# Patient Record
Sex: FEMALE | Race: WHITE | Employment: OTHER | URBAN - METROPOLITAN AREA
[De-identification: names, ages, dates, MRNs, and addresses within clinical notes are randomized per-mention and may not be internally consistent; named-entity substitution may affect disease eponyms.]

---

## 2017-01-12 ENCOUNTER — OFFICE VISIT (OUTPATIENT)
Dept: INTERNAL MEDICINE CLINIC | Age: 62
End: 2017-01-12

## 2017-01-12 VITALS
OXYGEN SATURATION: 95 % | HEIGHT: 62 IN | WEIGHT: 233 LBS | HEART RATE: 74 BPM | DIASTOLIC BLOOD PRESSURE: 72 MMHG | BODY MASS INDEX: 42.88 KG/M2 | SYSTOLIC BLOOD PRESSURE: 118 MMHG | TEMPERATURE: 98.2 F

## 2017-01-12 DIAGNOSIS — R19.8 FULLNESS OF ABDOMEN: Primary | ICD-10-CM

## 2017-01-12 DIAGNOSIS — R14.0 FLATULENCE/GAS PAIN/BELCHING: ICD-10-CM

## 2017-01-12 NOTE — PROGRESS NOTES
HPI/History  Lorrie Robertson is a 62 y.o.  female who presents for evaluation. Pt c/o an uncomfortable sensation of epigastric fullness/gas with excess gas/belching. Symptoms started during a 20 day round of abx with course of steroids for other issues. She finished the abx about 10 days ago but the sensation and belching have persisted but gradually improving. Sensation present at low level fairly consistently but worsened after meals. She feels much relief after belching. She does not describe it as pain and denies any other abdominal pain, fevers, abnormal frequency or characteristics of bowel movements. No changes in diet. Reflux has been well controlled with nexium but has tried taking bid with no known benefit to above. Also used mylanta with questionable benefit. Has not tried gasX, bean-o, or similar. Uses cpap but does not sound to be the cause (air gulping/similar). Rarely takes naproxen. Hx of impaired glucose but A1c 5.5 in November. Denies any cardiopulmonary sxs including palpitations/HR abnormalities. No other sxs or complaints.     Patient Active Problem List   Diagnosis Code    Obesity E66.9    Dyslipidemia E78.5    Bronchiectasis Dr Tilden Carrel J47.9    Sleep apnea, obstructive Dr. Juanjose Rockwell G47.33    Asthma with COPD ([address] J44.9, J45.909    Essential hypertension I10    Depression F32.9    Gastroesophageal reflux disease without esophagitis K21.9    IGT (impaired glucose tolerance) R73.02     Past Medical History   Diagnosis Date    Allergic rhinitis      Dr. Joyce Mckeon on shots    Asthma      FEV1/FVC 64%, preFEV1 51%, postFEV1 57%, %, DLCO 96% (2/15 - Dr Tilden Carrel)    [facility] Bronchiectasis [facility])      since childhood, idiopathic; CF negx2, A1AT neg, IgE nl, aspergillus nAb neg, sputum AFB neg, PPD neg    Chronic obstructive pulmonary disease ([facility]     Colon adenoma 3/12     Dr. Waldemar Sanchez Depression      PHQ-9 was 11/27, DANNY-7 was 5/21 from 4/15; did well on lexapro    Dyslipidemia      calculated 10 year risk score was 2.5% (4/15), 3.9% (10/15); 5% (11/16)    GERD (gastroesophageal reflux disease)     Hypertension     IGT (impaired glucose tolerance)     Insomnia      no response to trazodone or ambien    Morbid obesity with BMI of 40.0-44.9, adult ([facility])      peak weight 231 lbs, bmi 42.2 from 7/13; qsymia trial 4/14; declined med supervised wt loss, bariatrics w Dr Rc Sullivan    Pseudomonas respiratory infection      2015 Dr Jessica Spangler    Sleep apnea, obstructive 10/12     Dr Brook Crowell; AHI 15, RDI 15, lowest sat 84%, CPAP 15CWP; now seeing Dr Maribel Neville     Past Surgical History   Procedure Laterality Date    Bronchoscopy  1987    Egd  05/94    Hx heent       Sinus Surgery X2    Hx tonsillectomy      Hx knee arthroscopy Left 6/14     meniscus surgery Dr Naila Jose Pr cardiac surg procedure unlist  2/15     echo nl lv, ef 55%, nl wma, mild mr [facility])    Pr cardiac surg procedure unlist  2/15     holter negative    Hx colonoscopy       Dr. Rony Mccain 3/12 adenoma    Pr thoracoscopy w/lobectomy single lobe  07/06     s/p JOSE Lobectomy Dr. Hermes Murray 2006     Social History     Social History    Marital status:      Spouse name: N/A    Number of children: N/A    Years of education: N/A     Occupational History     W [employer]     Social History Main Topics    Smoking status: Never Smoker    Smokeless tobacco: Never Used    Alcohol use No    Drug use: No    Sexual activity: Not on file     Other Topics Concern    Not on file     Social History Narrative     Family History   Problem Relation Age of Onset    Hypertension Mother     Elevated Lipids Mother     Cancer Father     Colon Polyps Father     Arthritis-osteo Father     Crohn's Disease Sister     Migraines Brother      Current Outpatient Prescriptions   Medication Sig    roflumilast (DALIRESP) tab tablet Take 500 mcg by mouth daily.     diazePAM (VALIUM) 10 mg tablet Take 1 Tab by mouth every six (6) hours as needed for Anxiety.  amLODIPine (NORVASC) 10 mg tablet TAKE 1 TABLET BY MOUTH EVERY DAY    fluticasone (FLONASE) 50 mcg/actuation nasal spray 2 Sprays by Both Nostrils route daily.  esomeprazole (NEXIUM) 40 mg capsule TAKE 1 CAPSULE BY MOUTH EVERY DAY    budesonide-formoterol (SYMBICORT) 160-4.5 mcg/actuation HFA inhaler Take 2 Puffs by inhalation two (2) times a day.  albuterol (PROVENTIL VENTOLIN) 2.5 mg /3 mL (0.083 %) nebulizer solution 2.5 mg.    albuterol (PROVENTIL HFA, VENTOLIN HFA, PROAIR HFA) 90 mcg/actuation inhaler 2 Puffs.  triamterene-hydrochlorothiazide (DYAZIDE) 37.5-25 mg per capsule TAKE 1 CAPSULE BY MOUTH EVERY DAY AS NEEDED FOR SWELLING    tiotropium (SPIRIVA WITH HANDIHALER) 18 mcg inhalation capsule Take 1 Cap by inhalation daily.  naproxen sodium (NAPROSYN) 220 mg tablet     albuterol sulfate 2.5 mg/0.5 mL Nebu 2.5 mg, ipratropium 0.02 % Soln 0.5 mg 1 Dose by Nebulization route.  cpap machine kit by Does Not Apply route. CPAP at setting of 15 CWP with ramp of 20 minutes with humidifier. Mask: Mirage Quattro FX small size. Length of need 99 months. Replace mask and accessories as needed times 12 months. Download data at 45 days and fax at [phone]. No current facility-administered medications for this visit. Allergies   Allergen Reactions    Accolate [Zafirlukast] Other (comments)     Headache?  Daliresp [Roflumilast] Other (comments)     insomnia    Lisinopril Cough    Pollen Extracts Cough     Other reaction(s): wheezing/sob    Soma [Carisoprodol] Other (comments)     headache       Review of Systems  Rest of ROS negative. Significant findings included in HPI. Physical Examination  Visit Vitals    /72    Pulse 74    Temp 98.2 °F (36.8 °C) (Oral)    Ht 5' 2\" (1.575 m)    Wt 233 lb (105.7 kg)    SpO2 95%    BMI 42.62 kg/m2       General - Alert and in no acute distress.  Pt appears well, comfortable, and in good spirits. Nontoxic. Not anxious, non-diaphoretic. Mental status - Appropriate mood, behavior, speech content, dress, and thought processes. Pulm - Complete sentences. No tachypnea, retractions, or cyanosis. Good respiratory effort. Clear to auscultation bilat. No wheezes, rales, or rhonchi noted today. Cardiovascular - Normal rate, regular rhythm. No murmurs or gallops noted. Abdomen - Obese. Normoactive bowel sounds. Soft, nontender. No guarding, rigidity, or rebound. Negative Camp and McBurney. No appreciable masses. No other findings. Assessment and Plan  1. Epigastric fullness/gaseous sensation with excess belching - Started during a round of abx and appears to be gradually improving. Relatively benign exam with encouraging vitals and no apparent ominous developments. She will avoid exacerbating factors such as carbonated drinks, gum, or similar and incorporate small bland meals. She can continue nexium and will try gasX or bean-o or similar. Will continue to monitor for now with further planning/eval pending her progression. She will return/call if not continuing to improve, worsening, or any developments as discussed. Pt happily agrees with plan. PLEASE NOTE:   This document has been produced using voice recognition software. Unrecognized errors in transcription may be present.     Virginia Moore [facility] [address] [place] [address]  [phone]  1/12/2017

## 2017-01-12 NOTE — MR AVS SNAPSHOT
Visit Information Date & Time Provider Department Dept. Phone Encounter #  
 1/12/2017  9:00 AM Jake Dawnma Internist of [address] [id number] Upcoming Health Maintenance Date Due DTaP/Tdap/Td series (1 - Tdap) 10/23/1977 PAP AKA CERVICAL CYTOLOGY 10/23/2015 ZOSTER VACCINE AGE 60> 10/23/2016 COLONOSCOPY 2/24/2017 BREAST CANCER SCRN MAMMOGRAM 12/14/2018 Allergies as of 1/12/2017  Review Complete On: 1/12/2017 By: Lillie Cabral LPN Severity Noted Reaction Type Reactions Accolate [Zafirlukast]    Other (comments) Headache? Daliresp [Roflumilast]  10/23/2015    Other (comments)  
 insomnia Lisinopril    Cough Pollen Extracts  02/02/2015    Cough Other reaction(s): wheezing/sob Soma [Carisoprodol]  06/25/2012    Other (comments)  
 headache Current Immunizations  Reviewed on 10/30/2014 Name Date Influenza Vaccine 10/1/2016, 10/1/2015, 10/1/2014, 10/1/2013, 10/1/2012 Pneumococcal Polysaccharide (PPSV-23) 10/30/2014 Pneumococcal Vaccine (Unspecified Type) 12/15/2006 Not reviewed this visit Vitals BP Pulse Temp Height(growth percentile) Weight(growth percentile) SpO2  
 118/72 74 98.2 °F (36.8 °C) (Oral) 5' 2\" (1.575 m) 233 lb (105.7 kg) 95% BMI OB Status Smoking Status 42.62 kg/m2 Postmenopausal Never Smoker Vitals History BMI and BSA Data Body Mass Index Body Surface Area  
 42.62 kg/m 2 2.15 m 2 Preferred Pharmacy Pharmacy Name Phone [address] [phone] Your Updated Medication List  
  
   
This list is accurate as of: 1/12/17  9:05 AM.  Always use your most recent med list.  
  
  
  
  
 * albuterol 2.5 mg /3 mL (0.083 %) nebulizer solution Commonly known as:  PROVENTIL VENTOLIN  
2.5 mg.  
  
 * albuterol 90 mcg/actuation inhaler Commonly known as:  PROVENTIL HFA, VENTOLIN HFA, PROAIR HFA  
2 Puffs. albuterol sulfate 2.5 mg/0.5 mL Nebu 2.5 mg, ipratropium 0.02 % Soln 0.5 mg  
1 Dose by Nebulization route. amLODIPine 10 mg tablet Commonly known as:  Lorena Sukhwinder TAKE 1 TABLET BY MOUTH EVERY DAY  
  
 budesonide-formoterol 160-4.5 mcg/actuation HFA inhaler Commonly known as:  SYMBICORT Take 2 Puffs by inhalation two (2) times a day. chlorpheniramine-HYDROcodone 10-8 mg/5 mL suspension Commonly known as:  Trey Quezada Take 5 mL by mouth every twelve (12) hours as needed for Cough. Max Daily Amount: 10 mL.  
  
 cpap machine kit  
by Does Not Apply route. CPAP at setting of 15 CWP with ramp of 20 minutes with humidifier. Mask: Mirage Quattro FX small size. Length of need 99 months. Replace mask and accessories as needed times 12 months. Download data at 45 days and fax at [phone]. DALIRESP Tab tablet Generic drug:  roflumilast  
Take 500 mcg by mouth daily. diazePAM 10 mg tablet Commonly known as:  VALIUM Take 1 Tab by mouth every six (6) hours as needed for Anxiety. esomeprazole 40 mg capsule Commonly known as:  NexIUM  
TAKE 1 CAPSULE BY MOUTH EVERY DAY  
  
 fluticasone 50 mcg/actuation nasal spray Commonly known as:  Michaelyn Drought 2 Sprays by Both Nostrils route daily. naproxen sodium 220 mg tablet Commonly known as:  NAPROSYN  
  
 SPIRIVA WITH HANDIHALER 18 mcg inhalation capsule Generic drug:  tiotropium Take 1 Cap by inhalation daily. triamterene-hydroCHLOROthiazide 37.5-25 mg per capsule Commonly known as:  [place] Florencio TAKE 1 CAPSULE BY MOUTH EVERY DAY AS NEEDED FOR SWELLING  
  
 * Notice: This list has 2 medication(s) that are the same as other medications prescribed for you. Read the directions carefully, and ask your doctor or other care provider to review them with you. Introducing [facility]! Dear Dave Mayers: Thank you for requesting a [employer] account. Our records indicate that you already have an active [employer] account. You can access your account anytime at [email or web address] Did you know that you can access your hospital and ER discharge instructions at any time in [employer]? You can also review all of your test results from your hospital stay or ER visit. Additional Information If you have questions, please visit the Frequently Asked Questions section of the [employer] website at [email or web address] Remember, [employer] is NOT to be used for urgent needs. For medical emergencies, dial 911. Now available from your iPhone and Android! Please provide this summary of care documentation to your next provider. Your primary care clinician is listed as Jean Gómez. If you have any questions after today's visit, please call [phone].

## 2017-03-25 NOTE — PROGRESS NOTES
62 y.o. WHITE OR  female who presents for f/u    No cardiac complaints and her bp has been better when she checks    She continues to see Dr Valerie Brady and remains on regimen below, she had a lung biopsy when she had the sinus surgery but results are pending and she has f/u in hear future    Denies polyuria, polydipsia, nocturia, vision change. Not checking sugars at this time Not interested in tarik supp and medically supervised wt loss, gastric sleeve through Dr Aníbal Brizuela not covered.   Admits to overeating and stress eating in particular with her mom's dementia and her own illnesses, she loves chocolate    Vitals 3/29/2017 1/12/2017 11/29/2016 6/29/2016 2/12/2016   Weight 226 lb 233 lb 234 lb 232 lb 230 lb     She is interested in trying the restoril again as the trazodone and valium are not helping her sleep the whole night    No gi or gu complaints    Past Medical History:   Diagnosis Date    Allergic rhinitis     Dr. Jose Hameed on shots    Asthma     FEV1/FVC 64%, preFEV1 51%, postFEV1 57%, %, DLCO 96% (2/15 - Dr Valerie Brady)    [facility] Bronchiectasis [facility])     since childhood, idiopathic; CF negx2, A1AT neg, IgE nl, aspergillus nAb neg, sputum AFB neg, PPD neg    Chronic obstructive pulmonary disease ([facility]     Colon adenoma 3/12    Dr. Chinyere Larsen Depression     PHQ-9 was 11/27, DANNY-7 was 5/21 from 4/15; did well on lexapro    Dyslipidemia     calculated 10 year risk score was 2.5% (4/15), 3.9% (10/15); 5% (11/16)    GERD (gastroesophageal reflux disease)     Hypertension     IGT (impaired glucose tolerance)     Insomnia     no response to trazodone or ambien    Morbid obesity with BMI of 40.0-44.9, adult ([facility])     peak weight 231 lbs, bmi 42.2 from 7/13; qsymia trial 4/14; declined med supervised wt loss, bariatrics w Dr Aníbal Brizuela    Pseudomonas respiratory infection     2015 Dr Esther Berumen    Sleep apnea, obstructive 10/12    Dr Rickey Butt; AHI 15, RDI 15, lowest sat 84%, CPAP 15CWP; now seeing Dr Dmitry Rouse Past Surgical History:   Procedure Laterality Date    BRONCHOSCOPY  26    CARDIAC SURG PROCEDURE UNLIST  2/15    echo nl lv, ef 55%, nl wma, mild mr [facility])    CARDIAC SURG PROCEDURE UNLIST  2/15    holter negative    CHEST SURGERY PROCEDURE UNLISTED  03/2017    lung biopsy [facility]    EGD  05/94    HX COLONOSCOPY      Dr. Diaz Rothman 3/12 adenoma    HX HEENT      Sinus Surgery X2; Dr Clara Blake 3/17    HX KNEE ARTHROSCOPY Left 6/14    meniscus surgery Dr Yolanda Randall  07/06    s/p JOSE Lobectomy Dr. Taylor Nevarez 2006     Social History     Social History    Marital status:      Spouse name: N/A    Number of children: N/A    Years of education: N/A     Occupational History    [employer]     Social History Main Topics    Smoking status: Never Smoker    Smokeless tobacco: Never Used    Alcohol use No    Drug use: No    Sexual activity: Not on file     Other Topics Concern    Not on file     Social History Narrative     Current Outpatient Prescriptions   Medication Sig    temazepam (RESTORIL) 30 mg capsule Take 1 Cap by mouth nightly as needed for Sleep. Max Daily Amount: 30 mg.    roflumilast (DALIRESP) tab tablet Take 500 mcg by mouth daily.  diazePAM (VALIUM) 10 mg tablet Take 1 Tab by mouth every six (6) hours as needed for Anxiety.  amLODIPine (NORVASC) 10 mg tablet TAKE 1 TABLET BY MOUTH EVERY DAY    fluticasone (FLONASE) 50 mcg/actuation nasal spray 2 Sprays by Both Nostrils route daily.  esomeprazole (NEXIUM) 40 mg capsule TAKE 1 CAPSULE BY MOUTH EVERY DAY    budesonide-formoterol (SYMBICORT) 160-4.5 mcg/actuation HFA inhaler Take 2 Puffs by inhalation two (2) times a day.  albuterol (PROVENTIL VENTOLIN) 2.5 mg /3 mL (0.083 %) nebulizer solution 2.5 mg.    albuterol (PROVENTIL HFA, VENTOLIN HFA, PROAIR HFA) 90 mcg/actuation inhaler 2 Puffs.     triamterene-hydrochlorothiazide (DYAZIDE) 37.5-25 mg per capsule TAKE 1 CAPSULE BY MOUTH EVERY DAY AS NEEDED FOR SWELLING    tiotropium (SPIRIVA WITH HANDIHALER) 18 mcg inhalation capsule Take 1 Cap by inhalation daily.  naproxen sodium (NAPROSYN) 220 mg tablet     albuterol sulfate 2.5 mg/0.5 mL Nebu 2.5 mg, ipratropium 0.02 % Soln 0.5 mg 1 Dose by Nebulization route.  cpap machine kit by Does Not Apply route. CPAP at setting of 15 CWP with ramp of 20 minutes with humidifier. Mask: Mirage Quattro FX small size. Length of need 99 months. Replace mask and accessories as needed times 12 months. Download data at 45 days and fax at [phone]. No current facility-administered medications for this visit. Allergies   Allergen Reactions    Accolate [Zafirlukast] Other (comments)     Headache?  Daliresp [Roflumilast] Other (comments)     insomnia    Lisinopril Cough    Pollen Extracts Cough     Other reaction(s): wheezing/sob    Soma [Carisoprodol] Other (comments)     headache     REVIEW OF SYSTEMS: gyn Ches 2013, mammo 2016, colo 3/12 Dr Quintero [place]  Ophtho - no vision change or eye pain  Oral - no mouth pain, tongue or tooth problems  Ears - no hearing loss, ear pain, fullness, no swallowing problems  Cardiac - no CP, PND, orthopnea, palpitations or syncope  Chest - no breast masses  GI - no heartburn, nausea, vomiting, change in bowel habits, bleeding  Urinary - no dysuria, hematuria, flank pain, urgency, frequency  Derm - no nail abnormalities, rashes, lesions of note, hair loss  Psych - denies any anxiety or depression symptoms, no hallucinations or violent ideation  Constitutional - no wt loss, night sweats, unexplained fevers    Visit Vitals    /72    Pulse 76    Temp 98.5 °F (36.9 °C) (Oral)    Ht 5' 2\" (1.575 m)    Wt 226 lb (102.5 kg)    SpO2 93%    BMI 41.34 kg/m2     A&O x3  Affect is appropriate. Mood stable  No apparent distress  Anicteric, no JVD, adenopathy or thyromegaly.    No carotid bruits or radiated murmur  Lungs clear to auscultation although diminished breast sounds, no wheezes or rales  Heart showed regular rate and rhythm. No murmur, rubs, gallops  Abdomen soft nontender, no hepatosplenomegaly or masses. Extremities with 1+ edema edema. Pulses 1-2+ symmetrically    LABS  From 6/10 showed   gluc 83, cr 0.80, gfr>60,  alt 33,        chol 206, tg 157, hdl 49, ldl-c 126, wbc 8.2, hb 14.9, plt 275, tsh 0.77, vit d 31, b12 690  From 7/13 showed   gluc 99, cr 0.60, gfr>60,  alt 30,         chol 207, tg 159, hdl 51, ldl-c 124, wbc 7.4, hb 14.3, plt 239, ua neg,   From 3/14 showed                  hba1c 5.7, chol 202, tg 154, hdl 50, ldl-c 121, wbc 9.4, hb 14.1, plt 246  From 3/15 showed   gluc 93, cr 0.55, gfr>60,  alt<5,         wbc 8.6, hb 15.1, plt 277, tsh 1.00  From 4/15 showed   gluc 97, cr 0.77, gfr>60,   hba1c 5.7, chol 205, tg 162, hdl 48, ldl-c 125, wbc 7.1, hb 14.2, plt 244  From 10/15 showed gluc 97, cr 0.70, gfr>60,   hba1c 5.7, chol 226, tg 223, hdl 46, ldl-c 135  From 11/16 showed gluc 97, cr 0.54, gfr 103, alt 16, hba1c 5.5, chol 190, tg 130, hdl 49, ldl-c 115, wbc 6.4, hb 14.7, plt 246, hep c neg    Results for orders placed or performed during the hospital encounter of 12/20/16   IMMUNOGLOBULINS, G/A/M, QT.    Result Value Ref Range    IMMUNOGLOBULIN G 1106 694 - 1618 mg/dL    IgA TOTAL 186 81 - 463 mg/dL    IMMUNOGLOBULIN M 157 48 - 271 mg/dL   IGG SUBCLASSES   Result Value Ref Range    IgG Subclass 1 574 382 - 929 mg/dL    IgG Subclass 2 503 241 - 700 mg/dL    IgG Subclass 3 62 22 - 178 mg/dL    IgG Subclass 4 38.9 4.0 - 86.0 mg/dL     Calculated 10 year risk score was 5.0%    Patient Active Problem List   Diagnosis Code    Dyslipidemia E78.5    Bronchiectasis Dr Valerie Brady J47.9    Sleep apnea, obstructive Dr. Dmitry Rouse G47.33    Asthma with COPD ([facility] J44.9, J45.909    Primary hypertension I10    Depression F32.9    Gastroesophageal reflux disease without esophagitis K21.9    IGT (impaired glucose tolerance) R73.02    Chronic insomnia F51.04    Morbid obesity with BMI of 40.0-44.9, adult ([facility]) E66.01, Z68.41     Assessment and plan:  1. GERD. PPI and avoidance measures  2. HTN. Continue current  3. Obesity. Declined tarik suppressants, med supervised wt loss, bariatrics  4. Dyslipidemia. Lifestyle and dietary measures  5. Sinus. F/U Dr Mike Garcia  6. JESUS. F/U Dr Savannah Lebron  7. Asthma and COPD, bronchiectasis. Continue current, f/u Dr Andi Sargent  8. Depression. Off meds  9. She will get zosta outpt, tdap today  10.,  Insomnia. Trial restoril        RTC 11/17    Above conditions discussed at length and patient vocalized understanding.   All questions answered to patient satifaction

## 2017-03-29 ENCOUNTER — OFFICE VISIT (OUTPATIENT)
Dept: INTERNAL MEDICINE CLINIC | Age: 62
End: 2017-03-29

## 2017-03-29 VITALS
DIASTOLIC BLOOD PRESSURE: 72 MMHG | HEART RATE: 76 BPM | BODY MASS INDEX: 41.59 KG/M2 | WEIGHT: 226 LBS | OXYGEN SATURATION: 93 % | HEIGHT: 62 IN | TEMPERATURE: 98.5 F | SYSTOLIC BLOOD PRESSURE: 116 MMHG

## 2017-03-29 DIAGNOSIS — F51.04 CHRONIC INSOMNIA: Primary | ICD-10-CM

## 2017-03-29 DIAGNOSIS — J44.9 ASTHMA WITH COPD (HCC): ICD-10-CM

## 2017-03-29 DIAGNOSIS — J47.9 BRONCHIECTASIS WITHOUT COMPLICATION (HCC): ICD-10-CM

## 2017-03-29 DIAGNOSIS — R73.02 IGT (IMPAIRED GLUCOSE TOLERANCE): ICD-10-CM

## 2017-03-29 DIAGNOSIS — I10 ESSENTIAL HYPERTENSION: ICD-10-CM

## 2017-03-29 DIAGNOSIS — Z23 ENCOUNTER FOR IMMUNIZATION: ICD-10-CM

## 2017-03-29 DIAGNOSIS — G47.33 SLEEP APNEA, OBSTRUCTIVE: ICD-10-CM

## 2017-03-29 DIAGNOSIS — E78.5 DYSLIPIDEMIA: ICD-10-CM

## 2017-03-29 DIAGNOSIS — E66.01 MORBID OBESITY DUE TO EXCESS CALORIES (HCC): ICD-10-CM

## 2017-03-29 RX ORDER — TEMAZEPAM 30 MG/1
30 CAPSULE ORAL
Qty: 30 CAP | Refills: 3 | OUTPATIENT
Start: 2017-03-29 | End: 2017-10-09 | Stop reason: SDUPTHER

## 2017-03-29 NOTE — PROGRESS NOTES
1. Have you been to the ER, urgent care clinic or hospitalized since your last visit? YES. [facility] March 3, 2017 for Endoscopic sinus surgery. 2. Have you seen or consulted any other health care providers outside of the [address] [place] [address] since your last visit (Include any pap smears or colon screening)? YES  , Dr. Evelio Yoder for follow up of sinus surgery. Do you have an Advanced Directive? YES    Would you like information on Advanced Directives? NO    Pt states she will get her Zoster from [facility].

## 2017-03-29 NOTE — MR AVS SNAPSHOT
Visit Information Date & Time Provider Department Dept. Phone Encounter #  
 3/29/2017  8:15 AM Bud Victoria MD Internist of [address] [place] [address] Road [phone] [id number] Upcoming Health Maintenance Date Due DTaP/Tdap/Td series (1 - Tdap) 10/23/1977 PAP AKA CERVICAL CYTOLOGY 10/23/2015 ZOSTER VACCINE AGE 60> 10/23/2016 COLONOSCOPY 2/24/2017 BREAST CANCER SCRN MAMMOGRAM 12/14/2018 Allergies as of 3/29/2017  Review Complete On: 3/29/2017 By: Denis Ascencio Severity Noted Reaction Type Reactions Accolate [Zafirlukast]    Other (comments) Headache? Daliresp [Roflumilast]  10/23/2015    Other (comments)  
 insomnia Lisinopril    Cough Pollen Extracts  02/02/2015    Cough Other reaction(s): wheezing/sob Soma [Carisoprodol]  06/25/2012    Other (comments)  
 headache Current Immunizations  Reviewed on 10/30/2014 Name Date Influenza Vaccine 10/1/2016, 10/1/2015, 10/1/2014, 10/1/2013, 10/1/2012 Pneumococcal Polysaccharide (PPSV-23) 10/30/2014 Pneumococcal Vaccine (Unspecified Type) 12/15/2006 Tdap 3/29/2017 Not reviewed this visit You Were Diagnosed With   
  
 Codes Comments Encounter for immunization    -  Primary ICD-10-CM: V15 ICD-9-CM: V03.89 Vitals BP Pulse Temp Height(growth percentile) Weight(growth percentile) SpO2  
 116/72 76 98.5 °F (36.9 °C) (Oral) 5' 2\" (1.575 m) 226 lb (102.5 kg) 93% BMI OB Status Smoking Status 41.34 kg/m2 Postmenopausal Never Smoker Vitals History BMI and BSA Data Body Mass Index Body Surface Area  
 41.34 kg/m 2 2.12 m 2 Preferred Pharmacy Pharmacy Name Phone [address] [place] [phone] Your Updated Medication List  
  
   
This list is accurate as of: 3/29/17  8:57 AM.  Always use your most recent med list.  
  
  
  
  
 * albuterol 2.5 mg /3 mL (0.083 %) nebulizer solution Commonly known as:  PROVENTIL VENTOLIN  
2.5 mg.  
  
 * albuterol 90 mcg/actuation inhaler Commonly known as:  PROVENTIL HFA, VENTOLIN HFA, PROAIR HFA  
2 Puffs. albuterol sulfate 2.5 mg/0.5 mL Nebu 2.5 mg, ipratropium 0.02 % Soln 0.5 mg  
1 Dose by Nebulization route. amLODIPine 10 mg tablet Commonly known as:  Luz Elena Darting TAKE 1 TABLET BY MOUTH EVERY DAY  
  
 budesonide-formoterol 160-4.5 mcg/actuation HFA inhaler Commonly known as:  SYMBICORT Take 2 Puffs by inhalation two (2) times a day. cpap machine kit  
by Does Not Apply route. CPAP at setting of 15 CWP with ramp of 20 minutes with humidifier. Mask: Mirage Quattro FX small size. Length of need 99 months. Replace mask and accessories as needed times 12 months. Download data at 45 days and fax at [phone]. DALIRESP Tab tablet Generic drug:  roflumilast  
Take 500 mcg by mouth daily. diazePAM 10 mg tablet Commonly known as:  VALIUM Take 1 Tab by mouth every six (6) hours as needed for Anxiety. esomeprazole 40 mg capsule Commonly known as:  NexIUM  
TAKE 1 CAPSULE BY MOUTH EVERY DAY  
  
 fluticasone 50 mcg/actuation nasal spray Commonly known as:  Lennice Boss 2 Sprays by Both Nostrils route daily. naproxen sodium 220 mg tablet Commonly known as:  NAPROSYN  
  
 SPIRIVA WITH HANDIHALER 18 mcg inhalation capsule Generic drug:  tiotropium Take 1 Cap by inhalation daily. triamterene-hydroCHLOROthiazide 37.5-25 mg per capsule Commonly known as:  Marcina Majors TAKE 1 CAPSULE BY MOUTH EVERY DAY AS NEEDED FOR SWELLING  
  
 * Notice: This list has 2 medication(s) that are the same as other medications prescribed for you. Read the directions carefully, and ask your doctor or other care provider to review them with you. We Performed the Following  TETANUS, DIPHTHERIA TOXOIDS AND ACELLULAR PERTUSSIS VACCINE (TDAP), IN INDIVIDS. >=7,  [id number] CPT(R)] Introducing [facility]! Dear Karen Neri: 
Thank you for requesting a Diurnal account. Our records indicate that you already have an active Diurnal account. You can access your account anytime at [email or web address] Did you know that you can access your hospital and ER discharge instructions at any time in Diurnal? You can also review all of your test results from your hospital stay or ER visit. Additional Information If you have questions, please visit the Frequently Asked Questions section of the Diurnal website at [email or web address] Remember, Diurnal is NOT to be used for urgent needs. For medical emergencies, dial 911. Now available from your iPhone and Android! Please provide this summary of care documentation to your next provider. Your primary care clinician is listed as Bill Hook. If you have any questions after today's visit, please call [phone].

## 2017-03-31 ENCOUNTER — TELEPHONE (OUTPATIENT)
Dept: INTERNAL MEDICINE CLINIC | Age: 62
End: 2017-03-31

## 2017-03-31 NOTE — TELEPHONE ENCOUNTER
temazepam  Was to be called in at pt last visit. Never got done. Pt says that pharmacy now closed for weekend. Needs it called into [facility] at [facility].

## 2017-06-21 RX ORDER — ESOMEPRAZOLE MAGNESIUM 40 MG/1
CAPSULE, DELAYED RELEASE ORAL
Qty: 90 CAP | Refills: 3 | Status: SHIPPED | OUTPATIENT
Start: 2017-06-21 | End: 2018-08-02 | Stop reason: SDUPTHER

## 2017-07-10 ENCOUNTER — TELEPHONE (OUTPATIENT)
Dept: INTERNAL MEDICINE CLINIC | Age: 62
End: 2017-07-10

## 2017-07-10 NOTE — TELEPHONE ENCOUNTER
Patient is calling to see if you can take her son as a new patient. He would not need to be seen until after [place].

## 2017-07-27 ENCOUNTER — TELEPHONE (OUTPATIENT)
Dept: INTERNAL MEDICINE CLINIC | Age: 62
End: 2017-07-27

## 2017-07-27 NOTE — TELEPHONE ENCOUNTER
Pt would like to know if she could be referred to the masseuse that Dr. Adeola Burgos see for her sciatica pain. Pt stated that dr. PABLO told her about his personal massuese he uses. Please advise.

## 2017-07-28 NOTE — TELEPHONE ENCOUNTER
She does not take insurance so no referral needed  $60/session or $150 for 3 sessions  Suha Vaughn [phone]

## 2017-07-31 ENCOUNTER — TELEPHONE (OUTPATIENT)
Dept: INTERNAL MEDICINE CLINIC | Age: 62
End: 2017-07-31

## 2017-07-31 ENCOUNTER — OFFICE VISIT (OUTPATIENT)
Dept: INTERNAL MEDICINE CLINIC | Age: 62
End: 2017-07-31

## 2017-07-31 VITALS
RESPIRATION RATE: 16 BRPM | HEART RATE: 81 BPM | WEIGHT: 219.2 LBS | SYSTOLIC BLOOD PRESSURE: 118 MMHG | TEMPERATURE: 98.2 F | DIASTOLIC BLOOD PRESSURE: 76 MMHG | BODY MASS INDEX: 40.34 KG/M2 | OXYGEN SATURATION: 98 % | HEIGHT: 62 IN

## 2017-07-31 DIAGNOSIS — M25.552 PAIN OF LEFT HIP JOINT: Primary | ICD-10-CM

## 2017-07-31 DIAGNOSIS — Z12.11 SCREENING FOR COLON CANCER: ICD-10-CM

## 2017-07-31 DIAGNOSIS — M54.32 SCIATICA, LEFT SIDE: ICD-10-CM

## 2017-07-31 DIAGNOSIS — G57.12 MERALGIA PARESTHETICA OF LEFT SIDE: ICD-10-CM

## 2017-07-31 DIAGNOSIS — I10 PRIMARY HYPERTENSION: Primary | ICD-10-CM

## 2017-07-31 DIAGNOSIS — M79.18 LEFT BUTTOCK PAIN: ICD-10-CM

## 2017-07-31 DIAGNOSIS — M25.552 LEFT HIP PAIN: ICD-10-CM

## 2017-07-31 DIAGNOSIS — M54.42 BILATERAL LOW BACK PAIN WITH LEFT-SIDED SCIATICA, UNSPECIFIED CHRONICITY: ICD-10-CM

## 2017-07-31 RX ORDER — HYDROCODONE BITARTRATE AND ACETAMINOPHEN 5; 325 MG/1; MG/1
1 TABLET ORAL
Qty: 30 TAB | Refills: 0 | Status: SHIPPED | OUTPATIENT
Start: 2017-07-31 | End: 2018-04-17

## 2017-07-31 RX ORDER — AMLODIPINE BESYLATE 10 MG/1
TABLET ORAL
Qty: 90 TAB | Refills: 3 | Status: SHIPPED | OUTPATIENT
Start: 2017-07-31 | End: 2018-04-23 | Stop reason: SDUPTHER

## 2017-07-31 RX ORDER — CYCLOBENZAPRINE HCL 10 MG
10 TABLET ORAL
Qty: 30 TAB | Refills: 1 | Status: SHIPPED | OUTPATIENT
Start: 2017-07-31 | End: 2017-10-09 | Stop reason: SDUPTHER

## 2017-07-31 RX ORDER — METHYLPREDNISOLONE 4 MG/1
TABLET ORAL
Qty: 1 DOSE PACK | Refills: 0 | Status: SHIPPED | OUTPATIENT
Start: 2017-07-31 | End: 2017-10-09 | Stop reason: ALTCHOICE

## 2017-07-31 NOTE — PROGRESS NOTES
62 y.o. WHITE OR  female who presents for evaluation. She's been having left sided lower back pain about 2 mos. She thinks maybe she did something to her back while at work about 6 mos ago but is not sure. She thought she has sciatica so started doing the stretching exercise. Has not been using any nsaid, she has not had any steroid packs in over a year from the pulm standpoint. No associated urinary or gyn or gi complaints. Once in a while, she has a pain in the left groin region w certain movements.  The pain does radiate into her lower buttock/upper post thigh  As a separate issue, she's been having a numb sensation in the left lateral thigh area, more prominent when she is standing although this is not all the time    Past Medical History:   Diagnosis Date    Allergic rhinitis     Dr. Cristian Dillon on shots    Asthma     FEV1/FVC 64%, preFEV1 51%, postFEV1 57%, %, DLCO 96% (2/15 - Dr Isaiah Davenport)    Jarvis Samuels Bronchiectasis [facility])     since childhood, idiopathic; CF negx2, A1AT neg, IgE nl, aspergillus nAb neg, sputum AFB neg, PPD neg    Chronic obstructive pulmonary disease ([facility] [address]     Colon adenoma 3/12    Dr. Cammie Bond Depression     PHQ-9 was 11/27, DANNY-7 was 5/21 from 4/15; did well on lexapro    Dyslipidemia     calculated 10 year risk score was 2.5% (4/15), 3.9% (10/15); 5% (11/16)    GERD (gastroesophageal reflux disease)     Hypertension     IGT (impaired glucose tolerance)     Insomnia     no response to trazodone or ambien    Meralgia paresthetica of left side 7/31/2017    Morbid obesity with BMI of 40.0-44.9, adult ([facility])     peak weight 231 lbs, bmi 42.2 from 7/13; qsymia trial 4/14; declined med supervised wt loss, bariatrics w Dr James Pink    Pseudomonas respiratory infection     2015 Dr Elizabeth Mcduffie    Sleep apnea, obstructive 10/12    Dr Janine Cruz; AHI 15, RDI 15, lowest sat 84%, CPAP 15CWP; now seeing Dr Davie Helms     Past Surgical History:   Procedure Laterality Date    BRONCHOSCOPY [address] UNLIST  2/15    echo nl lv, ef 55%, nl wma, mild mr [facility])    CARDIAC SURG PROCEDURE UNLIST  2/15    holter negative    CHEST SURGERY PROCEDURE UNLISTED  03/2017    lung biopsy Nirmal Heard 92    EGD  05/94    HX COLONOSCOPY      Dr. Belen Bedoya 3/12 adenoma    HX HEENT      Sinus Surgery X2; Dr Katie Doss 3/17    HX KNEE ARTHROSCOPY Left 6/14    meniscus surgery Dr Alem Singh  07/06    s/p JOSE Lobectomy Dr. Francesca Allan 2006     Social History     Social History    Marital status:      Spouse name: N/A    Number of children: N/A    Years of education: N/A     Occupational History    [employer]     Social History Main Topics    Smoking status: Never Smoker    Smokeless tobacco: Never Used    Alcohol use No    Drug use: No    Sexual activity: Not on file     Other Topics Concern    Not on file     Social History Narrative     Current Outpatient Prescriptions   Medication Sig    amLODIPine (NORVASC) 10 mg tablet TAKE 1 TABLET BY MOUTH EVERY DAY    methylPREDNISolone (MEDROL DOSEPACK) 4 mg tablet Take as directed    HYDROcodone-acetaminophen (NORCO) 5-325 mg per tablet Take 1 Tab by mouth every six (6) hours as needed for Pain. Max Daily Amount: 4 Tabs.  cyclobenzaprine (FLEXERIL) 10 mg tablet Take 1 Tab by mouth three (3) times daily as needed for Muscle Spasm(s).  esomeprazole (NEXIUM) 40 mg capsule TAKE 1 CAPSULE BY MOUTH EVERY DAY    roflumilast (DALIRESP) tab tablet Take 500 mcg by mouth daily.  diazePAM (VALIUM) 10 mg tablet Take 1 Tab by mouth every six (6) hours as needed for Anxiety.  fluticasone (FLONASE) 50 mcg/actuation nasal spray 2 Sprays by Both Nostrils route daily.  budesonide-formoterol (SYMBICORT) 160-4.5 mcg/actuation HFA inhaler Take 2 Puffs by inhalation two (2) times a day.     albuterol (PROVENTIL VENTOLIN) 2.5 mg /3 mL (0.083 %) nebulizer solution 2.5 mg.    albuterol (PROVENTIL HFA, VENTOLIN HFA, PROAIR HFA) 90 mcg/actuation inhaler 2 Puffs.  triamterene-hydrochlorothiazide (DYAZIDE) 37.5-25 mg per capsule TAKE 1 CAPSULE BY MOUTH EVERY DAY AS NEEDED FOR SWELLING    tiotropium (SPIRIVA WITH HANDIHALER) 18 mcg inhalation capsule Take 1 Cap by inhalation daily.  naproxen sodium (NAPROSYN) 220 mg tablet     cpap machine kit by Does Not Apply route. CPAP at setting of 15 CWP with ramp of 20 minutes with humidifier. Mask: Mirage Quattro FX small size. Length of need 99 months. Replace mask and accessories as needed times 12 months. Download data at 45 days and fax at [phone].  temazepam (RESTORIL) 30 mg capsule Take 1 Cap by mouth nightly as needed for Sleep. Max Daily Amount: 30 mg. No current facility-administered medications for this visit. Allergies   Allergen Reactions    Accolate [Zafirlukast] Other (comments)     Headache?  Daliresp [Roflumilast] Other (comments)     insomnia    Lisinopril Cough    Pollen Extracts Cough     Other reaction(s): wheezing/sob    Soma [Carisoprodol] Other (comments)     headache     REVIEW OF SYSTEMS:   Ophtho - no vision change or eye pain  Oral - no mouth pain, tongue or tooth problems  Ears - no hearing loss, ear pain, fullness, no swallowing problems  Cardiac - no CP, PND, orthopnea, edema, palpitations or syncope  Chest - no breast masses  Resp - no wheezing, chronic coughing, dyspnea  GI - no heartburn, nausea, vomiting, change in bowel habits, bleeding, hemorrhoids  Urinary - no dysuria, hematuria, flank pain, urgency, frequency  Genitals - no genital lesions, discharge, masses, ulceration, warts    Visit Vitals    /76 (BP 1 Location: Left arm, BP Patient Position: Sitting)    Pulse 81    Temp 98.2 °F (36.8 °C) (Oral)    Resp 16    Ht 5' 2\" (1.575 m)    Wt 219 lb 3.2 oz (99.4 kg)    SpO2 98%    BMI 40.09 kg/m2   back showed no cvat.  Mild spasm noted in the left paraspinal areas in lumbar region, neg straight leg bilat, minimal tenderness on palp of the left trochanter. Strength normal    Assessment and plan:  1. R/O sciatica. R/O left hip disease. L spine and left hip films. Empiric medrol dose pack, flexeril and norco, sfx discussed at length. 2. Probable meralgia. Quick discussion, she will try to get some weight off  3. HTN. Refilled meds  4. Colon adenoma. Overdue, she will sched      Above conditions discussed at length and patient vocalized understanding.   All questions answered to patient satisfaction

## 2017-07-31 NOTE — MR AVS SNAPSHOT
Visit Information Date & Time Provider Department Dept. Phone Encounter #  
 7/31/2017  8:00 AM Angella Helms MD Internist of [address] [place] [address] [phone] [id number] Your Appointments 10/2/2017  8:00 AM  
Office Visit with Angella Helms MD  
Internist of [address] [place] [address]) Appt Note: 6 month ov with labs [address] [facility], [place] [address] [place]  
  
   
 [address] [facility] Upcoming Health Maintenance Date Due  
 PAP AKA CERVICAL CYTOLOGY 10/23/2015 ZOSTER VACCINE AGE 60> 8/23/2016 COLONOSCOPY 2/24/2017 INFLUENZA AGE 9 TO ADULT 8/1/2017 BREAST CANCER SCRN MAMMOGRAM 12/14/2018 DTaP/Tdap/Td series (2 - Td) 3/29/2027 Allergies as of 7/31/2017  Review Complete On: 7/31/2017 By: August Walker Severity Noted Reaction Type Reactions Accolate [Zafirlukast]    Other (comments) Headache? Daliresp [Roflumilast]  10/23/2015    Other (comments)  
 insomnia Lisinopril    Cough Pollen Extracts  02/02/2015    Cough Other reaction(s): wheezing/sob Soma [Carisoprodol]  06/25/2012    Other (comments)  
 headache Current Immunizations  Reviewed on 10/30/2014 Name Date Influenza Vaccine 10/1/2016, 10/1/2015, 10/1/2014, 10/1/2013, 10/1/2012 Pneumococcal Polysaccharide (PPSV-23) 10/30/2014 Tdap 3/29/2017 ZZZ-RETIRED (DO NOT USE) Pneumococcal Vaccine (Unspecified Type) 12/15/2006 Not reviewed this visit You Were Diagnosed With   
  
 Codes Comments Primary hypertension    -  Primary ICD-10-CM: I10 
ICD-9-CM: 401.9 Left hip pain     ICD-10-CM: M25.552 ICD-9-CM: 719.45 Left buttock pain     ICD-10-CM: M79.1 ICD-9-CM: 729.1 Meralgia paresthetica of left side     ICD-10-CM: G57.12 
ICD-9-CM: 355.1 Bilateral low back pain with left-sided sciatica, unspecified chronicity     ICD-10-CM: M54.42 
ICD-9-CM: 724.3 Vitals BP Pulse Temp Resp Height(growth percentile) Weight(growth percentile) 118/76 (BP 1 Location: Left arm, BP Patient Position: Sitting) 81 98.2 °F (36.8 °C) (Oral) 16 5' 2\" (1.575 m) 219 lb 3.2 oz (99.4 kg) SpO2 BMI OB Status Smoking Status 98% 40.09 kg/m2 Postmenopausal Never Smoker Vitals History BMI and BSA Data Body Mass Index Body Surface Area 40.09 kg/m 2 2.09 m 2 Preferred Pharmacy Pharmacy Name Phone [address] [phone] Your Updated Medication List  
  
   
This list is accurate as of: 7/31/17  8:42 AM.  Always use your most recent med list.  
  
  
  
  
 * albuterol 2.5 mg /3 mL (0.083 %) nebulizer solution Commonly known as:  PROVENTIL VENTOLIN  
2.5 mg.  
  
 * albuterol 90 mcg/actuation inhaler Commonly known as:  PROVENTIL HFA, VENTOLIN HFA, PROAIR HFA  
2 Puffs. amLODIPine 10 mg tablet Commonly known as:  Josette Chimes TAKE 1 TABLET BY MOUTH EVERY DAY  
  
 budesonide-formoterol 160-4.5 mcg/actuation HFA inhaler Commonly known as:  SYMBICORT Take 2 Puffs by inhalation two (2) times a day. cpap machine kit  
by Does Not Apply route. CPAP at setting of 15 CWP with ramp of 20 minutes with humidifier. Mask: Mirage Quattro FX small size. Length of need 99 months. Replace mask and accessories as needed times 12 months. Download data at 45 days and fax at [phone]. cyclobenzaprine 10 mg tablet Commonly known as:  FLEXERIL Take 1 Tab by mouth three (3) times daily as needed for Muscle Spasm(s). DALIRESP Tab tablet Generic drug:  roflumilast  
Take 500 mcg by mouth daily. diazePAM 10 mg tablet Commonly known as:  VALIUM Take 1 Tab by mouth every six (6) hours as needed for Anxiety. esomeprazole 40 mg capsule Commonly known as:  NexIUM  
TAKE 1 CAPSULE BY MOUTH EVERY DAY  
  
 fluticasone 50 mcg/actuation nasal spray Commonly known as:  Emi Samuels 2 Sprays by Both Nostrils route daily. HYDROcodone-acetaminophen 5-325 mg per tablet Commonly known as:  Anna Jimenez Take 1 Tab by mouth every six (6) hours as needed for Pain. Max Daily Amount: 4 Tabs. methylPREDNISolone 4 mg tablet Commonly known as:  Hensley Pacer Take as directed  
  
 naproxen sodium 220 mg tablet Commonly known as:  NAPROSYN  
  
 SPIRIVA WITH HANDIHALER 18 mcg inhalation capsule Generic drug:  tiotropium Take 1 Cap by inhalation daily. temazepam 30 mg capsule Commonly known as:  RESTORIL Take 1 Cap by mouth nightly as needed for Sleep. Max Daily Amount: 30 mg.  
  
 triamterene-hydroCHLOROthiazide 37.5-25 mg per capsule Commonly known as:  Mable Hamming TAKE 1 CAPSULE BY MOUTH EVERY DAY AS NEEDED FOR SWELLING  
  
 * Notice: This list has 2 medication(s) that are the same as other medications prescribed for you. Read the directions carefully, and ask your doctor or other care provider to review them with you. Prescriptions Printed Refills  
 methylPREDNISolone (MEDROL DOSEPACK) 4 mg tablet 0 Sig: Take as directed Class: Print HYDROcodone-acetaminophen (NORCO) 5-325 mg per tablet 0 Sig: Take 1 Tab by mouth every six (6) hours as needed for Pain. Max Daily Amount: 4 Tabs. Class: Print Route: Oral  
 cyclobenzaprine (FLEXERIL) 10 mg tablet 1 Sig: Take 1 Tab by mouth three (3) times daily as needed for Muscle Spasm(s). Class: Print Route: Oral  
  
Prescriptions Sent to Pharmacy Refills  
 amLODIPine (NORVASC) 10 mg tablet 3 Sig: TAKE 1 TABLET BY MOUTH EVERY DAY Class: Normal  
 Pharmacy: [address] [place] [address] [address] [place] [address] Ph #: [phone] [facility]! Dear Rabia Nguyen: 
Thank you for requesting a MysteryD account. Our records indicate that you already have an active MysteryD account.   You can access your account anytime at [email or web address] Ltd Did you know that you can access your hospital and ER discharge instructions at any time in JetPay? You can also review all of your test results from your hospital stay or ER visit. Additional Information If you have questions, please visit the Frequently Asked Questions section of the JetPay website at [email or web address] Remember, JetPay is NOT to be used for urgent needs. For medical emergencies, dial 911. Now available from your iPhone and Android! Please provide this summary of care documentation to your next provider. Your primary care clinician is listed as Mark Iglesias. If you have any questions after today's visit, please call [phone].

## 2017-07-31 NOTE — PROGRESS NOTES
1. Have you been to the ER, urgent care clinic or hospitalized since your last visit? NO.     2. Have you seen or consulted any other health care providers outside of the [address] [place] [address] since your last visit (Include any pap smears or colon screening)? NO      Do you have an Advanced Directive? YES    Patient stated she will bring in a copy.

## 2017-07-31 NOTE — TELEPHONE ENCOUNTER
Patient is hoping to get her xray done today. Please put in order and I will call her and let her know once entered.

## 2017-08-02 NOTE — PROGRESS NOTES
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 She will call them today to get colonoscopy rescheduled and said thank you for the reminder to get this done.

## 2017-08-07 ENCOUNTER — HOSPITAL ENCOUNTER (OUTPATIENT)
Dept: GENERAL RADIOLOGY | Age: 62
Discharge: HOME | End: 2017-08-07
Payer: COMMERCIAL

## 2017-08-07 DIAGNOSIS — M54.32 SCIATICA, LEFT SIDE: ICD-10-CM

## 2017-08-07 DIAGNOSIS — M25.552 PAIN OF LEFT HIP JOINT: ICD-10-CM

## 2017-08-07 PROCEDURE — 73502 X-RAY EXAM HIP UNI 2-3 VIEWS: CPT

## 2017-08-07 PROCEDURE — 72120 X-RAY BEND ONLY L-S SPINE: CPT

## 2017-08-09 ENCOUNTER — TELEPHONE (OUTPATIENT)
Dept: INTERNAL MEDICINE CLINIC | Age: 62
End: 2017-08-09

## 2017-08-09 NOTE — TELEPHONE ENCOUNTER
pls call    Hip film of  Spine film below    Is she better? Questionable instability in her l spine    Impression:    1.  Atypical degree of movement at L4-5 and L5-S1 levels suggestive of  instability. 2.  Facet arthropathy. 3.  Questionable minimal spondylolisthesis at L3-4, identifiable on flexion  Only.     Suggest spine eval dr Marge Mckeon group if interested

## 2017-08-10 NOTE — TELEPHONE ENCOUNTER
Patient aware of message below. She verbalized understanding. She said she wants to try to do massage therapy first and see how she does, but right now she is feeling a little better.  She will call if it worsens

## 2017-09-26 DIAGNOSIS — E78.5 DYSLIPIDEMIA: ICD-10-CM

## 2017-09-28 DIAGNOSIS — R73.02 IGT (IMPAIRED GLUCOSE TOLERANCE): ICD-10-CM

## 2017-09-28 DIAGNOSIS — E78.5 DYSLIPIDEMIA: ICD-10-CM

## 2017-10-05 NOTE — PROGRESS NOTES
62 y.o. WHITE OR  female who presents for f/u    No cardiovascular complaints. Not exercising much but she tries to remain active    She continues to see Dr Joesph Olivo and remains on regimen below. No respiratory sx currently although she was treated for sinus infection recently by ENT. She is asking for some tussionex for the postnasal drip and resulting cough    Denies polyuria, polydipsia, nocturia, vision change. Not checking sugars at this time. She has lost some weight over the last few months by cutting her portions. Not interested in tarik supp and medically supervised wt loss, gastric sleeve through Dr Sharon Marley not covered. Vitals 10/9/2017 7/31/2017 3/29/2017 1/12/2017 11/29/2016   Weight 211 lb 3.2 oz 219 lb 3.2 oz 226 lb 233 lb 234 lb     No gi or gu complaints.   She is scheduled for colo Dr Donte Kate 10/13/17    The back issues have been helped meds and the back brace    She will be undergoing cataract surgery by Dr Aneudy Becker in the near future    Past Medical History:   Diagnosis Date    Allergic rhinitis     Dr. Liliane Kirkpatrick on immunotherapy    Asthma     FEV1/FVC 64%, preFEV1 51%, postFEV1 57%, %, DLCO 96% (2/15 - Dr Joesph Olivo)    [facility] Bronchiectasis [facility])     since childhood, idiopathic; CF negx2, A1AT neg, IgE nl, aspergillus nAb neg, sputum AFB neg, PPD neg    Chronic obstructive pulmonary disease ([address]     Colon adenoma 3/12    Dr. Michelle Jeffrey Depression     PHQ-9 was 11/27, DANNY-7 was 5/21 from 4/15; did well on lexapro    Dyslipidemia     calculated 10 year risk score was 2.5% (4/15), 3.9% (10/15); 5% (11/16)    GERD (gastroesophageal reflux disease)     Hypertension     IGT (impaired glucose tolerance)     Insomnia     no response to trazodone or ambien    Meralgia paresthetica of left side 7/31/2017    Morbid obesity with BMI of 40.0-44.9, adult ([facility])     peak weight 231 lbs, bmi 42.2 from 7/13; qsymia trial 4/14; declined med supervised wt loss, bariatrics w Dr Mcknihgt Eye Pseudomonas respiratory infection     2015 Dr Deeann Saint    Sleep apnea, obstructive 10/12    Dr Claudean Spangle; AHI 15, RDI 15, lowest sat 84%, CPAP 15CWP; now seeing Dr Rehana Fagan     Past Surgical History:   Procedure Laterality Date   [address]  2/15    echo nl lv, ef 55%, nl wma, mild mr [facility])    CARDIAC SURG PROCEDURE UNLIST  2/15    holter negative    CHEST SURGERY PROCEDURE UNLISTED  03/2017    lung biopsy [facility]    EGD  05/94    HX COLONOSCOPY      Dr. Soheila Gottlieb 3/12 adenoma    HX HEENT      Sinus Surgery X2; Dr Zoraida Caballero 3/17    HX KNEE ARTHROSCOPY Left 6/14    meniscus surgery Dr Bonnie Kumar  07/06    s/p JOSE Lobectomy Dr. Frank Lee 2006     Social History     Social History    Marital status:      Spouse name: N/A    Number of children: N/A    Years of education: N/A     Occupational History     [address]     Social History Main Topics    Smoking status: Never Smoker    Smokeless tobacco: Never Used    Alcohol use No    Drug use: No    Sexual activity: Not on file     Other Topics Concern    Not on file     Social History Narrative     Current Outpatient Prescriptions   Medication Sig    cyclobenzaprine (FLEXERIL) 10 mg tablet Take 1 Tab by mouth three (3) times daily as needed for Muscle Spasm(s).  temazepam (RESTORIL) 30 mg capsule Take 1 Cap by mouth nightly as needed for Sleep. Max Daily Amount: 30 mg.    chlorpheniramine-HYDROcodone (TUSSIONEX) 10-8 mg/5 mL suspension Take 5 mL by mouth every twelve (12) hours as needed for Cough. Max Daily Amount: 10 mL.  amLODIPine (NORVASC) 10 mg tablet TAKE 1 TABLET BY MOUTH EVERY DAY    esomeprazole (NEXIUM) 40 mg capsule TAKE 1 CAPSULE BY MOUTH EVERY DAY    roflumilast (DALIRESP) tab tablet Take 500 mcg by mouth daily.  fluticasone (FLONASE) 50 mcg/actuation nasal spray 2 Sprays by Both Nostrils route daily.     budesonide-formoterol (SYMBICORT) 160-4.5 mcg/actuation HFA inhaler Take 2 Puffs by inhalation two (2) times a day.  albuterol (PROVENTIL VENTOLIN) 2.5 mg /3 mL (0.083 %) nebulizer solution 2.5 mg.    albuterol (PROVENTIL HFA, VENTOLIN HFA, PROAIR HFA) 90 mcg/actuation inhaler 2 Puffs.  triamterene-hydrochlorothiazide (DYAZIDE) 37.5-25 mg per capsule TAKE 1 CAPSULE BY MOUTH EVERY DAY AS NEEDED FOR SWELLING    tiotropium (SPIRIVA WITH HANDIHALER) 18 mcg inhalation capsule Take 1 Cap by inhalation daily.  naproxen sodium (NAPROSYN) 220 mg tablet     cpap machine kit by Does Not Apply route. CPAP at setting of 15 CWP with ramp of 20 minutes with humidifier. Mask: Mirage Quattro FX small size. Length of need 99 months. Replace mask and accessories as needed times 12 months. Download data at 45 days and fax at [phone].  HYDROcodone-acetaminophen (NORCO) 5-325 mg per tablet Take 1 Tab by mouth every six (6) hours as needed for Pain. Max Daily Amount: 4 Tabs.  diazePAM (VALIUM) 10 mg tablet Take 1 Tab by mouth every six (6) hours as needed for Anxiety. No current facility-administered medications for this visit. Allergies   Allergen Reactions    Accolate [Zafirlukast] Other (comments)     Headache?     Daliresp [Roflumilast] Other (comments)     insomnia    Lisinopril Cough    Pollen Extracts Cough     Other reaction(s): wheezing/sob    Soma [Carisoprodol] Other (comments)     headache     REVIEW OF SYSTEMS: gyn Ches 2013, mammo 2016, colo 3/12 Dr Khoury Lips  Ophtho  no vision change or eye pain  Oral  no mouth pain, tongue or tooth problems  Ears  no hearing loss, ear pain, fullness, no swallowing problems  Cardiac  no CP, PND, orthopnea, palpitations or syncope  Chest  no breast masses  GI  no heartburn, nausea, vomiting, change in bowel habits, bleeding  Urinary  no dysuria, hematuria, flank pain, urgency, frequency  Derm  no nail abnormalities, rashes, lesions of note, hair loss  Psych  denies any anxiety or depression symptoms, no hallucinations or violent ideation  Constitutional  no wt loss, night sweats, unexplained fevers    Visit Vitals    /70 (BP 1 Location: Left arm, BP Patient Position: Sitting)    Pulse 74    Temp 98.3 °F (36.8 °C) (Oral)    Resp 14    Ht 5' 2\" (1.575 m)    Wt 211 lb 3.2 oz (95.8 kg)    SpO2 95%    BMI 38.63 kg/m2     A&O x3  Affect is appropriate. Mood stable  No apparent distress  Anicteric, no JVD, adenopathy or thyromegaly. No carotid bruits or radiated murmur  Lungs clear to auscultation although diminished breast sounds, no wheezes or rales  Heart showed regular rate and rhythm. No murmur, rubs, gallops  Abdomen soft nontender, no hepatosplenomegaly or masses. Extremities with 1+ edema edema.   Pulses 1-2+ symmetrically    LABS  From 6/10 showed   gluc 83, cr 0.80, gfr>60,  alt 33,        chol 206, tg 157, hdl 49, ldl-c 126, wbc 8.2, hb 14.9, plt 275, tsh 0.77, vit d 31, b12 690  From 7/13 showed   gluc 99, cr 0.60, gfr>60,  alt 30,         chol 207, tg 159, hdl 51, ldl-c 124, wbc 7.4, hb 14.3, plt 239, ua neg,   From 3/14 showed                  hba1c 5.7, chol 202, tg 154, hdl 50, ldl-c 121, wbc 9.4, hb 14.1, plt 246  From 3/15 showed   gluc 93, cr 0.55, gfr>60,  alt<5,         wbc 8.6, hb 15.1, plt 277, tsh 1.00  From 4/15 showed   gluc 97, cr 0.77, gfr>60,   hba1c 5.7, chol 205, tg 162, hdl 48, ldl-c 125, wbc 7.1, hb 14.2, plt 244  From 10/15 showed gluc 97, cr 0.70, gfr>60,   hba1c 5.7, chol 226, tg 223, hdl 46, ldl-c 135  From 11/16 showed gluc 97, cr 0.54, gfr 103, alt 16, hba1c 5.5, chol 190, tg 130, hdl 49, ldl-c 115, wbc 6.4, hb 14.7, plt 246, hep c neg    Patient Active Problem List   Diagnosis Code    Dyslipidemia E78.5    Bronchiectasis Dr Silvia Felix J47.9    Sleep apnea, obstructive Dr. Yoselin Escobar G47.33    Asthma with COPD ([facility] [address] J44.9    Primary hypertension I10    Depression F32.9    Gastroesophageal reflux disease without esophagitis K21.9    IGT (impaired glucose tolerance) R73.02    Chronic insomnia F51.04    Meralgia paresthetica of left side G57.12    Obesity (BMI 30-39. 9) E66.9     Assessment and plan:  1. GERD. PPI and avoidance measures  2. HTN. Continue current  3. Obesity. Declined tarik suppressants, med supervised wt loss, bariatrics  4. Dyslipidemia. Lifestyle and dietary measures  5. Sinus. F/U Dr Mike Garcia  6. JESUS. F/U Dr Savannah Lebron  7. Asthma and COPD, bronchiectasis. Continue current, f/u Dr Andi Sargent  8. Depression. Off meds          RTC 4/18    Above conditions discussed at length and patient vocalized understanding.   All questions answered to patient satifaction

## 2017-10-09 ENCOUNTER — HOSPITAL ENCOUNTER (OUTPATIENT)
Dept: LAB | Age: 62
Discharge: HOME | End: 2017-10-09

## 2017-10-09 ENCOUNTER — OFFICE VISIT (OUTPATIENT)
Dept: INTERNAL MEDICINE CLINIC | Age: 62
End: 2017-10-09

## 2017-10-09 VITALS
RESPIRATION RATE: 14 BRPM | HEIGHT: 62 IN | BODY MASS INDEX: 38.87 KG/M2 | SYSTOLIC BLOOD PRESSURE: 114 MMHG | DIASTOLIC BLOOD PRESSURE: 70 MMHG | WEIGHT: 211.2 LBS | OXYGEN SATURATION: 95 % | TEMPERATURE: 98.3 F | HEART RATE: 74 BPM

## 2017-10-09 DIAGNOSIS — M25.552 LEFT HIP PAIN: ICD-10-CM

## 2017-10-09 DIAGNOSIS — M54.42 BILATERAL LOW BACK PAIN WITH LEFT-SIDED SCIATICA, UNSPECIFIED CHRONICITY: ICD-10-CM

## 2017-10-09 DIAGNOSIS — R05.9 COUGH: Primary | ICD-10-CM

## 2017-10-09 DIAGNOSIS — M79.18 LEFT BUTTOCK PAIN: ICD-10-CM

## 2017-10-09 DIAGNOSIS — F51.04 CHRONIC INSOMNIA: ICD-10-CM

## 2017-10-09 PROBLEM — E66.9 OBESITY (BMI 30-39.9): Status: ACTIVE | Noted: 2017-10-09

## 2017-10-09 PROCEDURE — 99001 SPECIMEN HANDLING PT-LAB: CPT | Performed by: INTERNAL MEDICINE

## 2017-10-09 RX ORDER — TEMAZEPAM 30 MG/1
30 CAPSULE ORAL
Qty: 30 CAP | Refills: 3 | OUTPATIENT
Start: 2017-10-09 | End: 2017-11-02 | Stop reason: SDUPTHER

## 2017-10-09 RX ORDER — HYDROCODONE POLISTIREX AND CHLORPHENIRAMINE POLISTIREX 10; 8 MG/5ML; MG/5ML
5 SUSPENSION, EXTENDED RELEASE ORAL
Qty: 120 ML | Refills: 0 | Status: SHIPPED | OUTPATIENT
Start: 2017-10-09 | End: 2018-04-23

## 2017-10-09 RX ORDER — CYCLOBENZAPRINE HCL 10 MG
10 TABLET ORAL
Qty: 60 TAB | Refills: 1 | Status: SHIPPED | OUTPATIENT
Start: 2017-10-09 | End: 2018-04-17

## 2017-10-09 NOTE — MR AVS SNAPSHOT
Visit Information Date & Time Provider Department Dept. Phone Encounter #  
 10/9/2017  8:15 AM Risa Hardy MD Internist of [address] [place] [address] Road [phone] [id number] Your Appointments 4/23/2018  8:20 AM  
PHYSICAL with Risa Hardy MD  
Internist of [facility]) Appt Note: 6 months labs at [facility] or [address] [facility], [address] Donnamarie Hedge [address] [place]  
  
   
 [address], [address] Upcoming Health Maintenance Date Due  
 PAP AKA CERVICAL CYTOLOGY 10/23/2015 ZOSTER VACCINE AGE 60> 8/23/2016 COLONOSCOPY 2/24/2017 INFLUENZA AGE 9 TO ADULT 8/1/2017 BREAST CANCER SCRN MAMMOGRAM 12/14/2018 DTaP/Tdap/Td series (2 - Td) 3/29/2027 Allergies as of 10/9/2017  Review Complete On: 10/9/2017 By: Cirilo Quezada Severity Noted Reaction Type Reactions Accolate [Zafirlukast]    Other (comments) Headache? Daliresp [Roflumilast]  10/23/2015    Other (comments)  
 insomnia Lisinopril    Cough Pollen Extracts  02/02/2015    Cough Other reaction(s): wheezing/sob Soma [Carisoprodol]  06/25/2012    Other (comments)  
 headache Current Immunizations  Reviewed on 10/30/2014 Name Date Influenza Vaccine 10/1/2016, 10/1/2015, 10/1/2014, 10/1/2013, 10/1/2012 Pneumococcal Polysaccharide (PPSV-23) 10/30/2014 Tdap 3/29/2017 ZZZ-RETIRED (DO NOT USE) Pneumococcal Vaccine (Unspecified Type) 12/15/2006 Not reviewed this visit You Were Diagnosed With   
  
 Codes Comments Cough    -  Primary ICD-10-CM: P08 ICD-9-CM: 282. 2 Left hip pain     ICD-10-CM: M25.552 ICD-9-CM: 719.45 Left buttock pain     ICD-10-CM: M79.1 ICD-9-CM: 729.1 Bilateral low back pain with left-sided sciatica, unspecified chronicity     ICD-10-CM: M54.42 
ICD-9-CM: 724. 3 Chronic insomnia     ICD-10-CM: F51.04 
ICD-9-CM: 780.52 Vitals BP Pulse Temp Resp Height(growth percentile) Weight(growth percentile) 114/70 (BP 1 Location: Left arm, BP Patient Position: Sitting) 74 98.3 °F (36.8 °C) (Oral) 14 5' 2\" (1.575 m) 211 lb 3.2 oz (95.8 kg) SpO2 BMI OB Status Smoking Status 95% 38.63 kg/m2 Postmenopausal Never Smoker Vitals History BMI and BSA Data Body Mass Index Body Surface Area  
 38.63 kg/m 2 2.05 m 2 Preferred Pharmacy Pharmacy Name Phone [address] [phone] Your Updated Medication List  
  
   
This list is accurate as of: 10/9/17  8:53 AM.  Always use your most recent med list.  
  
  
  
  
 * albuterol 2.5 mg /3 mL (0.083 %) nebulizer solution Commonly known as:  PROVENTIL VENTOLIN  
2.5 mg.  
  
 * albuterol 90 mcg/actuation inhaler Commonly known as:  PROVENTIL HFA, VENTOLIN HFA, PROAIR HFA  
2 Puffs. amLODIPine 10 mg tablet Commonly known as:  Viry Fraction TAKE 1 TABLET BY MOUTH EVERY DAY  
  
 budesonide-formoterol 160-4.5 mcg/actuation Hfaa Commonly known as:  SYMBICORT Take 2 Puffs by inhalation two (2) times a day. chlorpheniramine-HYDROcodone 10-8 mg/5 mL suspension Commonly known as:  Chidi Field Take 5 mL by mouth every twelve (12) hours as needed for Cough. Max Daily Amount: 10 mL.  
  
 cpap machine kit  
by Does Not Apply route. CPAP at setting of 15 CWP with ramp of 20 minutes with humidifier. Mask: Mirage Quattro FX small size. Length of need 99 months. Replace mask and accessories as needed times 12 months. Download data at 45 days and fax at [phone]. cyclobenzaprine 10 mg tablet Commonly known as:  FLEXERIL Take 1 Tab by mouth three (3) times daily as needed for Muscle Spasm(s). DALIRESP Tab tablet Generic drug:  roflumilast  
Take 500 mcg by mouth daily. diazePAM 10 mg tablet Commonly known as:  VALIUM  
 Take 1 Tab by mouth every six (6) hours as needed for Anxiety. esomeprazole 40 mg capsule Commonly known as:  NexIUM  
TAKE 1 CAPSULE BY MOUTH EVERY DAY  
  
 fluticasone 50 mcg/actuation nasal spray Commonly known as:  Lella Solum 2 Sprays by Both Nostrils route daily. HYDROcodone-acetaminophen 5-325 mg per tablet Commonly known as:  Robertjennifer Dixonlich Take 1 Tab by mouth every six (6) hours as needed for Pain. Max Daily Amount: 4 Tabs. naproxen sodium 220 mg tablet Commonly known as:  NAPROSYN  
  
 SPIRIVA WITH HANDIHALER 18 mcg inhalation capsule Generic drug:  tiotropium Take 1 Cap by inhalation daily. temazepam 30 mg capsule Commonly known as:  RESTORIL Take 1 Cap by mouth nightly as needed for Sleep. Max Daily Amount: 30 mg.  
  
 triamterene-hydroCHLOROthiazide 37.5-25 mg per capsule Commonly known as:  Tony Fengon TAKE 1 CAPSULE BY MOUTH EVERY DAY AS NEEDED FOR SWELLING  
  
 * Notice: This list has 2 medication(s) that are the same as other medications prescribed for you. Read the directions carefully, and ask your doctor or other care provider to review them with you. Prescriptions Printed Refills  
 chlorpheniramine-HYDROcodone (TUSSIONEX) 10-8 mg/5 mL suspension 0 Sig: Take 5 mL by mouth every twelve (12) hours as needed for Cough. Max Daily Amount: 10 mL. Class: Print Route: Oral  
  
Prescriptions Sent to Pharmacy Refills  
 cyclobenzaprine (FLEXERIL) 10 mg tablet 1 Sig: Take 1 Tab by mouth three (3) times daily as needed for Muscle Spasm(s). Class: Normal  
 Pharmacy: [address] [place] [address] [address] Ph #: [phone] Route: Oral  
  
Introducing [facility] & HEALTH SERVICES! Dear Josué Lindo: 
Thank you for requesting a [employer] account. Our records indicate that you already have an active [employer] account. You can access your account anytime at [email or web address] Did you know that you can access your hospital and ER discharge instructions at any time in [employer]? You can also review all of your test results from your hospital stay or ER visit. Additional Information If you have questions, please visit the Frequently Asked Questions section of the [employer] website at [email or web address] Remember, [employer] is NOT to be used for urgent needs. For medical emergencies, dial 911. Now available from your iPhone and Android! Please provide this summary of care documentation to your next provider. Your primary care clinician is listed as Bill Hook. If you have any questions after today's visit, please call [phone].

## 2017-10-09 NOTE — PROGRESS NOTES
1. Have you been to the ER, urgent care clinic or hospitalized since your last visit? YES. March 1, 2017 at [facility].  sinus surgery    2. Have you seen or consulted any other health care providers outside of the [address] [place] [address] since your last visit (Include any pap smears or colon screening)? YES   for Sinus    Do you have an Advanced Directive? YES    Would you like information on Advanced Directives?  NO

## 2017-10-10 ENCOUNTER — TELEPHONE (OUTPATIENT)
Dept: INTERNAL MEDICINE CLINIC | Age: 62
End: 2017-10-10

## 2017-10-10 NOTE — TELEPHONE ENCOUNTER
----- Message from Latrell Rodriguez MD sent at 10/9/2017  5:09 PM EDT -----  Records puritz/gyn pls

## 2017-11-02 DIAGNOSIS — F51.04 CHRONIC INSOMNIA: ICD-10-CM

## 2017-11-02 RX ORDER — TEMAZEPAM 30 MG/1
30 CAPSULE ORAL
Qty: 90 CAP | Refills: 1 | OUTPATIENT
Start: 2017-11-02 | End: 2018-04-17

## 2017-11-02 NOTE — TELEPHONE ENCOUNTER
PHONE IN RX    [address] [facility] reports the last fill date for Restoril as 09/19/2017 for a 30 day supply. There appears to be no inconsistencies in regards to the prescribing of this medication. The patient requests a 90 day supply of Restoril to be phoned in to [facility] in [facility]. Last Visit: 10/09/2017 with MD Tiffani Felix    Next Appointment: 04/23/2018 with MD Tiffani Felix   Previous Refill Encounters: 10/09/2017 Tiffani Felix #30 with 3 refills     Requested Prescriptions     Pending Prescriptions Disp Refills    temazepam (RESTORIL) 30 mg capsule 90 Cap 1     Sig: Take 1 Cap by mouth nightly as needed for Sleep. Max Daily Amount: 30 mg.

## 2017-12-04 ENCOUNTER — OFFICE VISIT (OUTPATIENT)
Dept: INTERNAL MEDICINE CLINIC | Age: 62
End: 2017-12-04

## 2017-12-04 VITALS
BODY MASS INDEX: 38.24 KG/M2 | OXYGEN SATURATION: 98 % | TEMPERATURE: 98.5 F | HEIGHT: 62 IN | WEIGHT: 207.8 LBS | RESPIRATION RATE: 14 BRPM | SYSTOLIC BLOOD PRESSURE: 110 MMHG | DIASTOLIC BLOOD PRESSURE: 68 MMHG | HEART RATE: 85 BPM

## 2017-12-04 DIAGNOSIS — I10 PRIMARY HYPERTENSION: ICD-10-CM

## 2017-12-04 DIAGNOSIS — Z01.818 PREOP EXAM FOR INTERNAL MEDICINE: Primary | ICD-10-CM

## 2017-12-04 DIAGNOSIS — R73.02 IGT (IMPAIRED GLUCOSE TOLERANCE): ICD-10-CM

## 2017-12-04 DIAGNOSIS — G47.33 SLEEP APNEA, OBSTRUCTIVE: ICD-10-CM

## 2017-12-04 DIAGNOSIS — F51.04 CHRONIC INSOMNIA: ICD-10-CM

## 2017-12-04 DIAGNOSIS — K21.9 GASTROESOPHAGEAL REFLUX DISEASE WITHOUT ESOPHAGITIS: ICD-10-CM

## 2017-12-04 DIAGNOSIS — J47.9 BRONCHIECTASIS WITHOUT COMPLICATION (HCC): ICD-10-CM

## 2017-12-04 DIAGNOSIS — E78.5 DYSLIPIDEMIA: ICD-10-CM

## 2017-12-04 DIAGNOSIS — J44.9 ASTHMA WITH COPD (HCC): ICD-10-CM

## 2017-12-04 RX ORDER — TRAZODONE HYDROCHLORIDE 100 MG/1
TABLET ORAL
Qty: 30 TAB | Refills: 5 | Status: SHIPPED | OUTPATIENT
Start: 2017-12-04 | End: 2018-04-17

## 2017-12-04 RX ORDER — CHOLECALCIFEROL (VITAMIN D3) 125 MCG
CAPSULE ORAL
COMMUNITY

## 2017-12-04 NOTE — PROGRESS NOTES
1. Have you been to the ER, urgent care clinic or hospitalized since your last visit? NO.     2. Have you seen or consulted any other health care providers outside of the [address] [place] [address] since your last visit (Include any pap smears or colon screening)? NO      Do you have an Advanced Directive? YES    Would you like information on Advanced Directives?  NO

## 2017-12-04 NOTE — MR AVS SNAPSHOT
Visit Information Date & Time Provider Department Dept. Phone Encounter #  
 12/4/2017  9:20 AM Chyna Knowles MD Internists of [address] [place] [address] [id number] Your Appointments 4/23/2018  8:20 AM  
PHYSICAL with Chyna Knowles MD  
Internists of [address] [place] [address] [address]) Appt Note: 6 months labs at [facility] or [facility]  
 [address] [facility], [address] [place]  
  
   
 [address], [address] Upcoming Health Maintenance Date Due  
 PAP AKA CERVICAL CYTOLOGY 10/23/2015 ZOSTER VACCINE AGE 60> 8/23/2016 BREAST CANCER SCRN MAMMOGRAM 12/14/2018 COLONOSCOPY 10/13/2022 DTaP/Tdap/Td series (2 - Td) 3/29/2027 Allergies as of 12/4/2017  Review Complete On: 12/4/2017 By: Thom Flores Severity Noted Reaction Type Reactions Accolate [Zafirlukast]    Other (comments) Headache? Daliresp [Roflumilast]  10/23/2015    Other (comments)  
 insomnia Lisinopril    Cough Pollen Extracts  02/02/2015    Cough Other reaction(s): wheezing/sob Soma [Carisoprodol]  06/25/2012    Other (comments)  
 headache Current Immunizations  Reviewed on 10/9/2017 Name Date Influenza Vaccine 10/1/2016, 10/1/2015, 10/1/2014, 10/1/2013, 10/1/2012 Pneumococcal Polysaccharide (PPSV-23) 10/30/2014 Tdap 3/29/2017 ZZZ-RETIRED (DO NOT USE) Pneumococcal Vaccine (Unspecified Type) 12/15/2006 Not reviewed this visit Vitals BP Pulse Temp Resp Height(growth percentile) Weight(growth percentile) 110/68 (BP 1 Location: Right arm, BP Patient Position: Sitting) 85 98.5 °F (36.9 °C) (Oral) 14 5' 2\" (1.575 m) 207 lb 12.8 oz (94.3 kg) SpO2 BMI OB Status Smoking Status 98% 38.01 kg/m2 Postmenopausal Never Smoker Vitals History BMI and BSA Data Body Mass Index Body Surface Area 38.01 kg/m 2 2.03 m 2 Preferred Pharmacy Pharmacy Name Phone [facility] [place]  [phone] Your Updated Medication List  
  
   
This list is accurate as of: 12/4/17  9:44 AM.  Always use your most recent med list.  
  
  
  
  
 * albuterol 2.5 mg /3 mL (0.083 %) nebulizer solution Commonly known as:  PROVENTIL VENTOLIN  
2.5 mg.  
  
 * albuterol 90 mcg/actuation inhaler Commonly known as:  PROVENTIL HFA, VENTOLIN HFA, PROAIR HFA  
2 Puffs. amLODIPine 10 mg tablet Commonly known as:  Anshu Presser TAKE 1 TABLET BY MOUTH EVERY DAY  
  
 budesonide-formoterol 160-4.5 mcg/actuation Hfaa Commonly known as:  SYMBICORT Take 2 Puffs by inhalation two (2) times a day. chlorpheniramine-HYDROcodone 10-8 mg/5 mL suspension Commonly known as:  Candie Ma Take 5 mL by mouth every twelve (12) hours as needed for Cough. Max Daily Amount: 10 mL.  
  
 cpap machine kit  
by Does Not Apply route. CPAP at setting of 15 CWP with ramp of 20 minutes with humidifier. Mask: Mirage Quattro FX small size. Length of need 99 months. Replace mask and accessories as needed times 12 months. Download data at 45 days and fax at [phone]. cyclobenzaprine 10 mg tablet Commonly known as:  FLEXERIL Take 1 Tab by mouth three (3) times daily as needed for Muscle Spasm(s). DALIRESP Tab tablet Generic drug:  roflumilast  
Take 500 mcg by mouth daily. diazePAM 10 mg tablet Commonly known as:  VALIUM Take 1 Tab by mouth every six (6) hours as needed for Anxiety. esomeprazole 40 mg capsule Commonly known as:  NexIUM  
TAKE 1 CAPSULE BY MOUTH EVERY DAY  
  
 fluticasone 50 mcg/actuation nasal spray Commonly known as:  Euna Robert 2 Sprays by Both Nostrils route daily. HYDROcodone-acetaminophen 5-325 mg per tablet Commonly known as:  Lynnetta Mcguire Take 1 Tab by mouth every six (6) hours as needed for Pain. Max Daily Amount: 4 Tabs. naproxen sodium 220 mg tablet Commonly known as:  NAPROSYN  
  
 SPIRIVA WITH HANDIHALER 18 mcg inhalation capsule Generic drug:  tiotropium Take 1 Cap by inhalation daily. temazepam 30 mg capsule Commonly known as:  RESTORIL Take 1 Cap by mouth nightly as needed for Sleep. Max Daily Amount: 30 mg.  
  
 triamterene-hydroCHLOROthiazide 37.5-25 mg per capsule Commonly known as:  Lilian Анна TAKE 1 CAPSULE BY MOUTH EVERY DAY AS NEEDED FOR SWELLING  
  
 VITAMIN D3 2,000 unit Tab Generic drug:  cholecalciferol (vitamin D3) Take  by mouth. * Notice: This list has 2 medication(s) that are the same as other medications prescribed for you. Read the directions carefully, and ask your doctor or other care provider to review them with you. Introducing [facility] & HEALTH SERVICES! Dear Norman Goltz: 
Thank you for requesting a [employer] account. Our records indicate that you already have an active [employer] account. You can access your account anytime at [email or web address] Did you know that you can access your hospital and ER discharge instructions at any time in [employer]? You can also review all of your test results from your hospital stay or ER visit. Additional Information If you have questions, please visit the Frequently Asked Questions section of the [employer] website at [email or web address] Remember, [employer] is NOT to be used for urgent needs. For medical emergencies, dial 911. Now available from your iPhone and Android! Please provide this summary of care documentation to your next provider. Your primary care clinician is listed as Rachna Ojeda. If you have any questions after today's visit, please call [phone].

## 2017-12-05 NOTE — PROGRESS NOTES
65 y.o. WHITE OR  female who presents for f/u    Here for med clearance prior to planned cataract surgery by Dr Rita Brooke    No cardiovascular complaints. She remains active at work although no set exercise program.  Her demented mom moved out to [facility] so at least not as stressed as previously when she was primary caretaker    She continues to see Dr Janet Francisco and remains on regimen below. Denies polyuria, polydipsia, nocturia, vision change. Not checking sugars at this time. Weight continues to go down with watching her diet    Vitals 12/4/2017 10/9/2017 7/31/2017 3/29/2017 1/12/2017   Weight 207 lb 12.8 oz 211 lb 3.2 oz 219 lb 3.2 oz 226 lb 233 lb     Vitals 11/29/2016   Weight 234 lb     No gi or gu complaints. She had colo by Dr Gina Gonzalez    She continues to have insomnia issues, getting only 3-4 hrs sleep even with restoril. She';s convinced it's her work scheduled.   She is interested in trying trazodone    Past Medical History:   Diagnosis Date    Allergic rhinitis     Dr. Kehinde Sky on immunotherapy    Asthma     FEV1/FVC 64%, preFEV1 51%, postFEV1 57%, %, DLCO 96% (2/15 - Dr Janet Francisco)    [address] [place] [address] Bronchiectasis [facility])     since childhood, idiopathic; CF negx2, A1AT neg, IgE nl, aspergillus nAb neg, sputum AFB neg, PPD neg    Chronic obstructive pulmonary disease ([facility] [address]     Colon adenoma 03/2012    Dr. Gina Gonzalez 3/12; 10/13/17 polyp    Depression 04/2015    PHQ-9 was 11/27, DANNY-7 was 5/21; did well on lexapro    Dyslipidemia     calculated 10 year risk score was 2.5% (4/15), 3.9% (10/15); 5% (11/16)    GERD (gastroesophageal reflux disease)     Hypertension     IGT (impaired glucose tolerance)     Insomnia     no response to trazodone or ambien    Meralgia paresthetica of left side 7/31/2017    Morbid obesity with BMI of 40.0-44.9, adult ([facility])     peak weight 231 lbs, bmi 42.2 from 7/13; qsymia trial 4/14; declined med supervised wt loss, bariatrics w Dr Lizzie Dee respiratory infection     2015 Dr Mark Flores    Sleep apnea, obstructive 10/12    Dr Clayton Gurrola; AHI 15, RDI 15, lowest sat 84%, CPAP 15CWP; now seeing Dr Sally Pereira     Past Surgical History:   Procedure Laterality Date   [address]  2/15    echo nl lv, ef 55%, nl wma, mild mr [facility])    CARDIAC SURG PROCEDURE UNLIST  2/15    holter negative    CHEST SURGERY PROCEDURE UNLISTED  03/2017    lung biopsy [facility]    EGD  05/94    HX COLONOSCOPY      Dr. Theo Bonds 3/12 adenoma; 10/13/17 polyp    HX HEENT      Sinus Surgery X2; Dr Blondie Koyanagi 3/17    HX KNEE ARTHROSCOPY Left 6/14    meniscus surgery Dr Matt Arroyo  07/06    s/p JOSE Lobectomy Dr. Rafa Friend 2006     Social History     Social History    Marital status:      Spouse name: N/A    Number of children: N/A    Years of education: N/A     Occupational History     [address]     Social History Main Topics    Smoking status: Never Smoker    Smokeless tobacco: Never Used    Alcohol use No    Drug use: No    Sexual activity: Not on file     Other Topics Concern    Not on file     Social History Narrative     Current Outpatient Prescriptions   Medication Sig    cholecalciferol, vitamin D3, (VITAMIN D3) 2,000 unit tab Take  by mouth.  traZODone (DESYREL) 100 mg tablet 1/2 to 1 tab qhs    temazepam (RESTORIL) 30 mg capsule Take 1 Cap by mouth nightly as needed for Sleep. Max Daily Amount: 30 mg.    cyclobenzaprine (FLEXERIL) 10 mg tablet Take 1 Tab by mouth three (3) times daily as needed for Muscle Spasm(s).  chlorpheniramine-HYDROcodone (TUSSIONEX) 10-8 mg/5 mL suspension Take 5 mL by mouth every twelve (12) hours as needed for Cough. Max Daily Amount: 10 mL.     amLODIPine (NORVASC) 10 mg tablet TAKE 1 TABLET BY MOUTH EVERY DAY    esomeprazole (NEXIUM) 40 mg capsule TAKE 1 CAPSULE BY MOUTH EVERY DAY    roflumilast (DALIRESP) tab tablet Take 500 mcg by mouth daily.  fluticasone (FLONASE) 50 mcg/actuation nasal spray 2 Sprays by Both Nostrils route daily.  budesonide-formoterol (SYMBICORT) 160-4.5 mcg/actuation HFA inhaler Take 2 Puffs by inhalation two (2) times a day.  albuterol (PROVENTIL VENTOLIN) 2.5 mg /3 mL (0.083 %) nebulizer solution 2.5 mg.    albuterol (PROVENTIL HFA, VENTOLIN HFA, PROAIR HFA) 90 mcg/actuation inhaler 2 Puffs.  tiotropium (SPIRIVA WITH HANDIHALER) 18 mcg inhalation capsule Take 1 Cap by inhalation daily.  cpap machine kit by Does Not Apply route. CPAP at setting of 15 CWP with ramp of 20 minutes with humidifier. Mask: Mirage Quattro FX small size. Length of need 99 months. Replace mask and accessories as needed times 12 months. Download data at 45 days and fax at [phone].  HYDROcodone-acetaminophen (NORCO) 5-325 mg per tablet Take 1 Tab by mouth every six (6) hours as needed for Pain. Max Daily Amount: 4 Tabs.  diazePAM (VALIUM) 10 mg tablet Take 1 Tab by mouth every six (6) hours as needed for Anxiety.  triamterene-hydrochlorothiazide (DYAZIDE) 37.5-25 mg per capsule TAKE 1 CAPSULE BY MOUTH EVERY DAY AS NEEDED FOR SWELLING    naproxen sodium (NAPROSYN) 220 mg tablet      No current facility-administered medications for this visit. Allergies   Allergen Reactions    Accolate [Zafirlukast] Other (comments)     Headache?     Daliresp [Roflumilast] Other (comments)     insomnia    Lisinopril Cough    Pollen Extracts Cough     Other reaction(s): wheezing/sob    Soma [Carisoprodol] Other (comments)     headache     REVIEW OF SYSTEMS: gyn Ches 2013, mammo 2016, colo 10/17 Dr Nilsa Mckinley  Ophtho  no vision change or eye pain  Oral  no mouth pain, tongue or tooth problems  Ears  no hearing loss, ear pain, fullness, no swallowing problems  Cardiac  no CP, PND, orthopnea, palpitations or syncope  Chest  no breast masses  GI  no heartburn, nausea, vomiting, change in bowel habits, bleeding  Urinary  no dysuria, hematuria, flank pain, urgency, frequency  Derm  no nail abnormalities, rashes, lesions of note, hair loss  Psych  denies any anxiety or depression symptoms, no hallucinations or violent ideation  Constitutional  no wt loss, night sweats, unexplained fevers    Visit Vitals    /68 (BP 1 Location: Right arm, BP Patient Position: Sitting)    Pulse 85    Temp 98.5 °F (36.9 °C) (Oral)    Resp 14    Ht 5' 2\" (1.575 m)    Wt 207 lb 12.8 oz (94.3 kg)    SpO2 98%    BMI 38.01 kg/m2     A&O x3  Affect is appropriate. Mood stable  No apparent distress  Anicteric, no JVD, adenopathy or thyromegaly. No carotid bruits or radiated murmur  Lungs clear to auscultation although diminished breast sounds, no wheezes or rales  Heart showed regular rate and rhythm. No murmur, rubs, gallops  Abdomen soft nontender, no hepatosplenomegaly or masses. Extremities with 1+ edema edema.   Pulses 1-2+ symmetrically    LABS  From 6/10 showed   gluc 83, cr 0.80, gfr>60,  alt 33,        chol 206, tg 157, hdl 49, ldl-c 126, wbc 8.2, hb 14.9, plt 275, tsh 0.77, vit d 31, b12 690  From 7/13 showed   gluc 99, cr 0.60, gfr>60,  alt 30,         chol 207, tg 159, hdl 51, ldl-c 124, wbc 7.4, hb 14.3, plt 239, ua neg,   From 3/14 showed                  hba1c 5.7, chol 202, tg 154, hdl 50, ldl-c 121, wbc 9.4, hb 14.1, plt 246  From 3/15 showed   gluc 93, cr 0.55, gfr>60,  alt<5,         wbc 8.6, hb 15.1, plt 277, tsh 1.00  From 4/15 showed   gluc 97, cr 0.77, gfr>60,   hba1c 5.7, chol 205, tg 162, hdl 48, ldl-c 125, wbc 7.1, hb 14.2, plt 244  From 10/15 showed gluc 97, cr 0.70, gfr>60,   hba1c 5.7, chol 226, tg 223, hdl 46, ldl-c 135  From 11/16 showed gluc 97, cr 0.54, gfr 103, alt 16, hba1c 5.5, chol 190, tg 130, hdl 49, ldl-c 115, wbc 6.4, hb 14.7, plt 246, hep c neg    Patient Active Problem List   Diagnosis Code    Dyslipidemia E78.5    Bronchiectasis Dr Maria Del Rosario Tenorio J47.9    Sleep apnea, obstructive Dr. Giorgi Escobar G47.33    Asthma with COPD ([facility] [address] J44.9    Primary hypertension I10    Gastroesophageal reflux disease without esophagitis K21.9    IGT (impaired glucose tolerance) R73.02    Chronic insomnia F51.04    Meralgia paresthetica of left side G57.12    Obesity (BMI 30-39. 9) E66.9     Assessment and plan:  1. GERD. Will try to get double dose ppi covered  2. HTN. Continue current  3. Obesity. Declined tarik suppressants, med supervised wt loss, bariatrics  4. Dyslipidemia. Lifestyle and dietary measures  5. Sinus. F/U Dr Lenora Mas  6. JESUS. F/U Dr Caleb Sage  7. Asthma and COPD, bronchiectasis. Continue current, f/u Dr Tilden Carrel  8. Depression. Off meds  9. Insomnia. Trial trazodone  10. Ophth. She is felt to be average risk for the planned procedure, no contraindications noted. RTC 4/18    Above conditions discussed at length and patient vocalized understanding.   All questions answered to patient satifaction

## 2018-04-23 ENCOUNTER — OFFICE VISIT (OUTPATIENT)
Dept: INTERNAL MEDICINE CLINIC | Age: 63
End: 2018-04-23

## 2018-04-23 VITALS
HEIGHT: 62 IN | OXYGEN SATURATION: 96 % | DIASTOLIC BLOOD PRESSURE: 66 MMHG | TEMPERATURE: 99.1 F | SYSTOLIC BLOOD PRESSURE: 120 MMHG | HEART RATE: 69 BPM | WEIGHT: 201 LBS | BODY MASS INDEX: 36.99 KG/M2

## 2018-04-23 DIAGNOSIS — G47.00 INSOMNIA, UNSPECIFIED TYPE: ICD-10-CM

## 2018-04-23 DIAGNOSIS — F51.04 CHRONIC INSOMNIA: ICD-10-CM

## 2018-04-23 DIAGNOSIS — E78.5 DYSLIPIDEMIA: ICD-10-CM

## 2018-04-23 DIAGNOSIS — J47.9 BRONCHIECTASIS WITHOUT COMPLICATION (HCC): ICD-10-CM

## 2018-04-23 DIAGNOSIS — I10 PRIMARY HYPERTENSION: ICD-10-CM

## 2018-04-23 DIAGNOSIS — G47.33 SLEEP APNEA, OBSTRUCTIVE: ICD-10-CM

## 2018-04-23 DIAGNOSIS — J44.9 ASTHMA WITH COPD (HCC): ICD-10-CM

## 2018-04-23 DIAGNOSIS — E66.01 SEVERE OBESITY (BMI 35.0-39.9) WITH COMORBIDITY (HCC): ICD-10-CM

## 2018-04-23 DIAGNOSIS — F41.9 ANXIETY: ICD-10-CM

## 2018-04-23 DIAGNOSIS — K21.9 GASTROESOPHAGEAL REFLUX DISEASE WITHOUT ESOPHAGITIS: ICD-10-CM

## 2018-04-23 DIAGNOSIS — R73.02 IGT (IMPAIRED GLUCOSE TOLERANCE): ICD-10-CM

## 2018-04-23 DIAGNOSIS — Z00.00 PHYSICAL EXAM: Primary | ICD-10-CM

## 2018-04-23 RX ORDER — AMLODIPINE BESYLATE 10 MG/1
TABLET ORAL
Qty: 90 TAB | Refills: 3 | Status: SHIPPED | OUTPATIENT
Start: 2018-04-23 | End: 2018-04-26 | Stop reason: SDUPTHER

## 2018-04-23 RX ORDER — DIAZEPAM 10 MG/1
10 TABLET ORAL
Qty: 60 TAB | Refills: 1 | OUTPATIENT
Start: 2018-04-23 | End: 2018-04-25 | Stop reason: SDUPTHER

## 2018-04-23 NOTE — PROGRESS NOTES
65 y.o. WHITE OR  female who presents for RPE    She did well with the cataract surgery    No cardiovascular complaints. She remains active at work although no set exercise program.     She continues to see Dr Freda Guadalupe and is doing well on the regimen below. Denies polyuria, polydipsia, nocturia, vision change. Not checking sugars at this time. Weight continues to go down as below, just doing a much better job with the overall intake and avoiding carbs    Vitals 4/23/2018 4/18/2018 4/18/2018 4/18/2018 4/17/2018   Weight 201 lb   200 lb 196 lb     Vitals 7/31/2017 3/29/2017 1/12/2017 11/29/2016 6/29/2016   Weight 219 lb 3.2 oz 226 lb 233 lb 234 lb 232 lb     No gi or gu complaints. She continues to have insomnia issues, getting only 3-4 hrs sleep due to her bad work schedule. Tried and failed restoril, trazodone. She's not able to get a different schedule, wants to try valium as that seemed to work better than anything.   Discussed belsomra type meds but declined for now    IF 4/18    Past Medical History:   Diagnosis Date    Allergic rhinitis     Dr. Mavis Muller on immunotherapy    Asthma     FEV1/FVC 64%, preFEV1 51%, postFEV1 57%, %, DLCO 96% (2/15 - Dr Freda Guadalupe)    Jamie Knowles Bronchiectasis [facility])     since childhood, idiopathic; CF negx2, A1AT neg, IgE nl, aspergillus nAb neg, sputum AFB neg, PPD neg    Chronic obstructive pulmonary disease ([facility] [address]     Colon adenoma 03/2012    Dr. Jie Denton 3/12; 10/13/17 polyp    Depression 04/2015    PHQ-9 was 11/27, DANNY-7 was 5/21; did well on lexapro    Dyslipidemia     calculated 10 year risk score was 2.5% (4/15), 3.9% (10/15); 5% (11/16)    GERD (gastroesophageal reflux disease)     Hypertension     IGT (impaired glucose tolerance)     Insomnia     no response to trazodone or ambien    Meralgia paresthetica of left side 7/31/2017    Morbid obesity with BMI of 40.0-44.9, adult ([facility])     peak weight 231 lbs, bmi 42.2 from 7/13; qsymia trial 4/14; declined med supervised wt loss, bariatrics w Dr Lorena Li; IF 4/18    Pseudomonas respiratory infection     2015 Dr Komal Vitale    Sleep apnea, obstructive 10/12    Dr Twyla Thomas; AHI 15, RDI 15, lowest sat 84%, CPAP 15CWP; now seeing Dr Elisabet Prado     Past Surgical History:   Procedure Laterality Date   [address]  2/15    echo nl lv, ef 55%, nl wma, mild mr [facility])    CARDIAC SURG PROCEDURE UNLIST  2/15    holter negative    CHEST SURGERY PROCEDURE UNLISTED  03/2017    lung biopsy [facility]    EGD  05/94    HX CATARACT REMOVAL  2017    Dr Bonni Becton Dr. Cloria Dakin 3/12 adenoma; 10/13/17 polyp    HX HEENT      Sinus Surgery X2; Dr Cordero Hand 3/17    HX KNEE ARTHROSCOPY Left 6/14    meniscus surgery Dr Neri Rutherford  07/06    s/p JOSE Lobectomy Dr. Susana Hernandez [facility]     Social History     Social History    Marital status:      Spouse name: N/A    Number of children: N/A    Years of education: N/A     Occupational History    [employer]     Social History Main Topics    Smoking status: Never Smoker    Smokeless tobacco: Never Used    Alcohol use No    Drug use: No    Sexual activity: Not on file     Other Topics Concern    Not on file     Social History Narrative     Current Outpatient Prescriptions   Medication Sig    amLODIPine (NORVASC) 10 mg tablet TAKE 1 TABLET BY MOUTH EVERY DAY    diazePAM (VALIUM) 10 mg tablet Take 1 Tab by mouth every twelve (12) hours as needed for Anxiety. Max Daily Amount: 20 mg.    cholecalciferol, vitamin D3, (VITAMIN D3) 2,000 unit tab Take  by mouth.  esomeprazole (NEXIUM) 40 mg capsule TAKE 1 CAPSULE BY MOUTH EVERY DAY    roflumilast (DALIRESP) tab tablet Take 500 mcg by mouth daily.  fluticasone (FLONASE) 50 mcg/actuation nasal spray 2 Sprays by Both Nostrils route daily.     budesonide-formoterol (SYMBICORT) 160-4.5 mcg/actuation HFA inhaler Take 2 Puffs by inhalation two (2) times a day.  albuterol (PROVENTIL VENTOLIN) 2.5 mg /3 mL (0.083 %) nebulizer solution 2.5 mg.    albuterol (PROVENTIL HFA, VENTOLIN HFA, PROAIR HFA) 90 mcg/actuation inhaler 2 Puffs.  tiotropium (SPIRIVA WITH HANDIHALER) 18 mcg inhalation capsule Take 1 Cap by inhalation daily.  cpap machine kit by Does Not Apply route. CPAP at setting of 15 CWP with ramp of 20 minutes with humidifier. Mask: Mirage Quattro FX small size. Length of need 99 months. Replace mask and accessories as needed times 12 months. Download data at 45 days and fax at [phone].  triamterene-hydrochlorothiazide (DYAZIDE) 37.5-25 mg per capsule TAKE 1 CAPSULE BY MOUTH EVERY DAY AS NEEDED FOR SWELLING     No current facility-administered medications for this visit. Allergies   Allergen Reactions    Accolate [Zafirlukast] Other (comments)     Headache?     Daliresp [Roflumilast] Other (comments)     insomnia    Lisinopril Cough    Pollen Extracts Cough     Other reaction(s): wheezing/sob    Soma [Carisoprodol] Other (comments)     headache     REVIEW OF SYSTEMS: gyn Ches 2017, mammo 2016, colo 10/17 Dr Mayela Huertas  Ophtho  no vision change or eye pain  Oral  no mouth pain, tongue or tooth problems  Ears  no hearing loss, ear pain, fullness, no swallowing problems  Cardiac  no CP, PND, orthopnea, palpitations or syncope  Chest  no breast masses  GI  no heartburn, nausea, vomiting, change in bowel habits, bleeding  Urinary  no dysuria, hematuria, flank pain, urgency, frequency  Derm  no nail abnormalities, rashes, lesions of note, hair loss  Psych  denies any anxiety or depression symptoms, no hallucinations or violent ideation  Constitutional  no wt loss, night sweats, unexplained fevers    Visit Vitals    /66 (BP 1 Location: Left arm, BP Patient Position: Sitting)    Pulse 69    Temp 99.1 °F (37.3 °C) (Oral)    Ht 5' 2\" (1.575 m)    Wt 201 lb (91.2 kg)    SpO2 96%    BMI 36.76 kg/m2 A&O x3  Affect is appropriate. Mood stable  No apparent distress  Anicteric, no JVD, adenopathy or thyromegaly. No carotid bruits or radiated murmur  Lungs clear to auscultation although diminished breast sounds, no wheezes or rales  Heart showed regular rate and rhythm. No murmur, rubs, gallops  Abdomen soft nontender, no hepatosplenomegaly or masses. Extremities with 1+ edema edema. Pulses 1-2+ symmetrically    LABS  From 6/10 showed   gluc 83, cr 0.80, gfr>60,  alt 33,        chol 206, tg 157, hdl 49, ldl-c 126, wbc 8.2, hb 14.9, plt 275, tsh 0.77, vit d 31, b12 690  From 7/13 showed   gluc 99, cr 0.60, gfr>60,  alt 30,         chol 207, tg 159, hdl 51, ldl-c 124, wbc 7.4, hb 14.3, plt 239, ua neg,   From 3/14 showed                  hba1c 5.7, chol 202, tg 154, hdl 50, ldl-c 121, wbc 9.4, hb 14.1, plt 246  From 3/15 showed   gluc 93, cr 0.55, gfr>60,  alt<5,         wbc 8.6, hb 15.1, plt 277, tsh 1.00  From 4/15 showed   gluc 97, cr 0.77, gfr>60,   hba1c 5.7, chol 205, tg 162, hdl 48, ldl-c 125, wbc 7.1, hb 14.2, plt 244  From 10/15 showed gluc 97, cr 0.70, gfr>60,   hba1c 5.7, chol 226, tg 223, hdl 46, ldl-c 135  From 11/16 showed gluc 97, cr 0.54, gfr 103, alt 16, hba1c 5.5, chol 190, tg 130, hdl 49, ldl-c 115, wbc 6.4, hb 14.7, plt 246, hep c neg    Patient Active Problem List   Diagnosis Code    Dyslipidemia E78.5    Bronchiectasis Dr Venessa Burnett J47.9    Sleep apnea, obstructive Dr. Jez Moyer G47.33    Asthma with COPD ([facility] [address] J44.9    Primary hypertension I10    Gastroesophageal reflux disease without esophagitis K21.9    IGT (impaired glucose tolerance) R73.02    Chronic insomnia F51.04    Severe obesity (BMI 35.0-39. 9) with comorbidity ([facility] [address] E66.01     Assessment and plan:  1. GERD. Continue current regimen. 2. HTN. Continue current  3. Obesity. Declined med supervised wt loss, bariatrics. Intermittent fasting discussed at length. Explained the concepts in detail.   Went over possible physiologic changes that could occur and how that would possibly impact her situation in a positive way. Discussed 16:8 program in particular. We also went over the differences between hunger and bessie hypoglycemia. Look up low insulin index foods. She will do some more research and consider implementing in the near future, standard handout given  4. Dyslipidemia. Lifestyle and dietary measures, check labs  5. Sinus. F/U Dr Thapa  6. JESUS. F/U Dr Andres Goldsmith  7. Asthma and COPD, bronchiectasis. Continue current, f/u Dr Tara Busby  8. Depression. Off meds  9. Insomnia. Refilled the valium        RTC 4/19    Above conditions discussed at length and patient vocalized understanding.   All questions answered to patient satifaction

## 2018-04-23 NOTE — MR AVS SNAPSHOT
[address] [facility] [address] 
 
 
 [address], [address] [facility] 
[phone] Patient: Misty Davis MRN: [id number] :1955 Visit Information Date & Time Provider Department Dept. Phone Encounter #  
 2018  8:20 Courtney Reed MD [facility] [phone] [id number] Your Appointments 2019  8:05 AM  
LAB with IOC NURSE VISIT [facility] ([address]) Appt Note: lab  
 [address], [address] [facility] [address] [place]  
  
   
 [address], [address]  
  
    
 2019  8:30 AM  
PHYSICAL with Duran Hardy MD  
[facility] [address] [facility]) Appt Note: [address]  
 [address] [facility], [address] [place] [address] [address]  
  
   
 [address], [address] Upcoming Health Maintenance Date Due ZOSTER VACCINE AGE 60> 2016 PAP AKA CERVICAL CYTOLOGY 10/23/2018 BREAST CANCER SCRN MAMMOGRAM 2018 COLONOSCOPY 10/13/2022 DTaP/Tdap/Td series (2 - Td) 3/29/2027 Allergies as of 2018  Review Complete On: 2018 By: Chantal Goff Severity Noted Reaction Type Reactions Accolate [Zafirlukast]    Other (comments) Headache? Daliresp [Roflumilast]  10/23/2015    Other (comments)  
 insomnia Lisinopril    Cough Pollen Extracts  2015    Cough Other reaction(s): wheezing/sob Soma [Carisoprodol]  2012    Other (comments)  
 headache Current Immunizations  Reviewed on 10/9/2017 Name Date Influenza Vaccine 10/1/2016, 10/1/2015, 10/1/2014, 10/1/2013, 10/1/2012 Pneumococcal Polysaccharide (PPSV-23) 10/30/2014 Tdap 3/29/2017 ZZZ-RETIRED (DO NOT USE) Pneumococcal Vaccine (Unspecified Type) 12/15/2006 Not reviewed this visit You Were Diagnosed With   
  
 Codes Comments Primary hypertension     ICD-10-CM: I10 
ICD-9-CM: 401.9 Vitals BP Pulse Temp Height(growth percentile) Weight(growth percentile) SpO2  
 120/66 (BP 1 Location: Left arm, BP Patient Position: Sitting) 69 99.1 °F (37.3 °C) (Oral) 5' 2\" (1.575 m) 201 lb (91.2 kg) 96% BMI OB Status Smoking Status 36.76 kg/m2 Postmenopausal Never Smoker Vitals History BMI and BSA Data Body Mass Index Body Surface Area  
 36.76 kg/m 2 2 m 2 Preferred Pharmacy Pharmacy Name Phone [facility], [place] [phone] Your Updated Medication List  
  
   
This list is accurate as of 4/23/18  9:13 AM.  Always use your most recent med list.  
  
  
  
  
 * albuterol 2.5 mg /3 mL (0.083 %) nebulizer solution Commonly known as:  PROVENTIL VENTOLIN  
2.5 mg.  
  
 * albuterol 90 mcg/actuation inhaler Commonly known as:  PROVENTIL HFA, VENTOLIN HFA, PROAIR HFA  
2 Puffs. amLODIPine 10 mg tablet Commonly known as:  Haig McDonald TAKE 1 TABLET BY MOUTH EVERY DAY  
  
 budesonide-formoterol 160-4.5 mcg/actuation Hfaa Commonly known as:  SYMBICORT Take 2 Puffs by inhalation two (2) times a day. chlorpheniramine-HYDROcodone 10-8 mg/5 mL suspension Commonly known as:  Krystal Dull Take 5 mL by mouth every twelve (12) hours as needed for Cough. Max Daily Amount: 10 mL.  
  
 cpap machine kit  
by Does Not Apply route. CPAP at setting of 15 CWP with ramp of 20 minutes with humidifier. Mask: Mirage Quattro FX small size. Length of need 99 months. Replace mask and accessories as needed times 12 months. Download data at 45 days and fax at [phone]. DALIRESP Tab tablet Generic drug:  roflumilast  
Take 500 mcg by mouth daily. esomeprazole 40 mg capsule Commonly known as:  NexIUM  
TAKE 1 CAPSULE BY MOUTH EVERY DAY  
  
 fluticasone 50 mcg/actuation nasal spray Commonly known as:  Ayaka Nolen 2 Sprays by Both Nostrils route daily. SPIRIVA WITH HANDIHALER 18 mcg inhalation capsule Generic drug:  tiotropium Take 1 Cap by inhalation daily. triamterene-hydroCHLOROthiazide 37.5-25 mg per capsule Commonly known as:  Filbert Mcghee TAKE 1 CAPSULE BY MOUTH EVERY DAY AS NEEDED FOR SWELLING  
  
 VITAMIN D3 2,000 unit Tab Generic drug:  cholecalciferol (vitamin D3) Take  by mouth. * Notice: This list has 2 medication(s) that are the same as other medications prescribed for you. Read the directions carefully, and ask your doctor or other care provider to review them with you. To-Do List   
 04/23/2018  3:00 PM  
  Appointment with Cyndie Coffman OT at [facility] ([phone]) Please bring insurance card, prescription from your doctor, list of medications and a picture ID. Please arrive 15-20 minutes before your appointment time to update your medical and personal information. Introducing [facility]! Dear Kurt Vergara: 
Thank you for requesting a [employer] account. Our records indicate that you already have an active Veriv[employer] account. You can access your account anytime at [email or web address] Did you know that you can access your hospital and ER discharge instructions at any time in Ver[employer]? You can also review all of your test results from your hospital stay or ER visit. Additional Information If you have questions, please visit the Frequently Asked Questions section of the Verivue website at [email or web address] Remember, Ver[employer]ue is NOT to be used for urgent needs. For medical emergencies, dial 911. Now available from your iPhone and Android! Please provide this summary of care documentation to your next provider. Your primary care clinician is listed as Jessika Ford. If you have any questions after today's visit, please call [phone].

## 2018-04-23 NOTE — PROGRESS NOTES
1. Have you been to the ER, urgent care clinic or hospitalized since your last visit? NO.     2. Have you seen or consulted any other health care providers outside of the [address] [place] [address] since your last visit (Include any pap smears or colon screening)? YES  Dr.Iiames Luong 59    Do you have an Advanced Directive?  YES    Chief Complaint   Patient presents with    Physical

## 2018-04-24 LAB
ALBUMIN SERPL-MCNC: 4 G/DL (ref 3.6–4.8)
ALBUMIN/GLOB SERPL: 1.5 {RATIO} (ref 1.2–2.2)
ALP SERPL-CCNC: 92 IU/L (ref 39–117)
ALT SERPL-CCNC: 19 IU/L (ref 0–32)
AST SERPL-CCNC: 20 IU/L (ref 0–40)
BILIRUB SERPL-MCNC: 0.3 MG/DL (ref 0–1.2)
BUN SERPL-MCNC: 19 MG/DL (ref 8–27)
BUN/CREAT SERPL: 35 (ref 12–28)
CALCIUM SERPL-MCNC: 9.3 MG/DL (ref 8.7–10.3)
CHLORIDE SERPL-SCNC: 103 MMOL/L (ref 96–106)
CHOLEST SERPL-MCNC: 229 MG/DL (ref 100–199)
CO2 SERPL-SCNC: 20 MMOL/L (ref 18–29)
CREAT SERPL-MCNC: 0.54 MG/DL (ref 0.57–1)
ERYTHROCYTE [DISTWIDTH] IN BLOOD BY AUTOMATED COUNT: 14 % (ref 12.3–15.4)
GFR SERPLBLD CREATININE-BSD FMLA CKD-EPI: 102 ML/MIN/1.73
GFR SERPLBLD CREATININE-BSD FMLA CKD-EPI: 118 ML/MIN/1.73
GLOBULIN SER CALC-MCNC: 2.7 G/DL (ref 1.5–4.5)
GLUCOSE SERPL-MCNC: 96 MG/DL (ref 65–99)
HBA1C MFR BLD: 5.4 % (ref 4.8–5.6)
HCT VFR BLD AUTO: 43.2 % (ref 34–46.6)
HDLC SERPL-MCNC: 57 MG/DL
HGB BLD-MCNC: 14.3 G/DL (ref 11.1–15.9)
INTERPRETATION, 910389: NORMAL
LDLC SERPL CALC-MCNC: 133 MG/DL (ref 0–99)
MCH RBC QN AUTO: 28.5 PG (ref 26.6–33)
MCHC RBC AUTO-ENTMCNC: 33.1 G/DL (ref 31.5–35.7)
MCV RBC AUTO: 86 FL (ref 79–97)
PLATELET # BLD AUTO: 277 X10E3/UL (ref 150–379)
POTASSIUM SERPL-SCNC: 4.2 MMOL/L (ref 3.5–5.2)
PROT SERPL-MCNC: 6.7 G/DL (ref 6–8.5)
RBC # BLD AUTO: 5.02 X10E6/UL (ref 3.77–5.28)
SODIUM SERPL-SCNC: 143 MMOL/L (ref 134–144)
T4 FREE SERPL-MCNC: 1.11 NG/DL (ref 0.82–1.77)
TRIGL SERPL-MCNC: 193 MG/DL (ref 0–149)
TSH SERPL DL<=0.005 MIU/L-ACNC: 0.76 UIU/ML (ref 0.45–4.5)
VLDLC SERPL CALC-MCNC: 39 MG/DL (ref 5–40)
WBC # BLD AUTO: 8.6 X10E3/UL (ref 3.4–10.8)

## 2018-04-25 DIAGNOSIS — F41.9 ANXIETY: ICD-10-CM

## 2018-04-25 DIAGNOSIS — I10 PRIMARY HYPERTENSION: ICD-10-CM

## 2018-04-25 DIAGNOSIS — G47.00 INSOMNIA, UNSPECIFIED TYPE: ICD-10-CM

## 2018-04-25 NOTE — TELEPHONE ENCOUNTER
Patient is calling in regards to the Diazepam and Valium. Stating a 30 day supply was sent to [facility] but she needs a 90 day supply sent to [facility]. She gets a better deal there. Also stating the Valium was not called in. She needs that sent to [facility] as well.

## 2018-04-25 NOTE — TELEPHONE ENCOUNTER
Valium and Diazepam are the same drug., will have to ask rd for a 90 day supply if he is ok with that

## 2018-04-25 NOTE — TELEPHONE ENCOUNTER
My mistake. I meant Amlodipine and Valium. She wants 90 day supply Amlodipine sent to [facility] as well as the Valium. Sorry for the confusion!

## 2018-04-26 RX ORDER — DIAZEPAM 10 MG/1
10 TABLET ORAL
Qty: 90 TAB | Refills: 1 | OUTPATIENT
Start: 2018-04-26 | End: 2018-10-09 | Stop reason: ALTCHOICE

## 2018-04-29 RX ORDER — AMLODIPINE BESYLATE 10 MG/1
TABLET ORAL
Qty: 90 TAB | Refills: 3 | Status: SHIPPED | OUTPATIENT
Start: 2018-04-29 | End: 2018-08-02 | Stop reason: SDUPTHER

## 2018-05-02 ENCOUNTER — TELEPHONE (OUTPATIENT)
Dept: INTERNAL MEDICINE CLINIC | Age: 63
End: 2018-05-02

## 2018-05-02 NOTE — TELEPHONE ENCOUNTER
pls call    Results for orders placed or performed in visit on 09/28/17   HEMOGLOBIN A1C W/O EAG   Result Value Ref Range    Hemoglobin A1c 5.4 4.8 - 5.6 %   T4, FREE   Result Value Ref Range    T4, Free 1.11 0.82 - 7.17 ng/dL   METABOLIC PANEL, COMPREHENSIVE   Result Value Ref Range    Glucose 96 65 - 99 mg/dL    BUN 19 8 - 27 mg/dL    Creatinine 0.54 (L) 0.57 - 1.00 mg/dL    GFR est non- >59 mL/min/1.73    GFR est  >59 mL/min/1.73    BUN/Creatinine ratio 35 (H) 12 - 28    Sodium 143 134 - 144 mmol/L    Potassium 4.2 3.5 - 5.2 mmol/L    Chloride 103 96 - 106 mmol/L    CO2 20 18 - 29 mmol/L    Calcium 9.3 8.7 - 10.3 mg/dL    Protein, total 6.7 6.0 - 8.5 g/dL    Albumin 4.0 3.6 - 4.8 g/dL    GLOBULIN, TOTAL 2.7 1.5 - 4.5 g/dL    A-G Ratio 1.5 1.2 - 2.2    Bilirubin, total 0.3 0.0 - 1.2 mg/dL    Alk.  phosphatase 92 39 - 117 IU/L    AST (SGOT) 20 0 - 40 IU/L    ALT (SGPT) 19 0 - 32 IU/L   CBC W/O DIFF   Result Value Ref Range    WBC 8.6 3.4 - 10.8 x10E3/uL    RBC 5.02 3.77 - 5.28 x10E6/uL    HGB 14.3 11.1 - 15.9 g/dL    HCT 43.2 34.0 - 46.6 %    MCV 86 79 - 97 fL    MCH 28.5 26.6 - 33.0 pg    MCHC 33.1 31.5 - 35.7 g/dL    RDW 14.0 12.3 - 15.4 %    PLATELET 133 040 - 838 x10E3/uL   LIPID PANEL   Result Value Ref Range    Cholesterol, total 229 (H) 100 - 199 mg/dL    Triglyceride 193 (H) 0 - 149 mg/dL    HDL Cholesterol 57 >39 mg/dL    VLDL, calculated 39 5 - 40 mg/dL    LDL, calculated 133 (H) 0 - 99 mg/dL   TSH 3RD GENERATION   Result Value Ref Range    TSH 0.761 0.450 - 4.500 uIU/mL   CVD REPORT   Result Value Ref Range    INTERPRETATION Note      Labs ok except elev chol  Lifestyle and dietary measures

## 2018-06-06 ENCOUNTER — TELEPHONE (OUTPATIENT)
Dept: INTERNAL MEDICINE CLINIC | Age: 63
End: 2018-06-06

## 2018-06-06 DIAGNOSIS — G47.00 INSOMNIA, UNSPECIFIED TYPE: Primary | ICD-10-CM

## 2018-06-06 NOTE — TELEPHONE ENCOUNTER
Pt is having trouble sleeping- can you prescribe something?       [facility]

## 2018-06-08 RX ORDER — ZOLPIDEM TARTRATE 10 MG/1
10 TABLET ORAL
Qty: 30 TAB | Refills: 0 | OUTPATIENT
Start: 2018-06-08 | End: 2018-10-09 | Stop reason: ALTCHOICE

## 2018-06-08 NOTE — TELEPHONE ENCOUNTER
[facility]ien called into [facility].      Pt aware of message below and verbalized understanding. No further questions or concerns from pt at this time.

## 2018-08-02 DIAGNOSIS — I10 PRIMARY HYPERTENSION: ICD-10-CM

## 2018-08-02 RX ORDER — AMLODIPINE BESYLATE 10 MG/1
10 TABLET ORAL DAILY
Qty: 90 TAB | Refills: 3 | Status: SHIPPED | OUTPATIENT
Start: 2018-08-02 | End: 2019-08-02 | Stop reason: SDUPTHER

## 2018-08-02 RX ORDER — ESOMEPRAZOLE MAGNESIUM 40 MG/1
40 CAPSULE, DELAYED RELEASE ORAL DAILY
Qty: 90 CAP | Refills: 3 | Status: SHIPPED | OUTPATIENT
Start: 2018-08-02 | End: 2019-08-08 | Stop reason: SDUPTHER

## 2018-08-02 NOTE — TELEPHONE ENCOUNTER
Insurance requires a minimum fill for 90 days. If appropriate, please sign pended medication order. If not, please notify me. Last Visit: 04/23/2018 with MD Mumtaz Renee    Next Appointment: 04/24/2019 with MD Mumtaz Renee     Requested Prescriptions     Pending Prescriptions Disp Refills    amLODIPine (NORVASC) 10 mg tablet 90 Tab 3     Sig: Take 1 Tab by mouth daily.  esomeprazole (NEXIUM) 40 mg capsule 90 Cap 3     Sig: Take 1 Cap by mouth daily.

## 2018-09-07 ENCOUNTER — OFFICE VISIT (OUTPATIENT)
Dept: INTERNAL MEDICINE CLINIC | Age: 63
End: 2018-09-07

## 2018-09-07 VITALS
SYSTOLIC BLOOD PRESSURE: 122 MMHG | DIASTOLIC BLOOD PRESSURE: 70 MMHG | RESPIRATION RATE: 14 BRPM | OXYGEN SATURATION: 96 % | WEIGHT: 210 LBS | BODY MASS INDEX: 38.64 KG/M2 | HEIGHT: 62 IN | TEMPERATURE: 99.1 F | HEART RATE: 71 BPM

## 2018-09-07 DIAGNOSIS — M62.838 MUSCLE SPASM: ICD-10-CM

## 2018-09-07 DIAGNOSIS — M70.61 TROCHANTERIC BURSITIS, RIGHT HIP: Primary | ICD-10-CM

## 2018-09-07 RX ORDER — CYCLOBENZAPRINE HCL 10 MG
10 TABLET ORAL
Qty: 30 TAB | Refills: 1 | Status: SHIPPED | OUTPATIENT
Start: 2018-09-07 | End: 2019-06-20

## 2018-09-07 RX ORDER — PREDNISONE 20 MG/1
20 TABLET ORAL
Qty: 5 TAB | Refills: 0 | Status: SHIPPED | OUTPATIENT
Start: 2018-09-07 | End: 2018-10-09 | Stop reason: ALTCHOICE

## 2018-09-07 NOTE — PROGRESS NOTES
65 y.o. WHITE OR  female who presents for evaluation. For the last 2 weeks or so, she has been having pain in the right la teral hip region. Tends to hurt when she walks, she has had sciatica in the past but this does not feel like it. There is a tender area she can press on which reproduces the pain. Also reproduces it when she is lying down directly on that spot. She has tried Aleve 3 tablets twice daily with mild/minimal improvement. No claudication symptoms. She does not recall any injury, specifically no falls or bruising. She is specifically concerned about avascular necrosis with her chronic but intermittent use of steroids, tried to get in with Dr. Jane Mancera but he is booked up for several weeks to months. Past Medical History:  
Diagnosis Date  Allergic rhinitis Dr. Joyce Mckeon on immunotherapy  Asthma FEV1/FVC 64%, preFEV1 51%, postFEV1 57%, %, DLCO 96% (2/15 - Dr Tilden Carrel)  Bronchiectasis ([facility]   
 since childhood, idiopathic; CF negx2, A1AT neg, IgE nl, aspergillus nAb neg, sputum AFB neg, PPD neg  Chronic obstructive pulmonary disease ([facility]  Colon adenoma 03/2012 Dr. Clara Moura 3/12; 10/13/17 polyp  Depression 04/2015 PHQ-9 was 11/27, DANNY-7 was 5/21; did well on lexapro  Dyslipidemia   
 calculated 10 year risk score was 2.5% (4/15), 3.9% (10/15); 5% (11/16)  GERD (gastroesophageal reflux disease)  Hypertension  IGT (impaired glucose tolerance)  Insomnia   
 no response to trazodone or [place]  Meralgia paresthetica of left side 7/31/2017  Morbid obesity with BMI of 40.0-44.9, adult ([facility] [address] [facility] peak weight 231 lbs, bmi 42.2 from 7/13; qsymia trial 4/14; declined med supervised wt loss, bariatrics w Dr Concha Shepard; IF 4/18  Pseudomonas respiratory infection 2015 Dr Lilibeth Boss  Sleep apnea, obstructive 10/12 Dr Juanjose Rockwell; AHI 15, RDI 15, lowest sat 84%, CPAP 15CWP; now seeing Dr Caleb Sage Current Outpatient Prescriptions Medication Sig  predniSONE (DELTASONE) 20 mg tablet Take 1 Tab by mouth daily (with breakfast).  cyclobenzaprine (FLEXERIL) 10 mg tablet Take 1 Tab by mouth three (3) times daily as needed for Muscle Spasm(s).  amLODIPine (NORVASC) 10 mg tablet Take 1 Tab by mouth daily.  esomeprazole (NEXIUM) 40 mg capsule Take 1 Cap by mouth daily.  cholecalciferol, vitamin D3, (VITAMIN D3) 2,000 unit tab Take  by mouth.  roflumilast (DALIRESP) tab tablet Take 500 mcg by mouth daily.  fluticasone (FLONASE) 50 mcg/actuation nasal spray 2 Sprays by Both Nostrils route daily.  budesonide-formoterol (SYMBICORT) 160-4.5 mcg/actuation HFA inhaler Take 2 Puffs by inhalation two (2) times a day.  albuterol (PROVENTIL VENTOLIN) 2.5 mg /3 mL (0.083 %) nebulizer solution 2.5 mg.  
 albuterol (PROVENTIL HFA, VENTOLIN HFA, PROAIR HFA) 90 mcg/actuation inhaler 2 Puffs.  tiotropium (SPIRIVA WITH HANDIHALER) 18 mcg inhalation capsule Take 1 Cap by inhalation daily.  cpap machine kit by Does Not Apply route. CPAP at setting of 15 CWP with ramp of 20 minutes with humidifier. Mask: Mirage Quattro FX small size. Length of need 99 months. Replace mask and accessories as needed times 12 months. Download data at 45 days and fax at [phone].  zolpidem (AMBIEN) 10 mg tablet Take 1 Tab by mouth nightly as needed for Sleep. Max Daily Amount: 10 mg.  
 diazePAM (VALIUM) 10 mg tablet Take 1 Tab by mouth every twelve (12) hours as needed for Anxiety. Max Daily Amount: 20 mg.  
 triamterene-hydrochlorothiazide (DYAZIDE) 37.5-25 mg per capsule TAKE 1 CAPSULE BY MOUTH EVERY DAY AS NEEDED FOR SWELLING No current facility-administered medications for this visit. Allergies Allergen Reactions  Accolate [Zafirlukast] Other (comments) Headache?  Daliresp [Roflumilast] Other (comments)  
  insomnia  Lisinopril Cough  Pollen Extracts Cough Other reaction(s): wheezing/sob  Soma [Carisoprodol] Other (comments)  
  headache Visit Vitals  /70  Pulse 71  Temp 99.1 °F (37.3 °C) (Oral)  Resp 14  
 Ht 5' 2\" (1.575 m)  Wt 210 lb (95.3 kg)  SpO2 96%  BMI 38.41 kg/m2 Back showed no CVA tenderness. Negative straight leg bilaterally, no pain on rotation of the hip joints. There was tenderness on palpation of the right trochanteric area. No pain on flexion/extension of the muscle groups. Abdomen soft and nontender, no hepatosplenomegaly or masses. Pulses 2+ DP and PT symmetrically. Assessment and plan: 1. Probable trochanteric bursitis. Long discussion. Declined x-rays, she will await 's official evaluation in a few weeks. I gave her prednisone 5 days. 2.  In passing, she described a lot of upper back spasms, I gave her Flexeril. Above conditions discussed at length and patient vocalized understanding. All questions answered to patient satisfaction

## 2018-09-07 NOTE — MR AVS SNAPSHOT
[address] [facility] [address] 
 
 
 [address], [address] [place] [address] [facility] 
[phone] Patient: Jeronimo Alvarado MRN: [id number] :1955 Visit Information Date & Time Provider Department Dept. Phone Encounter #  
 2018  9:20 AM Lukasz Hanks MD [facility] [id number] Your Appointments 2019  8:05 AM  
LAB with [facility] NURSE VISIT [facility] (Willem Quintero) Appt Note: lab  
 [address], [address] [place] [address] [facility] [address] [place]  
  
   
 [address], [address]  
  
    
 2019  8:30 AM  
PHYSICAL with Lukasz Hanks MD  
[facility]) Appt Note: [address]  
 [address] [facility], [address] [address] [address] [place]  
  
   
 [address], [address] Upcoming Health Maintenance Date Due ZOSTER VACCINE AGE 60> 2016 Influenza Age 5 to Adult 2018 PAP AKA CERVICAL CYTOLOGY 10/23/2018 BREAST CANCER SCRN MAMMOGRAM 2018 COLONOSCOPY 10/13/2022 DTaP/Tdap/Td series (2 - Td) 3/29/2027 Allergies as of 2018  Review Complete On: 2018 By: Michael Mora LPN Severity Noted Reaction Type Reactions Accolate [Zafirlukast]    Other (comments) Headache? Daliresp [Roflumilast]  10/23/2015    Other (comments)  
 insomnia Lisinopril    Cough Pollen Extracts  2015    Cough Other reaction(s): wheezing/sob Soma [Carisoprodol]  2012    Other (comments)  
 headache Current Immunizations  Reviewed on 10/9/2017 Name Date Influenza Vaccine 10/1/2016, 10/1/2015, 10/1/2014, 10/1/2013, 10/1/2012 Pneumococcal Polysaccharide (PPSV-23) 10/30/2014 Tdap 3/29/2017 ZZZ-RETIRED (DO NOT USE) Pneumococcal Vaccine (Unspecified Type) 12/15/2006 Not reviewed this visit Vitals BP Pulse Temp Resp Height(growth percentile) Weight(growth percentile) 122/70 71 99.1 °F (37.3 °C) (Oral) 14 5' 2\" (1.575 m) 210 lb (95.3 kg) SpO2 BMI OB Status Smoking Status 96% 38.41 kg/m2 Postmenopausal Never Smoker Vitals History BMI and BSA Data Body Mass Index Body Surface Area  
 38.41 kg/m 2 2.04 m 2 Preferred Pharmacy Pharmacy Name Phone [address] [phone] Your Updated Medication List  
  
   
This list is accurate as of 9/7/18  9:40 AM.  Always use your most recent med list.  
  
  
  
  
 * albuterol 2.5 mg /3 mL (0.083 %) nebulizer solution Commonly known as:  PROVENTIL VENTOLIN  
2.5 mg.  
  
 * albuterol 90 mcg/actuation inhaler Commonly known as:  PROVENTIL HFA, VENTOLIN HFA, PROAIR HFA  
2 Puffs. amLODIPine 10 mg tablet Commonly known as:  Doss Mullet Take 1 Tab by mouth daily. budesonide-formoterol 160-4.5 mcg/actuation Hfaa Commonly known as:  SYMBICORT Take 2 Puffs by inhalation two (2) times a day. cpap machine kit  
by Does Not Apply route. CPAP at setting of 15 CWP with ramp of 20 minutes with humidifier. Mask: Mirage Quattro FX small size. Length of need 99 months. Replace mask and accessories as needed times 12 months. Download data at 45 days and fax at [phone]. DALIRESP Tab tablet Generic drug:  roflumilast  
Take 500 mcg by mouth daily. diazePAM 10 mg tablet Commonly known as:  VALIUM Take 1 Tab by mouth every twelve (12) hours as needed for Anxiety. Max Daily Amount: 20 mg.  
  
 esomeprazole 40 mg capsule Commonly known as:  NexIUM Take 1 Cap by mouth daily. fluticasone 50 mcg/actuation nasal spray Commonly known as:  Gauri Fetch 2 Sprays by Both Nostrils route daily. SPIRIVA WITH HANDIHALER 18 mcg inhalation capsule Generic drug:  tiotropium Take 1 Cap by inhalation daily. triamterene-hydroCHLOROthiazide 37.5-25 mg per capsule Commonly known as:  March Gene TAKE 1 CAPSULE BY MOUTH EVERY DAY AS NEEDED FOR SWELLING  
  
 VITAMIN D3 2,000 unit Tab Generic drug:  cholecalciferol (vitamin D3) Take  by mouth.  
  
 zolpidem 10 mg tablet Commonly known as:  AMBIEN Take 1 Tab by mouth nightly as needed for Sleep. Max Daily Amount: 10 mg.  
  
 * Notice: This list has 2 medication(s) that are the same as other medications prescribed for you. Read the directions carefully, and ask your doctor or other care provider to review them with you. Introducing [facility]! Dear Earnestine Salgado: 
Thank you for requesting a [employer] account. Our records indicate that you already have an active [employer] account. You can access your account anytime at [email or web address]. [employer] Did you know that you can access your hospital and ER discharge instructions at any time in [employer]? You can also review all of your test results from your hospital stay or ER visit. Additional Information If you have questions, please visit the Frequently Asked Questions section of the [employer] website at [email or web address]/. Remember, [employer] is NOT to be used for urgent needs. For medical emergencies, dial 911. Now available from your iPhone and Android! Please provide this summary of care documentation to your next provider. Your primary care clinician is listed as Drinda Epley. If you have any questions after today's visit, please call [phone].

## 2018-09-07 NOTE — PROGRESS NOTES
1. Have you been to the ER, urgent care clinic or hospitalized since your last visit? NO.  
 
2. Have you seen or consulted any other health care providers outside of the [address] [place] [address] since your last visit (Include any pap smears or colon screening)? NO Do you have an Advanced Directive? YES Chief Complaint Patient presents with  
 Hip Pain  
  off and on several weeks. lower back pain

## 2018-10-09 ENCOUNTER — OFFICE VISIT (OUTPATIENT)
Dept: INTERNAL MEDICINE CLINIC | Age: 63
End: 2018-10-09

## 2018-10-09 VITALS
BODY MASS INDEX: 38.35 KG/M2 | SYSTOLIC BLOOD PRESSURE: 118 MMHG | WEIGHT: 208.4 LBS | DIASTOLIC BLOOD PRESSURE: 60 MMHG | HEART RATE: 73 BPM | HEIGHT: 62 IN | TEMPERATURE: 98.4 F | RESPIRATION RATE: 15 BRPM | OXYGEN SATURATION: 97 %

## 2018-10-09 DIAGNOSIS — J01.10 ACUTE FRONTAL SINUSITIS, RECURRENCE NOT SPECIFIED: Primary | ICD-10-CM

## 2018-10-09 RX ORDER — FLUTICASONE PROPIONATE 50 MCG
2 SPRAY, SUSPENSION (ML) NASAL DAILY
Qty: 1 BOTTLE | Refills: 3 | Status: SHIPPED | OUTPATIENT
Start: 2018-10-09 | End: 2020-12-18

## 2018-10-09 RX ORDER — AMOXICILLIN AND CLAVULANATE POTASSIUM 875; 125 MG/1; MG/1
1 TABLET, FILM COATED ORAL 2 TIMES DAILY
Qty: 20 TAB | Refills: 0 | Status: SHIPPED | OUTPATIENT
Start: 2018-10-09 | End: 2018-10-19

## 2018-10-09 NOTE — MR AVS SNAPSHOT
[address] [facility] [address] 
 
 
 [address], [address] [place] [address] [facility] 
[phone] Patient: Salina Madrigal MRN: [id number] :1955 Visit Information Date & Time Provider Department Dept. Phone Encounter #  
 10/9/2018 10:00 AM Pina Velasco NP [facility] [phone] [id number] Your Appointments 2019  8:05 AM  
LAB with [facility] NURSE VISIT [facility] ([address]) Appt Note: lab  
 [address], [address] [place] [address] [facility] [address] [place]  
  
   
 [address], [address]  
  
    
 2019  8:30 AM  
PHYSICAL with Sindy Velázquez MD  
[facility] [address] [place] [address]) Appt Note: [address]  
 [address] [facility], [address] [address] [address] [place]  
  
   
 [address], [address] Upcoming Health Maintenance Date Due Shingrix Vaccine Age 50> (1 of 2) 10/23/2006 PAP AKA CERVICAL CYTOLOGY 10/23/2018 BREAST CANCER SCRN MAMMOGRAM 2018 Influenza Age 5 to Adult 2019* COLONOSCOPY 10/13/2022 DTaP/Tdap/Td series (2 - Td) 3/29/2027 *Topic was postponed. The date shown is not the original due date. Allergies as of 10/9/2018  Review Complete On: 10/9/2018 By: Pina Velasco NP Severity Noted Reaction Type Reactions Accolate [Zafirlukast]    Other (comments) Headache? Daliresp [Roflumilast]  10/23/2015    Other (comments)  
 insomnia Lisinopril    Cough Pollen Extracts  2015    Cough Other reaction(s): wheezing/sob Soma [Carisoprodol]  2012    Other (comments)  
 headache Current Immunizations  Reviewed on 10/9/2017 Name Date Influenza Vaccine 10/1/2016, 10/1/2015, 10/1/2014, 10/1/2013, 10/1/2012 Pneumococcal Polysaccharide (PPSV-23) 10/30/2014 Tdap 3/29/2017 ZZZ-RETIRED (DO NOT USE) Pneumococcal Vaccine (Unspecified Type) 12/15/2006 Not reviewed this visit You Were Diagnosed With   
  
 Codes Comments Acute frontal sinusitis, recurrence not specified    -  Primary ICD-10-CM: J01.10 ICD-9-CM: 809.0 Vitals BP Pulse Temp Resp Height(growth percentile) Weight(growth percentile)  
 118/60 (BP 1 Location: Left arm, BP Patient Position: Sitting) 73 98.4 °F (36.9 °C) (Oral) 15 5' 2\" (1.575 m) 208 lb 6.4 oz (94.5 kg) SpO2 BMI OB Status Smoking Status 97% 38.12 kg/m2 Postmenopausal Never Smoker Vitals History BMI and BSA Data Body Mass Index Body Surface Area  
 38.12 kg/m 2 2.03 m 2 Preferred Pharmacy Pharmacy Name Phone [facility] [place], [address] [place] [address] [phone] Your Updated Medication List  
  
   
This list is accurate as of 10/9/18 10:42 AM.  Always use your most recent med list.  
  
  
  
  
 * albuterol 2.5 mg /3 mL (0.083 %) nebulizer solution Commonly known as:  PROVENTIL VENTOLIN  
2.5 mg.  
  
 * albuterol 90 mcg/actuation inhaler Commonly known as:  PROVENTIL HFA, VENTOLIN HFA, PROAIR HFA  
2 Puffs. amLODIPine 10 mg tablet Commonly known as:  Herschell Saras Take 1 Tab by mouth daily. amoxicillin-clavulanate 875-125 mg per tablet Commonly known as:  AUGMENTIN Take 1 Tab by mouth two (2) times a day for 10 days. budesonide-formoterol 160-4.5 mcg/actuation Hfaa Commonly known as:  SYMBICORT Take 2 Puffs by inhalation two (2) times a day. cpap machine kit  
by Does Not Apply route. CPAP at setting of 15 CWP with ramp of 20 minutes with humidifier. Mask: Mirage Quattro FX small size. Length of need 99 months. Replace mask and accessories as needed times 12 months. Download data at 45 days and fax at [phone]. cyclobenzaprine 10 mg tablet Commonly known as:  FLEXERIL  
 Take 1 Tab by mouth three (3) times daily as needed for Muscle Spasm(s). DALIRESP Tab tablet Generic drug:  roflumilast  
Take 500 mcg by mouth daily. esomeprazole 40 mg capsule Commonly known as:  NexIUM Take 1 Cap by mouth daily. fluticasone 50 mcg/actuation nasal spray Commonly known as:  Maddison De Los Santos 2 Sprays by Both Nostrils route daily. SPIRIVA WITH HANDIHALER 18 mcg inhalation capsule Generic drug:  tiotropium Take 1 Cap by inhalation daily. VITAMIN D3 2,000 unit Tab Generic drug:  cholecalciferol (vitamin D3) Take  by mouth. * Notice: This list has 2 medication(s) that are the same as other medications prescribed for you. Read the directions carefully, and ask your doctor or other care provider to review them with you. Prescriptions Sent to Pharmacy Refills  
 fluticasone (FLONASE) 50 mcg/actuation nasal spray 3 Si Sprays by Both Nostrils route daily. Class: Normal  
 Pharmacy: [address] #: [phone] Route: Both Nostrils  
 amoxicillin-clavulanate (AUGMENTIN) 875-125 mg per tablet 0 Sig: Take 1 Tab by mouth two (2) times a day for 10 days. Class: Normal  
 Pharmacy: [address] #: [phone] Route: Oral  
  
Introducing \[facility]\"" & [facility] SERVICES! Dear Cass Garzon: 
Thank you for requesting a [facility] account. Our records indicate that you already have an active [facility] account. You can access your account anytime at [email or web address]. [employer] Did you know that you can access your hospital and ER discharge instructions at any time in [facility]? You can also review all of your test results from your hospital stay or ER visit. Additional Information If you have questions, please visit the Frequently Asked Questions section of the [facility] website at [email or web address]/. Remember, MyChart is NOT to be used for urgent needs. For medical emergencies, dial 911. Now available from your iPhone and Android! Please provide this summary of care documentation to your next provider. Your primary care clinician is listed as Gabriel John. If you have any questions after today's visit, please call [phone].

## 2018-10-09 NOTE — PROGRESS NOTES
1. Have you been to the ER, urgent care clinic or hospitalized since your last visit? NO.     2. Have you seen or consulted any other health care providers outside of the [address] [place] [address] since your last visit (Include any pap smears or colon screening)? NO      Do you have an Advanced Directive? NO    Would you like information on Advanced Directives?  NO

## 2018-10-09 NOTE — PROGRESS NOTES
Melba Santos is a 65 y.o.  female and presents with    Chief Complaint   Patient presents with    Headache     Patient here frontal and the top of the head. Patient reports its a stabbing headache that she had to leave work and go lay down. Patient reports headache last approx 2-3 hors starting 3 days ago. Patient reports when she was at work she threw up and then when she got home she threw up again prior to laying down. Subjective:  HPI  Mrs. Sonu Aleman presents today with complaints of frontal head pain x 2 episodes accompanied by vomitting. She reports chronic sinus issues, bronchiectasis, copd. She is using nasal saline, antihistamines, Flonase but denies full relief. First episode occurred on Sunday night and was driving home at 738-9 pm, top of head with sharp pain, got home vomitted, laid down. Then last night working and at 6 pm headache occurred same region to the frontal/top to the head, she works in the [place] so nurses checked /76, she vomitted again, ice on head, no relief, went home from work, took Excedrin, laid down, took 3 hours before she could get up. Today she denies headache, but a foggy feeling and with pressure to the frontal and maxillary sinuses with a productive cough with green mucus. Strong [address] of migraines. She denies ETOH intake. She denies reports could drink more water but no change from normal intake. Received the flu shot Friday, no issues in the past. No exposure to different foods, she has loss of smell due to chronic sinusitis, no increased life stressors. She does report poor sleep but this has been x 4-5 years, failing Trazodone, Ambien, Melatonin, sleep hygiene. Additional Concerns: none     ROS   Review of Systems   Constitutional: Negative for chills, diaphoresis, fever and malaise/fatigue. Eyes: Negative. Respiratory: Positive for cough and sputum production. Negative for hemoptysis, shortness of breath and wheezing.     Cardiovascular: Negative. Gastrointestinal: Negative. Genitourinary: Negative. Skin: Negative for rash. Neurological: Positive for headaches. Negative for dizziness. Allergies   Allergen Reactions    Accolate [Zafirlukast] Other (comments)     Headache?  Daliresp [Roflumilast] Other (comments)     insomnia    Lisinopril Cough    Pollen Extracts Cough     Other reaction(s): wheezing/sob    Soma [Carisoprodol] Other (comments)     headache       Current Outpatient Prescriptions   Medication Sig Dispense Refill    fluticasone (FLONASE) 50 mcg/actuation nasal spray 2 Sprays by Both Nostrils route daily. 1 Bottle 3    amoxicillin-clavulanate (AUGMENTIN) 875-125 mg per tablet Take 1 Tab by mouth two (2) times a day for 10 days. 20 Tab 0    cyclobenzaprine (FLEXERIL) 10 mg tablet Take 1 Tab by mouth three (3) times daily as needed for Muscle Spasm(s). 30 Tab 1    amLODIPine (NORVASC) 10 mg tablet Take 1 Tab by mouth daily. 90 Tab 3    esomeprazole (NEXIUM) 40 mg capsule Take 1 Cap by mouth daily. 90 Cap 3    cholecalciferol, vitamin D3, (VITAMIN D3) 2,000 unit tab Take  by mouth.  roflumilast (DALIRESP) tab tablet Take 500 mcg by mouth daily.  budesonide-formoterol (SYMBICORT) 160-4.5 mcg/actuation HFA inhaler Take 2 Puffs by inhalation two (2) times a day.  albuterol (PROVENTIL VENTOLIN) 2.5 mg /3 mL (0.083 %) nebulizer solution 2.5 mg.      albuterol (PROVENTIL HFA, VENTOLIN HFA, PROAIR HFA) 90 mcg/actuation inhaler 2 Puffs.  tiotropium (SPIRIVA WITH HANDIHALER) 18 mcg inhalation capsule Take 1 Cap by inhalation daily.  cpap machine kit by Does Not Apply route. CPAP at setting of 15 CWP with ramp of 20 minutes with humidifier. Mask: Mirage Quattro FX small size. Length of need 99 months. Replace mask and accessories as needed times 12 months.   Download data at 45 days and fax at [phone]. 1 Kit 0       Social History     Social History    Marital status:      Spouse name: N/A    Number of children: N/A    Years of education: N/A     Occupational History    RN [employer]     Social History Main Topics    Smoking status: Never Smoker    Smokeless tobacco: Never Used    Alcohol use No    Drug use: No    Sexual activity: Not on file     Other Topics Concern    Not on file     Social History Narrative       Past Medical History:   Diagnosis Date    Allergic rhinitis     Dr. Guerita Carvajal on immunotherapy    Asthma     FEV1/FVC 64%, preFEV1 51%, postFEV1 57%, %, DLCO 96% (2/15 - Dr Reymundo Patel)     Bronchiectasis [facility])     since childhood, idiopathic; CF negx2, A1AT neg, IgE nl, aspergillus nAb neg, sputum AFB neg, PPD neg    Chronic obstructive pulmonary disease ([facility] [address]     Colon adenoma 03/2012    Dr. Nadine Berry 3/12; 10/13/17 polyp    Depression 04/2015    PHQ-9 was 11/27, DANNY-7 was 5/21; did well on lexapro    Dyslipidemia     calculated 10 year risk score was 2.5% (4/15), 3.9% (10/15); 5% (11/16)    GERD (gastroesophageal reflux disease)     Hypertension     IGT (impaired glucose tolerance)     Insomnia     no response to trazodone or ambien    Meralgia paresthetica of left side 7/31/2017    Morbid obesity with BMI of 40.0-44.9, adult ([facility])     peak weight 231 lbs, bmi 42.2 from 7/13; qsymia trial 4/14; declined med supervised wt loss, bariatrics w Dr Rosey Chanel; IF 4/18    Pseudomonas respiratory infection     2015 Dr Garcia Vizcarra    Sleep apnea, obstructive 10/12    Dr Ean Gibson; AHI 15, RDI 15, lowest sat 84%, CPAP 15CWP; now seeing Dr Zach Rojas       Past Surgical History:   Procedure Laterality Date   [address] UNLIST  2/15    echo nl lv, ef 55%, nl wma, mild mr [facility])    CARDIAC SURG PROCEDURE UNLIST  2/15    holter negative    CHEST SURGERY PROCEDURE UNLISTED  03/2017    lung biopsy [facility]    EGD  05/94    HX CATARACT REMOVAL  2017    Dr Luh Berry 3/12 adenoma; 10/13/17 polyp    HX HEENT Sinus Surgery X2; Dr Blanca Lees 3/17    HX KNEE ARTHROSCOPY Left 6/14    meniscus surgery Dr Josefina Rizo  07/06    s/p JOSE Lobectomy Dr. Kenia Pearl 2006       Family History   Problem Relation Age of Onset    Hypertension Mother     Elevated Lipids Mother     Cancer Father     Colon Polyps Father     Arthritis-osteo Father     Crohn's Disease Sister     Migraines Brother        Objective:  Vitals:    10/09/18 1000   BP: 118/60   Pulse: 73   Resp: 15   Temp: 98.4 °F (36.9 °C)   TempSrc: Oral   SpO2: 97%   Weight: 208 lb 6.4 oz (94.5 kg)   Height: 5' 2\" (1.575 m)   PainSc:   2   PainLoc: Head       LABS   Results for orders placed or performed in visit on 09/28/17   HEMOGLOBIN A1C W/O EAG   Result Value Ref Range    Hemoglobin A1c 5.4 4.8 - 5.6 %   T4, FREE   Result Value Ref Range    T4, Free 1.11 0.82 - 8.13 ng/dL   METABOLIC PANEL, COMPREHENSIVE   Result Value Ref Range    Glucose 96 65 - 99 mg/dL    BUN 19 8 - 27 mg/dL    Creatinine 0.54 (L) 0.57 - 1.00 mg/dL    GFR est non- >59 mL/min/1.73    GFR est  >59 mL/min/1.73    BUN/Creatinine ratio 35 (H) 12 - 28    Sodium 143 134 - 144 mmol/L    Potassium 4.2 3.5 - 5.2 mmol/L    Chloride 103 96 - 106 mmol/L    CO2 20 18 - 29 mmol/L    Calcium 9.3 8.7 - 10.3 mg/dL    Protein, total 6.7 6.0 - 8.5 g/dL    Albumin 4.0 3.6 - 4.8 g/dL    GLOBULIN, TOTAL 2.7 1.5 - 4.5 g/dL    A-G Ratio 1.5 1.2 - 2.2    Bilirubin, total 0.3 0.0 - 1.2 mg/dL    Alk.  phosphatase 92 39 - 117 IU/L    AST (SGOT) 20 0 - 40 IU/L    ALT (SGPT) 19 0 - 32 IU/L   CBC W/O DIFF   Result Value Ref Range    WBC 8.6 3.4 - 10.8 x10E3/uL    RBC 5.02 3.77 - 5.28 x10E6/uL    HGB 14.3 11.1 - 15.9 g/dL    HCT 43.2 34.0 - 46.6 %    MCV 86 79 - 97 fL    MCH 28.5 26.6 - 33.0 pg    MCHC 33.1 31.5 - 35.7 g/dL    RDW 14.0 12.3 - 15.4 %    PLATELET 128 817 - 655 x10E3/uL   LIPID PANEL   Result Value Ref Range    Cholesterol, total 229 (H) 100 - 199 mg/dL    Triglyceride 193 (H) 0 - 149 mg/dL    HDL Cholesterol 57 >39 mg/dL    VLDL, calculated 39 5 - 40 mg/dL    LDL, calculated 133 (H) 0 - 99 mg/dL   TSH 3RD GENERATION   Result Value Ref Range    TSH 0.761 0.450 - 4.500 uIU/mL   CVD REPORT   Result Value Ref Range    INTERPRETATION Note        TESTS  none    PE  Physical Exam   Constitutional: She is oriented to person, place, and time. She appears well-developed and well-nourished. No distress. HENT:   Head: Normocephalic and atraumatic. Right Ear: External ear normal.   Left Ear: External ear normal.   Nose: Nose normal.   Mouth/Throat: Oropharynx is clear and moist. No oropharyngeal exudate. Frontal and maxillary pressure on examination   Eyes: Conjunctivae and EOM are normal. Pupils are equal, round, and reactive to light. Right eye exhibits no discharge. Left eye exhibits no discharge. Neck: Normal range of motion. Neck supple. Cardiovascular: Normal rate, regular rhythm, normal heart sounds and intact distal pulses. No murmur heard. Pulmonary/Chest: Effort normal and breath sounds normal. No respiratory distress. She has no wheezes. She has no rales. She exhibits no tenderness. Abdominal: Soft. Bowel sounds are normal.   Musculoskeletal: Normal range of motion. Lymphadenopathy:     She has no cervical adenopathy. Neurological: She is alert and oriented to person, place, and time. Skin: Skin is warm and dry. She is not diaphoretic. Psychiatric: She has a normal mood and affect. Her behavior is normal. Judgment and thought content normal.   Vitals reviewed. Assessment/Plan:    1. Possible sinusitis unspecified, migraine headache- She is without constitutional symptoms today. She has hx of COPD, bronchiectasis, and chronic sinusitis with hx of surgery. Will start Augmentin BID x 10 days, she is with pain/pressure to frontal and maxillary sinus with a productive cough of greenish sputum.  She will continue antihistamine, nasal saline rinses, and Flonase refilled today. Possible migraine headache- ok to use Excedrin HA for control discussed to monitor and report if continues, she is with poor sleep with multiple pharmacological failures, she uses CPAP reports no issues, BP during last HA was ok range. Lab review: no lab studies available for review at time of visit    Today's Visit:   Diagnoses and all orders for this visit:    1. Acute frontal sinusitis, recurrence not specified  -     fluticasone (FLONASE) 50 mcg/actuation nasal spray; 2 Sprays by Both Nostrils route daily. -     amoxicillin-clavulanate (AUGMENTIN) 875-125 mg per tablet; Take 1 Tab by mouth two (2) times a day for 10 days. Health Maintenance: Deferred to PCP. I have discussed the diagnosis with the patient and the intended plan as seen in the above orders. The patient has received an after-visit summary and questions were answered concerning future plans. I have discussed medication side effects and warnings with the patient as well. I have reviewed the plan of care with the patient, accepted their input and they are in agreement with the treatment goals. Follow-up Disposition: Not on File   More than 1/2 of this 30 minute visit was spent in counseling and coordination of care, as described above.     MARTINEZ Irving  Internist of [address], [address] [place] [address].  Phone: [phone]  Fax: [phone]

## 2019-01-30 ENCOUNTER — TELEPHONE (OUTPATIENT)
Dept: INTERNAL MEDICINE CLINIC | Age: 64
End: 2019-01-30

## 2019-02-25 ENCOUNTER — TELEPHONE (OUTPATIENT)
Dept: INTERNAL MEDICINE CLINIC | Age: 64
End: 2019-02-25

## 2019-06-14 ENCOUNTER — HOSPITAL ENCOUNTER (OUTPATIENT)
Dept: LAB | Age: 64
Discharge: HOME | End: 2019-06-14
Payer: COMMERCIAL

## 2019-06-14 ENCOUNTER — LAB ONLY (OUTPATIENT)
Dept: INTERNAL MEDICINE CLINIC | Age: 64
End: 2019-06-14

## 2019-06-14 DIAGNOSIS — I10 PRIMARY HYPERTENSION: ICD-10-CM

## 2019-06-14 DIAGNOSIS — I10 PRIMARY HYPERTENSION: Primary | ICD-10-CM

## 2019-06-14 DIAGNOSIS — E78.5 DYSLIPIDEMIA: ICD-10-CM

## 2019-06-14 LAB
ALBUMIN SERPL-MCNC: 3.6 G/DL (ref 3.4–5)
ALBUMIN/GLOB SERPL: 1.2 {RATIO} (ref 0.8–1.7)
ALP SERPL-CCNC: 103 U/L (ref 45–117)
ALT SERPL-CCNC: 25 U/L (ref 13–56)
ANION GAP SERPL CALC-SCNC: 7 MMOL/L (ref 3–18)
AST SERPL-CCNC: 14 U/L (ref 15–37)
BASOPHILS # BLD: 0.1 K/UL (ref 0–0.1)
BASOPHILS NFR BLD: 1 % (ref 0–2)
BILIRUB SERPL-MCNC: 0.4 MG/DL (ref 0.2–1)
BUN SERPL-MCNC: 17 MG/DL (ref 7–18)
BUN/CREAT SERPL: 26 (ref 12–20)
CALCIUM SERPL-MCNC: 8.6 MG/DL (ref 8.5–10.1)
CHLORIDE SERPL-SCNC: 110 MMOL/L (ref 100–108)
CHOLEST SERPL-MCNC: 211 MG/DL
CO2 SERPL-SCNC: 25 MMOL/L (ref 21–32)
CREAT SERPL-MCNC: 0.65 MG/DL (ref 0.6–1.3)
DIFFERENTIAL METHOD BLD: NORMAL
EOSINOPHIL # BLD: 0.2 K/UL (ref 0–0.4)
EOSINOPHIL NFR BLD: 3 % (ref 0–5)
ERYTHROCYTE [DISTWIDTH] IN BLOOD BY AUTOMATED COUNT: 13.1 % (ref 11.6–14.5)
GLOBULIN SER CALC-MCNC: 3.1 G/DL (ref 2–4)
GLUCOSE SERPL-MCNC: 95 MG/DL (ref 74–99)
HCT VFR BLD AUTO: 42.5 % (ref 35–45)
HDLC SERPL-MCNC: 53 MG/DL (ref 40–60)
HDLC SERPL: 4 {RATIO} (ref 0–5)
HGB BLD-MCNC: 14.6 G/DL (ref 12–16)
LDLC SERPL CALC-MCNC: 119.6 MG/DL (ref 0–100)
LIPID PROFILE,FLP: ABNORMAL
LYMPHOCYTES # BLD: 1.8 K/UL (ref 0.9–3.6)
LYMPHOCYTES NFR BLD: 31 % (ref 21–52)
MCH RBC QN AUTO: 29 PG (ref 24–34)
MCHC RBC AUTO-ENTMCNC: 34.4 G/DL (ref 31–37)
MCV RBC AUTO: 84.3 FL (ref 74–97)
MONOCYTES # BLD: 0.5 K/UL (ref 0.05–1.2)
MONOCYTES NFR BLD: 8 % (ref 3–10)
NEUTS SEG # BLD: 3.4 K/UL (ref 1.8–8)
NEUTS SEG NFR BLD: 57 % (ref 40–73)
PLATELET # BLD AUTO: 244 K/UL (ref 135–420)
PMV BLD AUTO: 10.2 FL (ref 9.2–11.8)
POTASSIUM SERPL-SCNC: 4 MMOL/L (ref 3.5–5.5)
PROT SERPL-MCNC: 6.7 G/DL (ref 6.4–8.2)
RBC # BLD AUTO: 5.04 M/UL (ref 4.2–5.3)
SODIUM SERPL-SCNC: 142 MMOL/L (ref 136–145)
TRIGL SERPL-MCNC: 192 MG/DL (ref ?–150)
TSH SERPL DL<=0.05 MIU/L-ACNC: 0.97 UIU/ML (ref 0.36–3.74)
VLDLC SERPL CALC-MCNC: 38.4 MG/DL
WBC # BLD AUTO: 5.9 K/UL (ref 4.6–13.2)

## 2019-06-14 PROCEDURE — 80061 LIPID PANEL: CPT

## 2019-06-14 PROCEDURE — 36415 COLL VENOUS BLD VENIPUNCTURE: CPT

## 2019-06-14 PROCEDURE — 85025 COMPLETE CBC W/AUTO DIFF WBC: CPT

## 2019-06-14 PROCEDURE — 84443 ASSAY THYROID STIM HORMONE: CPT

## 2019-06-14 PROCEDURE — 80053 COMPREHEN METABOLIC PANEL: CPT

## 2019-06-20 ENCOUNTER — OFFICE VISIT (OUTPATIENT)
Dept: INTERNAL MEDICINE CLINIC | Age: 64
End: 2019-06-20

## 2019-06-20 VITALS
WEIGHT: 222 LBS | HEART RATE: 70 BPM | HEIGHT: 62 IN | RESPIRATION RATE: 14 BRPM | BODY MASS INDEX: 40.85 KG/M2 | SYSTOLIC BLOOD PRESSURE: 129 MMHG | OXYGEN SATURATION: 94 % | DIASTOLIC BLOOD PRESSURE: 70 MMHG | TEMPERATURE: 99.4 F

## 2019-06-20 DIAGNOSIS — R73.02 IGT (IMPAIRED GLUCOSE TOLERANCE): ICD-10-CM

## 2019-06-20 DIAGNOSIS — G47.33 SLEEP APNEA, OBSTRUCTIVE: ICD-10-CM

## 2019-06-20 DIAGNOSIS — I10 PRIMARY HYPERTENSION: ICD-10-CM

## 2019-06-20 DIAGNOSIS — E78.5 DYSLIPIDEMIA: ICD-10-CM

## 2019-06-20 DIAGNOSIS — J47.9 BRONCHIECTASIS WITHOUT COMPLICATION (HCC): ICD-10-CM

## 2019-06-20 DIAGNOSIS — F41.9 ANXIETY: ICD-10-CM

## 2019-06-20 DIAGNOSIS — E66.01 SEVERE OBESITY (BMI 35.0-39.9) WITH COMORBIDITY (HCC): ICD-10-CM

## 2019-06-20 DIAGNOSIS — Z00.00 PHYSICAL EXAM: Primary | ICD-10-CM

## 2019-06-20 DIAGNOSIS — K21.9 GASTROESOPHAGEAL REFLUX DISEASE WITHOUT ESOPHAGITIS: ICD-10-CM

## 2019-06-20 DIAGNOSIS — Z23 ENCOUNTER FOR IMMUNIZATION: ICD-10-CM

## 2019-06-20 DIAGNOSIS — J44.9 ASTHMA WITH COPD (HCC): ICD-10-CM

## 2019-06-20 DIAGNOSIS — G47.00 INSOMNIA, UNSPECIFIED TYPE: ICD-10-CM

## 2019-06-20 RX ORDER — DIAZEPAM 10 MG/1
10 TABLET ORAL
Qty: 60 TAB | Refills: 3 | OUTPATIENT
Start: 2019-06-20 | End: 2022-01-27 | Stop reason: ALTCHOICE

## 2019-06-20 NOTE — PROGRESS NOTES
59 y.o. WHITE OR  female who presents for RPE    No cardiovascular complaints. 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 Not checking sugars at this time. Weight is going back up as noted below. She is not sleeping much at night and snacking a lot, overeating, not exercising much mainly due to motivational issues and also her back. She was not able to do intermittent fasting and has failed multiple regimens as below. Vitals 6/20/2019 10/9/2018 9/7/2018 4/23/2018   Weight 222 lb 208 lb 6.4 oz 210 lb 201 lb     She saw Dr. Niurka Holley for her back who suggested epidurals but the specialist she was referred to does not take her insurance so she is looking around right now. No gi or gu complaints. She continues to have insomnia issues not interested in Belsomra due to price, nor doxepin due to potential for weight gain.   Valium works well and requesting a refill, she is well aware of abuse potential    Past Medical History:   Diagnosis Date    Allergic rhinitis     Dr. Kandi Garrido on immunotherapy    Asthma     FEV1/FVC 64%, preFEV1 51%, postFEV1 57%, %, DLCO 96% (2/15 - Dr Jenny Blizzard)    Bellatrix[facility]iu Bronchiectasis [facility])     since childhood, idiopathic; CF negx2, A1AT neg, IgE nl, aspergillus nAb neg, sputum AFB neg, PPD neg    Chronic obstructive pulmonary disease ([facility] [address]     Colon adenoma 03/2012    Dr. Rosemarie Cheema 3/12; 10/13/17 polyp    Depression 04/2015    PHQ-9 was 11/27, DANNY-7 was 5/21; did well on lexapro    Dyslipidemia     calculated 10 year risk score was 2.5% (4/15), 3.9% (10/15); 5% (11/16); 5/9% (6/19)    GERD (gastroesophageal reflux disease)     Hypertension     IGT (impaired glucose tolerance)     Insomnia     no response to trazodone, ambien, restoril    Meralgia paresthetica of left side 7/31/2017    Morbid obesity with BMI of 40.0-44.9, adult ([facility])     peak weight 231 lbs, bmi 42.2 from 7/13; qsymia trial 4/14; declined med supervised wt loss, bariatrics w Dr Courtney Price; IF 4/18    Pseudomonas respiratory infection     2015 Dr Sweta Sloan    Sleep apnea, obstructive 10/12    Dr Jose Armando Barriga; AHI 15, RDI 15, lowest sat 84%, CPAP 15CWP; now seeing Dr Violet Linda     Past Surgical History:   Procedure Laterality Date   [address] UNLIST  2/15    echo nl lv, ef 55%, nl wma, mild mr [facility])    CARDIAC SURG PROCEDURE UNLIST  2/15    holter negative    CHEST SURGERY PROCEDURE UNLISTED  03/2017    lung biopsy [facility]    EGD  05/94    HX CATARACT REMOVAL  2017    Dr Trixie Mckeon 3/12 adenoma; 10/13/17 polyp    HX HEENT      Sinus Surgery X2; Dr Candy Nolan 3/17    HX KNEE ARTHROSCOPY Left 6/14    meniscus surgery Dr Anish Yadav  07/06    s/p JOSE Lobectomy Dr. 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   Social Needs    Financial resource strain: Not on file    Food insecurity:     Worry: Not on file     Inability: Not on file   Slanissue needs:     Medical: Not on file     Non-medical: Not on file   Tobacco Use    Smoking status: Never Smoker    Smokeless tobacco: Never Used   Substance and Sexual Activity    Alcohol use: No    Drug use: No    Sexual activity: Not on file   Lifestyle    Physical activity:     Days per week: Not on file     Minutes per session: Not on file    Stress: Not on file   Relationships    Social connections:     Talks on phone: Not on file     Gets together: Not on file     Attends Mosque service: Not on file     Active member of club or organization: Not on file     Attends meetings of clubs or organizations: Not on file     Relationship status: Not on file    Intimate partner violence:     Fear of current or ex partner: Not on file     Emotionally abused: Not on file     Physically abused: Not on file     Forced sexual activity: Not on file   Other Topics Concern    Not on file   Social History Narrative    Not on file     Family History   Problem Relation Age of Onset    Hypertension Mother    [facility] Elevated Lipids Mother     Cancer Father     Colon Polyps Father     Arthritis-osteo Father     Crohn's Disease Sister     Migraines Brother      Immunization History   Administered Date(s) Administered    (RETIRED) Pneumococcal Vaccine (Unspecified Type) 12/15/2006    Influenza Vaccine 10/01/2013, 10/01/2014, 10/01/2015, 10/01/2016, 09/15/2017    Pneumococcal Polysaccharide (PPSV-23) 10/30/2014, 06/20/2019    Tdap 03/29/2017     Current Outpatient Medications   Medication Sig    diazePAM (VALIUM) 10 mg tablet Take 1 Tab by mouth every twelve (12) hours as needed for Anxiety. Max Daily Amount: 20 mg.    fluticasone (FLONASE) 50 mcg/actuation nasal spray 2 Sprays by Both Nostrils route daily.  amLODIPine (NORVASC) 10 mg tablet Take 1 Tab by mouth daily.  esomeprazole (NEXIUM) 40 mg capsule Take 1 Cap by mouth daily.  cholecalciferol, vitamin D3, (VITAMIN D3) 2,000 unit tab Take  by mouth.  roflumilast (DALIRESP) tab tablet Take 500 mcg by mouth daily.  budesonide-formoterol (SYMBICORT) 160-4.5 mcg/actuation HFA inhaler Take 2 Puffs by inhalation two (2) times a day.  albuterol (PROVENTIL VENTOLIN) 2.5 mg /3 mL (0.083 %) nebulizer solution 2.5 mg.    albuterol (PROVENTIL HFA, VENTOLIN HFA, PROAIR HFA) 90 mcg/actuation inhaler 2 Puffs.  tiotropium (SPIRIVA WITH HANDIHALER) 18 mcg inhalation capsule Take 1 Cap by inhalation daily.     cpap machine kit by Does Not Apply route. CPAP at setting of 15 CWP with ramp of 20 minutes with humidifier. Mask: Mirage Quattro FX small size. Length of need 99 months. Replace mask and accessories as needed times 12 months. Download data at 45 days and fax at [phone]. No current facility-administered medications for this visit. Allergies   Allergen Reactions    Accolate [Zafirlukast] Other (comments)     Headache?  Lisinopril Cough    Pollen Extracts Cough     Other reaction(s): wheezing/sob    Soma [Carisoprodol] Other (comments)     headache     REVIEW OF SYSTEMS: gyn Ches 2017, mammo 3/19, colo 10/17 Dr Jean Puente, colo 12/17  Ophtho  no vision change or eye pain  Oral  no mouth pain, tongue or tooth problems  Ears  no hearing loss, ear pain, fullness, no swallowing problems  Cardiac  no CP, PND, orthopnea, palpitations or syncope  Chest  no breast masses  GI  no heartburn, nausea, vomiting, change in bowel habits, bleeding  Urinary  no dysuria, hematuria, flank pain, urgency, frequency    Visit Vitals  /70   Pulse 70   Temp 99.4 °F (37.4 °C) (Oral)   Resp 14   Ht 5' 2\" (1.575 m)   Wt 222 lb (100.7 kg)   SpO2 94%   BMI 40.60 kg/m²     A&O x3  Affect is appropriate. Mood stable  No apparent distress  Anicteric, no JVD, adenopathy or thyromegaly. No carotid bruits or radiated murmur  Lungs clear to auscultation although diminished breast sounds, no wheezes or rales  Heart showed regular rate and rhythm. No murmur, rubs, gallops  Abdomen soft nontender, no hepatosplenomegaly or masses. Extremities with 1+ edema edema.   Pulses 1-2+ symmetrically    LABS  From 6/10 showed   gluc 83, cr 0.80, gfr>60,  alt 33,        chol 206, tg 157, hdl 49, ldl-c 126, wbc 8.2, hb 14.9, plt 275, tsh 0.77, vit d 31, b12 690  From 7/13 showed   gluc 99, cr 0.60, gfr>60,  alt 30,         chol 207, tg 159, hdl 51, ldl-c 124, wbc 7.4, hb 14.3, plt 239, ua neg,   From 3/14 showed                  hba1c 5.7, chol 202, tg 154, hdl 50, ldl-c 121, wbc 9.4, hb 14.1, plt 246  From 3/15 showed   gluc 93, cr 0.55, gfr>60,  alt<5,         wbc 8.6, hb 15.1, plt 277, tsh 1.00  From 4/15 showed   gluc 97, cr 0.77, gfr>60,   hba1c 5.7, chol 205, tg 162, hdl 48, ldl-c 125, wbc 7.1, hb 14.2, plt 244  From 10/15 showed gluc 97, cr 0.70, gfr>60,   hba1c 5.7, chol 226, tg 223, hdl 46, ldl-c 135  From 11/16 showed gluc 97, cr 0.54, gfr 103, alt 16, hba1c 5.5, chol 190, tg 130, hdl 49, ldl-c 115, wbc 6.4, hb 14.7, plt 246, hep c neg    Results for orders placed or performed during the hospital encounter of 06/14/19   CBC WITH AUTOMATED DIFF   Result Value Ref Range    WBC 5.9 4.6 - 13.2 K/uL    RBC 5.04 4.20 - 5.30 M/uL    HGB 14.6 12.0 - 16.0 g/dL    HCT 42.5 35.0 - 45.0 %    MCV 84.3 74.0 - 97.0 FL    MCH 29.0 24.0 - 34.0 PG    MCHC 34.4 31.0 - 37.0 g/dL    RDW 13.1 11.6 - 14.5 %    PLATELET 787 560 - 404 K/uL    MPV 10.2 9.2 - 11.8 FL    NEUTROPHILS 57 40 - 73 %    LYMPHOCYTES 31 21 - 52 %    MONOCYTES 8 3 - 10 %    EOSINOPHILS 3 0 - 5 %    BASOPHILS 1 0 - 2 %    ABS. NEUTROPHILS 3.4 1.8 - 8.0 K/UL    ABS. LYMPHOCYTES 1.8 0.9 - 3.6 K/UL    ABS. MONOCYTES 0.5 0.05 - 1.2 K/UL    ABS. EOSINOPHILS 0.2 0.0 - 0.4 K/UL    ABS. BASOPHILS 0.1 0.0 - 0.1 K/UL    DF AUTOMATED     METABOLIC PANEL, COMPREHENSIVE   Result Value Ref Range    Sodium 142 136 - 145 mmol/L    Potassium 4.0 3.5 - 5.5 mmol/L    Chloride 110 (H) 100 - 108 mmol/L    CO2 25 21 - 32 mmol/L    Anion gap 7 3.0 - 18 mmol/L    Glucose 95 74 - 99 mg/dL    BUN 17 7.0 - 18 MG/DL    Creatinine 0.65 0.6 - 1.3 MG/DL    BUN/Creatinine ratio 26 (H) 12 - 20      GFR est AA >60 >60 ml/min/1.73m2    GFR est non-AA >60 >60 ml/min/1.73m2    Calcium 8.6 8.5 - 10.1 MG/DL    Bilirubin, total 0.4 0.2 - 1.0 MG/DL    ALT (SGPT) 25 13 - 56 U/L    AST (SGOT) 14 (L) 15 - 37 U/L    Alk.  phosphatase 103 45 - 117 U/L    Protein, total 6.7 6.4 - 8.2 g/dL    Albumin 3.6 3.4 - 5.0 g/dL    Globulin 3.1 2.0 - 4.0 g/dL    A-G Ratio 1.2 0.8 - 1.7     LIPID PANEL   Result Value Ref Range    LIPID PROFILE          Cholesterol, total 211 (H) <200 MG/DL    Triglyceride 192 (H) <150 MG/DL    HDL Cholesterol 53 40 - 60 MG/DL    LDL, calculated 119.6 (H) 0 - 100 MG/DL    VLDL, calculated 38.4 MG/DL    CHOL/HDL Ratio 4.0 0 - 5.0     TSH 3RD GENERATION   Result Value Ref Range    TSH 0.97 0.36 - 3.74 uIU/mL     Calculated 10 year risk score was 5.9%    Patient Active Problem List   Diagnosis Code    Dyslipidemia E78.5    Bronchiectasis Dr Ezekiel Ingram J47.9    Sleep apnea, obstructive Dr. Sally Pereira G47.33    Asthma with COPD ([facility] [address] J44.9    Primary hypertension I10    Gastroesophageal reflux disease without esophagitis K21.9    IGT (impaired glucose tolerance) R73.02    Chronic insomnia F51.04    Severe obesity (BMI 35.0-39. 9) with comorbidity ([facility] [address] E66.01     Assessment and plan:  1. GERD. Continue current regimen. 2. HTN. Continue current  3. Obesity. Declined med supervised wt loss, bariatrics. Lifestyle and dietary measures. Portion control reiterated. 4. Dyslipidemia. Lifestyle and dietary measures, check labs  5. Chronic insomnia. Refilled the valium  6. JESUS. F/U Dr Sally Pereira  7. Asthma and COPD, bronchiectasis. Continue current, f/u Dr Ezekiel Ingram  8. Pvx 23 given  9. Spine. She will work on that        RTC 6/20    Above conditions discussed at length and patient vocalized understanding.   All questions answered to patient satifaction

## 2019-06-20 NOTE — PROGRESS NOTES
Pippa Anderson is a 59 y.o. female who presents for routine immunizations. Per verbal order from [address]. Pneumovax 23 given in left deltoid. She denies any symptoms , reactions or allergies that would exclude them from being immunized today. Risks and adverse reactions were discussed and the VIS was given to them. All questions were addressed. She was observed for 15 min post injection. There were no reactions observed.     Samantha So LPN

## 2019-06-20 NOTE — PATIENT INSTRUCTIONS
Vaccine Information Statement Pneumococcal Polysaccharide Vaccine: What You Need to Know Many Vaccine Information Statements are available in Greek and other languages. See www.immunize.org/vis. Hojas de información Sobre Vacunas están disponibles en español y en muchos otros idiomas. Visite Slava.si. 1. Why get vaccinated? Vaccination can protect older adults (and some children and younger adults) from pneumococcal disease. Pneumococcal disease is caused by bacteria that can spread from person to person through close contact. It can cause ear infections, and it can also lead to more serious infections of the: 
 Lungs (pneumonia),  Blood (bacteremia), and 
 Covering of the brain and spinal cord (meningitis). Meningitis can cause deafness and brain damage, and it can be fatal.   
 
Anyone can get pneumococcal disease, but children under 3years of age, people with certain medical conditions, adults over 72years of age, and cigarette smokers are at the highest risk. About 18,000 older adults die each year from pneumococcal disease in the [place]. Treatment of pneumococcal infections with penicillin and other drugs used to be more effective. But some strains of the disease have become resistant to these drugs. This makes prevention of the disease, through vaccination, even more important. 2. Pneumococcal polysaccharide vaccine (PPSV23) Pneumococcal polysaccharide vaccine (PPSV23) protects against 23 types of pneumococcal bacteria. It will not prevent all pneumococcal disease. PPSV23 is recommended for:  All adults 72years of age and older,  Anyone 2 through 59years of age with certain long-term health problems, 
 Anyone 2 through 59years of age with a weakened immune system, 
 Adults 23 through 59years of age who smoke cigarettes or have asthma. Most people need only one dose of PPSV.   A second dose is recommended for certain high-risk groups. People 72 and older should get a dose even if they have gotten one or more doses of the vaccine before they turned 65. Your healthcare provider can give you more information about these recommendations. Most healthy adults develop protection within 2 to 3 weeks of getting the shot. 3. Some people should not get this vaccine  Anyone who has had a life-threatening allergic reaction to PPSV should not get another dose.  Anyone who has a severe allergy to any component of PPSV should not receive it. Tell your provider if you have any severe allergies.  Anyone who is moderately or severely ill when the shot is scheduled may be asked to wait until they recover before getting the vaccine. Someone with a mild illness can usually be vaccinated.  Children less than 3years of age should not receive this vaccine.  There is no evidence that PPSV is harmful to either a pregnant woman or to her fetus. However, as a precaution, women who need the vaccine should be vaccinated before becoming pregnant, if possible. 4. Risks of a vaccine reaction With any medicine, including vaccines, there is a chance of side effects. These are usually mild and go away on their own, but serious reactions are also possible. About half of people who get PPSV have mild side effects, such as redness or pain where the shot is given, which go away within about two days. Less than 1 out of 100 people develop a fever, muscle aches, or more severe local reactions. Problems that could happen after any vaccine:  People sometimes faint after a medical procedure, including vaccination. Sitting or lying down for about 15 minutes can help prevent fainting, and injuries caused by a fall. Tell your doctor if you feel dizzy, or have vision changes or ringing in the ears.  
 
 Some people get severe pain in the shoulder and have difficulty moving the arm where a shot was given. This happens very rarely.  Any medication can cause a severe allergic reaction. Such reactions from a vaccine are very rare, estimated at about 1 in a million doses, and would happen within a few minutes to a few hours after the vaccination. As with any medicine, there is a very remote chance of a vaccine causing a serious injury or death. The safety of vaccines is always being monitored. For more information, visit: www.cdc.gov/vaccinesafety/  
 
5. What if there is a serious reaction? What should I look for? Look for anything that concerns you, such as signs of a severe allergic reaction, very high fever, or unusual behavior. Signs of a severe allergic reaction can include hives, swelling of the face and throat, difficulty breathing, a fast heartbeat, dizziness, and weakness. These would usually start a few minutes to a few hours after the vaccination. What should I do? If you think it is a severe allergic reaction or other emergency that cant wait, call 9-1-1 or get to the nearest hospital. Otherwise, call your doctor. Afterward, the reaction should be reported to the Vaccine Adverse Event Reporting System (VAERS). Your doctor might file this report, or you can do it yourself through the VAERS web site at www.vaers. hhs.gov, or by calling 8-511.420.5493. VAERS does not give medical advice. 6. How can I learn more?  Ask your doctor. He or she can give you the vaccine package insert or suggest other sources of information.  Call your local or state health department.  Contact the Centers for Disease Control and Prevention (CDC): 
- Call 1-226.755.3049 (1-800-CDC-INFO) or 
- Visit CDCs website at www.cdc.gov/vaccines Vaccine Information Statement PPSV  
04/24/2015 Department of [facility] [employer] Centers for Disease Control and Prevention Office Use Only

## 2019-06-20 NOTE — PROGRESS NOTES
Rachid Larsen presents today for   Chief Complaint   Patient presents with    Physical     labs    Sleep Problem     been going on a while but has worsen              Depression Screening:  3 most recent PHQ Screens 6/20/2019   Little interest or pleasure in doing things Not at all   Feeling down, depressed, irritable, or hopeless Not at all   Total Score PHQ 2 0       Learning Assessment:  Learning Assessment 4/30/2014   PRIMARY LEARNER Patient   HIGHEST LEVEL OF EDUCATION - PRIMARY LEARNER  2 YEARS [facility] PRIMARY LEARNER NONE   CO-LEARNER CAREGIVER No   PRIMARY LANGUAGE ENGLISH   LEARNER PREFERENCE PRIMARY DEMONSTRATION     LISTENING   ANSWERED BY patient   RELATIONSHIP SELF       Abuse Screening:  Abuse Screening Questionnaire 6/20/2019   Do you ever feel afraid of your partner? N   Are you in a relationship with someone who physically or mentally threatens you? N   Is it safe for you to go home? Y       Fall Risk  No flowsheet data found. Coordination of Care:  1. Have you been to the ER, urgent care clinic since your last visit? Hospitalized since your last visit? no    2. Have you seen or consulted any other health care providers outside of the [address] [place] [address] since your last visit? Include any pap smears or colon screening.  no      Per verbal order med list updated

## 2019-06-20 NOTE — ACP (ADVANCE CARE PLANNING)
Advance Directive:  1. Do you have an advance directive in place? Patient Reply:yes    2. If not, would you like material regarding how to put one in place?  Patient Reply: no

## 2019-06-24 ENCOUNTER — TELEPHONE (OUTPATIENT)
Dept: INTERNAL MEDICINE CLINIC | Age: 64
End: 2019-06-24

## 2019-06-24 NOTE — TELEPHONE ENCOUNTER
Pt calling says RD was giving her rx for valium last week. Store says they didn't receive.  Please call into [facility]

## 2019-08-02 DIAGNOSIS — I10 PRIMARY HYPERTENSION: ICD-10-CM

## 2019-08-02 RX ORDER — AMLODIPINE BESYLATE 10 MG/1
10 TABLET ORAL DAILY
Qty: 90 TAB | Refills: 3 | Status: SHIPPED | OUTPATIENT
Start: 2019-08-02 | End: 2020-07-23

## 2019-08-02 NOTE — TELEPHONE ENCOUNTER
Last Visit: 06/20/2019 with MD Cornelia Nissen  Next Appointment: 06/24/2020 with MD Cornelia Nissen  Previous Refill Encounter(s): 08/02/2018 per MD Cornelia Nissen #[id number] [id number]    Requested Prescriptions     Pending Prescriptions Disp Refills    amLODIPine (NORVASC) 10 mg tablet 90 Tab 3     Sig: Take 1 Tab by mouth daily.

## 2019-08-08 RX ORDER — ESOMEPRAZOLE MAGNESIUM 40 MG/1
40 CAPSULE, DELAYED RELEASE ORAL DAILY
Qty: 90 CAP | Refills: 3 | Status: SHIPPED | OUTPATIENT
Start: 2019-08-08 | End: 2020-12-18

## 2019-08-08 NOTE — TELEPHONE ENCOUNTER
Last Visit: 6/20/19 with MD Keith Vigil  Next Appointment: 6/24/20 with MD Keith Vigil  Previous Refill Encounter(s): 8/2/18 #90 with 3 refills    Requested Prescriptions     Pending Prescriptions Disp Refills    esomeprazole (NEXIUM) 40 mg capsule 90 Cap 3     Sig: Take 1 Cap by mouth daily.

## 2020-05-29 ENCOUNTER — TELEPHONE (OUTPATIENT)
Dept: INTERNAL MEDICINE CLINIC | Age: 65
End: 2020-05-29

## 2020-05-29 DIAGNOSIS — M05.79 RHEUMATOID ARTHRITIS INVOLVING MULTIPLE SITES WITH POSITIVE RHEUMATOID FACTOR (HCC): Primary | ICD-10-CM

## 2020-05-29 DIAGNOSIS — R73.02 IGT (IMPAIRED GLUCOSE TOLERANCE): ICD-10-CM

## 2020-05-29 DIAGNOSIS — Z00.00 PHYSICAL EXAM: ICD-10-CM

## 2020-05-29 DIAGNOSIS — I10 PRIMARY HYPERTENSION: ICD-10-CM

## 2020-05-29 NOTE — TELEPHONE ENCOUNTER
Pt calling, says she is again having the problem that she can't lift her arm. Says it has happened a few times before and [facility]. Advised RD out of office today. She stated she didn't want to wait til Monday so she will return to an urgent care location to get appt today.

## 2020-06-02 ENCOUNTER — VIRTUAL VISIT (OUTPATIENT)
Dept: INTERNAL MEDICINE CLINIC | Age: 65
End: 2020-06-02

## 2020-06-02 DIAGNOSIS — M25.50 POLYARTHRALGIA: Primary | ICD-10-CM

## 2020-06-02 DIAGNOSIS — K21.9 GASTROESOPHAGEAL REFLUX DISEASE WITHOUT ESOPHAGITIS: ICD-10-CM

## 2020-06-02 DIAGNOSIS — R73.02 IGT (IMPAIRED GLUCOSE TOLERANCE): ICD-10-CM

## 2020-06-02 DIAGNOSIS — I10 PRIMARY HYPERTENSION: ICD-10-CM

## 2020-06-02 DIAGNOSIS — J44.9 ASTHMA WITH COPD (HCC): ICD-10-CM

## 2020-06-02 DIAGNOSIS — E78.5 DYSLIPIDEMIA: ICD-10-CM

## 2020-06-02 DIAGNOSIS — G47.33 OSA (OBSTRUCTIVE SLEEP APNEA): ICD-10-CM

## 2020-06-02 NOTE — PROGRESS NOTES
Maulik Kimball is a 62 y.o. female who was seen by synchronous (real-time) audio-video technology on 6/2/2020. Consent: Maulik Kimball, who was seen by synchronous (real-time) audio-video technology, and/or her healthcare decision maker, is aware that this patient-initiated, Telehealth encounter on 6/2/2020 is a billable service, with coverage as determined by her insurance carrier. She is aware that she may receive a bill and has provided verbal consent to proceed: Yes. Assessment & Plan:   Diagnoses and all orders for this visit:    1. Polyarthralgia  -     TSH 3RD GENERATION; Future  -     T4, FREE; Future  -     VITAMIN D, 25 HYDROXY; Future  -     ANTINUCLEAR ANTIBODIES, IFA; Future  -     RHEUMASSURE; Future  -     SED RATE (ESR); Future  -     C REACTIVE PROTEIN, QT; Future  -     URIC ACID; Future  -     HEP B SURFACE AG; Future  -     URINALYSIS W/ RFLX MICROSCOPIC; Future  -     REFERRAL TO RHEUMATOLOGY  -     PARVOVIRUS B19 AB, IGM; Future    2. Primary hypertension  -     CBC W/O DIFF; Future  -     URINALYSIS W/ RFLX MICROSCOPIC; Future    3. Gastroesophageal reflux disease without esophagitis    4. IGT (impaired glucose tolerance)  -     METABOLIC PANEL, COMPREHENSIVE; Future  -     HEMOGLOBIN A1C W/O EAG; Future    5. JESUS (obstructive sleep apnea)    6. Asthma with COPD ([facility] [address]    7. Dyslipidemia  -     LIPID PANEL; Future        I spent at least 20 minutes on this visit with this established patient. 712  Subjective:   Maulik Kimball is a 62 y.o. female who was seen for No chief complaint on file. Objective: There were no vitals taken for this visit.    General: alert, cooperative, no distress   Mental  status: normal mood, behavior, speech, dress, motor activity, and thought processes, able to follow commands   HENT: NCAT   Neck: no visualized mass   Resp: no respiratory distress   Neuro: no gross deficits   Skin: no discoloration or lesions of concern on visible areas   Psychiatric: normal affect, consistent with stated mood, no evidence of hallucinations     Additional exam findings: We discussed the expected course, resolution and complications of the diagnosis(es) in detail. Medication risks, benefits, costs, interactions, and alternatives were discussed as indicated. I advised her to contact the office if her condition worsens, changes or fails to improve as anticipated. She expressed understanding with the diagnosis(es) and plan. David Servin is a 62 y.o. female who was evaluated by a video visit encounter for concerns as above. Patient identification was verified prior to start of the visit. A caregiver was present when appropriate. Due to this being a TeleHealth encounter (During WSWTS-77 public health emergency), evaluation of the following organ systems was limited: Vitals/Constitutional/EENT/Resp/CV/GI//MS/Neuro/Skin/Heme-Lymph-Imm. Pursuant to the emergency declaration under the [facility] [place] [id number] waiver authority and the [employer] Act, this Virtual  Visit was conducted, with patient's (and/or legal guardian's) consent, to reduce the patient's risk of exposure to COVID-19 and provide necessary medical care. Services were provided through a video synchronous discussion virtually to substitute for in-person clinic visit. Patient and provider were located at their individual homes. Kylie Thompson MD      62 y.o. WHITE OR  female who presents for evaluation    For the last month or so, she's been having migratory joint pain and swelling. Started out in the left hand wherein the knuckles got swollen, stiff and tender w warmth and dec rom. [facility] and [facility] were negative. She was told she had 'tenosynovitis' and was given a round of prednisone. On 5/16, she had similar manifestation in the right index finger.   On 5/23 she had onset of sx in the left foot and finally on the 28th, she had it in the left shoulder involvement wherein she could barely abduct due to the pain and swelling. She's been using nsaids but ended up taking some leftover prednisone from her prior pulm flareups. No butterfly rash although a few months ago, she had a strange rash in the knuckles which cleared up. No fever or other constitutional complaints, eye sx, dry mouth or mucosal complaints, pleurisy, gi or gu sx, raynaud's complaints, fhx rheumatologic disease outside of PMR in her mom. Prior hip xrays 8/17 neg for arthritis. No h/o hepatitis or known tick bites.     Past Medical History:   Diagnosis Date    Allergic rhinitis     Dr. Edward Castillo on immunotherapy    Asthma     FEV1/FVC 64%, preFEV1 51%, postFEV1 57%, %, DLCO 96% (2/15 - Dr Jessica Emery)    [facility] Bronchiectasis [facility])     since childhood, idiopathic; CF negx2, A1AT neg, IgE nl, aspergillus nAb neg, sputum AFB neg, PPD neg    Chronic obstructive pulmonary disease ([facility] [address]     Colon adenoma 03/2012    Dr. Mina Landry 3/12; 10/13/17 polyp    Depression 04/2015    PHQ-9 was 11/27, DANNY-7 was 5/21; did well on lexapro    Dyslipidemia     calculated 10 year risk score was 2.5% (4/15), 3.9% (10/15); 5% (11/16); 5/9% (6/19)    GERD (gastroesophageal reflux disease) 05/1994    on egd    Hypertension     IGT (impaired glucose tolerance)     Insomnia     no response to trazodone, ambien, restoril    Meralgia paresthetica of left side 7/31/2017    Morbid obesity with BMI of 40.0-44.9, adult ([facility])     peak weight 231 lbs, bmi 42.2 from 7/13; qsymia trial 4/14; declined med supervised wt loss, bariatrics w Dr Angy Jacobson; IF 4/18    Pseudomonas respiratory infection     2015 Dr Manuel Espino    Sleep apnea, obstructive 10/12    Dr Seth Aleman; AHI 15, RDI 15, lowest sat 84%, CPAP 15CWP; now seeing Dr Toledo Search     Past Surgical History:   Procedure Laterality Date   [facility] PROCEDURE UNLIST  2/15    echo nl lv, ef 55%, nl wma, mild mr [facility])    CARDIAC SURG PROCEDURE UNLIST  2/15    holter negative    CHEST SURGERY PROCEDURE UNLISTED  03/2017    lung biopsy [facility]    HX CATARACT REMOVAL  2017    Dr Reno Elizalde 3/12 adenoma; 10/13/17 polyp    HX HEENT  03/2017    Sinus Surgery X2; Dr Yahir Granger KNEE ARTHROSCOPY  06/2014    Dr Carlton Loza meniscectomy    HX TONSILLECTOMY      GA THORACOSCOPY W/LOBECTOMY SINGLE LOBE  07/06    s/p JOSE Lobectomy Dr. 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   Social Needs    Financial resource strain: Not on file    Food insecurity     Worry: Not on file     Inability: Not on file   [employer] needs     Medical: Not on file     Non-medical: Not on file   Tobacco Use    Smoking status: Never Smoker    Smokeless tobacco: Never Used   Substance and Sexual Activity    Alcohol use: No    Drug use: No    Sexual activity: Not on file   Lifestyle    Physical activity     Days per week: Not on file     Minutes per session: Not on file    Stress: Not on file   Relationships    Social connections     Talks on phone: Not on file     Gets together: Not on file     Attends [employer] service: Not on file     Active member of club or organization: Not on file     Attends meetings of clubs or organizations: Not on file     Relationship status: Not on file    Intimate partner violence     Fear of current or ex partner: Not on file     Emotionally abused: Not on file     Physically abused: Not on file     Forced sexual activity: Not on file   Other Topics Concern    Not on file   Social History Narrative    Not on file     Family History   Problem Relation Age of Onset    Hypertension Mother     Elevated Lipids Mother     Cancer Father     Colon Polyps Father     Arthritis-osteo Father     Crohn's Disease Sister     Migraines Brother      Immunization History   Administered Date(s) Administered    (RETIRED) Pneumococcal Vaccine (Unspecified Type) 12/15/2006    Influenza Vaccine 10/01/2013, 10/01/2014, 10/01/2015, 10/01/2016, 09/15/2017    Pneumococcal Polysaccharide (PPSV-23) 10/30/2014, 06/20/2019    Tdap 03/29/2017     Current Outpatient Medications   Medication Sig    esomeprazole (NEXIUM) 40 mg capsule Take 1 Cap by mouth daily.  amLODIPine (NORVASC) 10 mg tablet Take 1 Tab by mouth daily.  diazePAM (VALIUM) 10 mg tablet Take 1 Tab by mouth every twelve (12) hours as needed for Anxiety. Max Daily Amount: 20 mg.    fluticasone (FLONASE) 50 mcg/actuation nasal spray 2 Sprays by Both Nostrils route daily.  cholecalciferol, vitamin D3, (VITAMIN D3) 2,000 unit tab Take  by mouth.  roflumilast (DALIRESP) tab tablet Take 500 mcg by mouth daily.  budesonide-formoterol (SYMBICORT) 160-4.5 mcg/actuation HFA inhaler Take 2 Puffs by inhalation two (2) times a day.  albuterol (PROVENTIL VENTOLIN) 2.5 mg /3 mL (0.083 %) nebulizer solution 2.5 mg.    albuterol (PROVENTIL HFA, VENTOLIN HFA, PROAIR HFA) 90 mcg/actuation inhaler 2 Puffs.  tiotropium (SPIRIVA WITH HANDIHALER) 18 mcg inhalation capsule Take 1 Cap by inhalation daily.  cpap machine kit by Does Not Apply route. CPAP at setting of 15 CWP with ramp of 20 minutes with humidifier. Mask: Mirage Quattro FX small size. Length of need 99 months. Replace mask and accessories as needed times 12 months. Download data at 45 days and fax at [phone]. No current facility-administered medications for this visit. Allergies   Allergen Reactions    Accolate [Zafirlukast] Other (comments)     Headache?     Lisinopril Cough    Pollen Extracts Cough     Other reaction(s): wheezing/sob    Soma [Carisoprodol] Other (comments)     headache     REVIEW OF SYSTEMS: gyn Ches 2017, mammo 3/19, colo 10/17 Dr Pro Almodovar, colo 12/17  Ophtho  no vision change or eye pain  Oral  no mouth pain, tongue or tooth problems  Ears  no hearing loss, ear pain, fullness, no swallowing problems  Cardiac  no CP, PND, orthopnea, palpitations or syncope  Chest  no breast masses  GI  no heartburn, nausea, vomiting, change in bowel habits, bleeding  Urinary  no dysuria, hematuria, flank pain, urgency, frequency    LABS  From 6/10 showed   gluc 83, cr 0.80, gfr>60,  alt 33,        chol 206, tg 157, hdl 49, ldl-c 126, wbc 8.2, hb 14.9, plt 275, tsh 0.77, vit d 31, b12 690  From 7/13 showed   gluc 99, cr 0.60, gfr>60,  alt 30,         chol 207, tg 159, hdl 51, ldl-c 124, wbc 7.4, hb 14.3, plt 239, ua neg,   From 3/14 showed                  hba1c 5.7, chol 202, tg 154, hdl 50, ldl-c 121, wbc 9.4, hb 14.1, plt 246  From 3/15 showed   gluc 93, cr 0.55, gfr>60,  alt<5,         wbc 8.6, hb 15.1, plt 277, tsh 1.00  From 4/15 showed   gluc 97, cr 0.77, gfr>60,   hba1c 5.7, chol 205, tg 162, hdl 48, ldl-c 125, wbc 7.1, hb 14.2, plt 244  From 10/15 showed gluc 97, cr 0.70, gfr>60,   hba1c 5.7, chol 226, tg 223, hdl 46, ldl-c 135  From 11/16 showed gluc 97, cr 0.54, gfr 103, alt 16, hba1c 5.5, chol 190, tg 130, hdl 49, ldl-c 115, wbc 6.4, hb 14.7, plt 246,            hep c neg  From 6/19 showed   gluc 95, cr 0.65, gfr>60, alt 25,         chol 211, tg 192, hdl 53, ldl-c 120, wbc 5.9, hb 14.6, plt 244, tsh 0.97    Patient Active Problem List   Diagnosis Code    Dyslipidemia E78.5    Bronchiectasis Dr Eddie Rivers J47.9    JESUS (obstructive sleep apnea) G47.33    Asthma with COPD ([facility] J44.9    Primary hypertension I10    Gastroesophageal reflux disease without esophagitis K21.9    IGT (impaired glucose tolerance) R73.02    Chronic insomnia F51.04    Severe obesity (BMI 35.0-39. 9) with comorbidity ([facility] E66.01     Assessment and plan: 1. GERD. Continue current regimen. 2. HTN. Continue current  3. Obesity. Declined med supervised wt loss, bariatrics. Lifestyle and dietary measures. Portion control reiterated. 4. Dyslipidemia. Lifestyle and dietary measures, check labs  5. Chronic insomnia. Prn bdz  6. JESUS. F/U Dr Jb Bartlett  7. Asthma and COPD, bronchiectasis. Continue current, f/u Dr Renetta Suarez  8. Polyarthralgia. Long discussion. Will try to get her in w rheum asap. Check screening labs also, defer imaging to them        RTC 6/20    Above conditions discussed at length and patient vocalized understanding.   All questions answered to patient satifaction

## 2020-06-05 ENCOUNTER — DOCUMENTATION ONLY (OUTPATIENT)
Dept: INTERNAL MEDICINE CLINIC | Age: 65
End: 2020-06-05

## 2020-06-05 LAB
A-G RATIO,AGRAT: 1.3 RATIO (ref 1.1–2.6)
ALBUMIN SERPL-MCNC: 4 G/DL (ref 3.5–5)
ALP SERPL-CCNC: 88 U/L (ref 40–120)
ALT SERPL-CCNC: 17 U/L (ref 5–40)
AMORPHOUS CRYSTALS,AMORC: PRESENT
ANION GAP SERPL CALC-SCNC: 16.6 MMOL/L
AST SERPL W P-5'-P-CCNC: 14 U/L (ref 10–37)
BILIRUB SERPL-MCNC: 0.5 MG/DL (ref 0.2–1.2)
BILIRUB UR QL: NEGATIVE
BUN SERPL-MCNC: 23 MG/DL (ref 6–22)
C-REACTIVE PROTEIN, QT, 006627: 2.8 MG/DL (ref 0–0.5)
CALCIUM SERPL-MCNC: 9.3 MG/DL (ref 8.4–10.5)
CHLORIDE SERPL-SCNC: 103 MMOL/L (ref 98–110)
CO2 SERPL-SCNC: 24 MMOL/L (ref 20–32)
CREAT SERPL-MCNC: 0.6 MG/DL (ref 0.8–1.4)
ERYTHROCYTE [DISTWIDTH] IN BLOOD BY AUTOMATED COUNT: 13 % (ref 10–15.5)
GFRAA, 66117: >60
GFRNA, 66118: >60
GLOBULIN,GLOB: 3.2 G/DL (ref 2–4)
GLUCOSE SERPL-MCNC: 75 MG/DL (ref 70–99)
GLUCOSE UR QL: NEGATIVE MG/DL
HCT VFR BLD AUTO: 44.3 % (ref 35.1–48)
HGB BLD-MCNC: 15.3 G/DL (ref 11.7–16)
HGB UR QL STRIP: NEGATIVE
KETONES UR QL STRIP.AUTO: NEGATIVE MG/DL
LEUKOCYTE ESTERASE: ABNORMAL
MCH RBC QN AUTO: 29 PG (ref 26–34)
MCHC RBC AUTO-ENTMCNC: 35 G/DL (ref 31–36)
MCV RBC AUTO: 84 FL (ref 81–99)
NITRITE UR QL STRIP.AUTO: NEGATIVE
PH UR STRIP: 5.5 PH (ref 5–8)
PLATELET # BLD AUTO: 312 K/UL (ref 140–440)
PMV BLD AUTO: 9.5 FL (ref 9–13)
POTASSIUM SERPL-SCNC: 3.8 MMOL/L (ref 3.5–5.5)
PROT SERPL-MCNC: 7.2 G/DL (ref 6.2–8.1)
PROT UR QL STRIP: NEGATIVE MG/DL
RBC # BLD AUTO: 5.27 M/UL (ref 3.8–5.2)
RBC #/AREA URNS HPF: NEGATIVE /HPF
SED RATE (ESR): 12 MM/HR (ref 0–30)
SODIUM SERPL-SCNC: 144 MMOL/L (ref 133–145)
SP GR UR: >=1.03 (ref 1–1.03)
TSH SERPL DL<=0.005 MIU/L-ACNC: 0.84 MCU/ML (ref 0.27–4.2)
URATE SERPL-MCNC: 4.1 MG/DL (ref 2.2–7.7)
UROBILINOGEN UR STRIP-MCNC: 0.2 MG/DL
WBC # BLD AUTO: 13.6 K/UL (ref 4–11)
WBC URNS QL MICRO: ABNORMAL /HPF (ref 0–2)

## 2020-06-06 LAB
25(OH)D3 SERPL-MCNC: 34.6 NG/ML (ref 32–100)
AVG GLU, 10930: 107 MG/DL (ref 91–123)
CHOLEST SERPL-MCNC: 225 MG/DL (ref 110–200)
HBA1C MFR BLD HPLC: 5.3 % (ref 4.8–5.6)
HDLC SERPL-MCNC: 4.4 MG/DL (ref 0–5)
HDLC SERPL-MCNC: 51 MG/DL
HEP B SURFACE AG SCRN, 006510: NORMAL
LDL/HDL RATIO,LDHD: 2.9
LDLC SERPL CALC-MCNC: 148 MG/DL (ref 50–99)
NON-HDL CHOLESTEROL, 011976: 174 MG/DL
T4 FREE SERPL-MCNC: 1.4 NG/DL (ref 0.9–1.8)
TRIGL SERPL-MCNC: 129 MG/DL (ref 40–149)
VLDLC SERPL CALC-MCNC: 26 MG/DL (ref 8–30)

## 2020-06-08 ENCOUNTER — TELEPHONE (OUTPATIENT)
Dept: INTERNAL MEDICINE CLINIC | Age: 65
End: 2020-06-08

## 2020-06-08 DIAGNOSIS — M25.50 ARTHRALGIA, UNSPECIFIED JOINT: Primary | ICD-10-CM

## 2020-06-08 LAB
ANA SCREEN, 8017: NEGATIVE
B19V IGG SER IA-ACNC: 6.1 INDEX (ref 0–0.8)
Lab: 0.6 INDEX (ref 0–0.8)

## 2020-06-08 RX ORDER — PREDNISONE 10 MG/1
TABLET ORAL
Qty: 42 TAB | Refills: 0 | Status: SHIPPED | OUTPATIENT
Start: 2020-06-08 | End: 2020-12-14 | Stop reason: ALTCHOICE

## 2020-06-08 NOTE — TELEPHONE ENCOUNTER
Referral was faxed 6/2/20. Cannot fax note from [facility] as the nurses do not see this information. Referral refaxed to fax number provided below.

## 2020-06-08 NOTE — TELEPHONE ENCOUNTER
Pt calling, says she needs referral to go to Dr. Reinaldo Hernandez [address] [facility]. 15 .  Fax [phone] is supposed to send last labs, xray and notes from [facility]    Seeing them for joint pain

## 2020-06-08 NOTE — TELEPHONE ENCOUNTER
Patient called stating Dr Courtney Caro office never received a fax for a referral could you please refax it to - [phone]

## 2020-06-08 NOTE — TELEPHONE ENCOUNTER
Patient stating she is still having inflammation. She had some Prednisone left over that she has been taking. She has had another flare up. Wants to know if you can prescribe anything.

## 2020-06-08 NOTE — TELEPHONE ENCOUNTER
Contacted 's office they confirmed they did receive referral and was scanned into patients chart this morning.

## 2020-06-10 LAB
14.3.3 ETA, RHEUM. ARTHRITIS: <0.2 NG/ML
CCP ANTIBODIES IGG/IGA, 164915: 122 UNITS
RHEUMATOID FACT SERPL-ACNC: 15.8 UNITS/ML

## 2020-06-11 ENCOUNTER — TELEPHONE (OUTPATIENT)
Dept: INTERNAL MEDICINE CLINIC | Age: 65
End: 2020-06-11

## 2020-06-11 PROBLEM — M05.79 RHEUMATOID ARTHRITIS INVOLVING MULTIPLE SITES WITH POSITIVE RHEUMATOID FACTOR (HCC): Status: ACTIVE | Noted: 2020-06-11

## 2020-06-11 NOTE — TELEPHONE ENCOUNTER
[facility] call    Results for orders placed or performed in visit on 05/29/20   C REACTIVE PROTEIN, QT   Result Value Ref Range    C-Reactive Protein, Qt 2.8 (H) 0.0 - 0.5 mg/dL   RHEUMASSURE   Result Value Ref Range    14. 3.3 ETA, Rheum. Arthritis <0.20 ng/mL    Rheumatoid Arthritis Factor 15.8 (H) Units/mL    CCP Antibodies IgG/IgA 122 (H) Units   KIRTI BY MULTIPLEX FLOW IA, QL   Result Value Ref Range    KIRTI SCREEN Negative Negative   LIPID PANEL   Result Value Ref Range    Triglyceride 129 40 - 149 mg/dL    HDL Cholesterol 51 >=40 mg/dL    Cholesterol, total 225 (H) 110 - 200 mg/dL    CHOLESTEROL/HDL 4.4 0.0 - 5.0    Non-HDL Cholesterol 174 (H) <130 mg/dL    LDL, calculated 148 (H) 50 - 99 mg/dL    VLDL, calculated 26 8 - 30 mg/dL    LDL/HDL Ratio 2.9    VITAMIN D, 25 HYDROXY   Result Value Ref Range    VITAMIN D, 25-HYDROXY 34.6 32.0 - 100.0 ng/mL   PARVOVIRUS B19 AB   Result Value Ref Range    Parvovirus B19 Ab, IgG 6.1 (H) 0.0 - 0.8 index    Parvovirus B19 Ab, IgM 0.6 0.0 - 0.8 index   T4, FREE   Result Value Ref Range    T4, Free 1.4 0.9 - 1.8 ng/dL   SED RATE (ESR)   Result Value Ref Range    Sed rate (ESR) 12 0 - 30 mm/hr   HEMOGLOBIN A1C W/O EAG   Result Value Ref Range    Hemoglobin A1c 5.3 4.8 - 5.6 %    AVG  91 - 123 mg/dL   CBC W/O DIFF   Result Value Ref Range    WBC 13.6 (H) 4.0 - 11.0 K/uL    RBC 5.27 (H) 3.80 - 5.20 M/uL    HGB 15.3 11.7 - 16.0 g/dL    HCT 44.3 35.1 - 48.0 %    MCV 84 81 - 99 fL    MCH 29 26 - 34 pg    MCHC 35 31 - 36 g/dL    RDW 13.0 10.0 - 15.5 %    PLATELET 062 406 - 605 K/uL    MPV 9.5 9.0 - 43.8 fL   METABOLIC PANEL, COMPREHENSIVE   Result Value Ref Range    Glucose 75 70 - 99 mg/dL    BUN 23 (H) 6 - 22 mg/dL    Creatinine 0.6 (L) 0.8 - 1.4 mg/dL    Sodium 144 133 - 145 mmol/L    Potassium 3.8 3.5 - 5.5 mmol/L    Chloride 103 98 - 110 mmol/L    CO2 24 20 - 32 mmol/L    AST (SGOT) 14 10 - 37 U/L    ALT (SGPT) 17 5 - 40 U/L    Alk.  phosphatase 88 40 - 120 U/L    Bilirubin, total 0.5 0.2 - 1.2 mg/dL    Calcium 9.3 8.4 - 10.5 mg/dL    Protein, total 7.2 6.2 - 8.1 g/dL    Albumin 4.0 3.5 - 5.0 g/dL    A-G Ratio 1.3 1.1 - 2.6 ratio    Globulin 3.2 2.0 - 4.0 g/dL    Anion gap 16.6 mmol/L    GFRAA >60.0 >60.0    GFRNA >60.0 >60.0   TSH 3RD GENERATION   Result Value Ref Range    TSH 0.84 0.27 - 4.20 mcU/mL   URINALYSIS W/MICROSCOPIC   Result Value Ref Range    Specific Gravity >=1.030 1.005 - 1.03    pH (UA) 5.5 5.0 - 8.0 pH    Protein Negative Negative, mg/dL    Glucose Negative Negative mg/dL    Ketone Negative Negative, mg/dL    Bilirubin Negative Negative    Blood Negative Negative    Nitrites Negative Negative    Leukocyte Esterase Trace (A) Negative    Urobilinogen 0.2 <2.0 mg/dL    WBC 0-2 0 - 2 /hpf    RBC Negative Negative, /hpf    Amorphous Crystals present    HEP B SURFACE AG   Result Value Ref Range    Hep B surface Ag screen None Detected None Detec   URIC ACID   Result Value Ref Range    Uric acid 4.1 2.2 - 7.7 mg/dL     Labs consistent with rheum arthritis  Chemistries ok  a1c ok  Chol up but will recheck once the RA is under control and hold off meds for now  When is appt with rheumatology dr [facility]? If in July, can we expedite for newly dx'ed RA?

## 2020-06-11 NOTE — TELEPHONE ENCOUNTER
Patient has a few more questions. she is heading to [place] and wants to know about diet, exercise with her diagnosis. Please call.

## 2020-06-12 NOTE — TELEPHONE ENCOUNTER
Spoke w pt at 840am    Discussed her dx of RA. We recently called in a round of pred taper  She was going to [place] to visit with her ill mom but she's cancelled that. She also spoke w Dr Byron Shoemaker office and they moved up her appt to 6/30/20  She asked about predisposing factors for RA and told her I don't believe that anything predisposed her to this but she will talk to Dr Rachael Lubin and get more insight. We discussed her chol readings and how she now qualifies for statin tx. My preference is to hold off until then RA issues are straightened out regarding med choices with known sfx profile of statins potentially causing liver and muscle issues which would be difficult to differentiate from the RA findings should they show up. Will recheck in 6 mos and go from there.   She is planning on instituting better lifestyle and dietary choices, etc    F/U in December

## 2020-09-17 DIAGNOSIS — I10 PRIMARY HYPERTENSION: ICD-10-CM

## 2020-09-20 RX ORDER — AMLODIPINE BESYLATE 10 MG/1
TABLET ORAL
Qty: 30 TAB | Refills: 1 | Status: SHIPPED | OUTPATIENT
Start: 2020-09-20 | End: 2020-11-16

## 2020-11-14 DIAGNOSIS — I10 PRIMARY HYPERTENSION: ICD-10-CM

## 2020-11-16 RX ORDER — AMLODIPINE BESYLATE 10 MG/1
TABLET ORAL
Qty: 30 TAB | Refills: 1 | Status: SHIPPED | OUTPATIENT
Start: 2020-11-16 | End: 2021-01-12

## 2020-12-07 NOTE — PROGRESS NOTES
60 y.o. WHITE OR  female who presents for evaluation. Since the dx of RA, she has been under tx by Dr Harish Araujo and doing well on MTX, currently off prednisone. Pain is controlled and the fatigue is better as well    No cardiovascular complaints. She retired from the hospital.  Not much exercise    The asthma is controlled on regimen below    No gi or gu complaints, gerd controlled    She has been seeing Dr Doyle Brady for the l spine spondylosis    Denies polyuria, polydipsia, nocturia, vision change. Not checking sugars at this time.       Past Medical History:   Diagnosis Date    Allergic rhinitis     Dr. Divine Medina on immunotherapy    Asthma     FEV1/FVC 64%, preFEV1 51%, postFEV1 57%, %, DLCO 96% (2/15 - Dr Lynette Johnson)    [address] [place] [address] Bronchiectasis [facility])     since childhood, idiopathic; CF negx2, A1AT neg, IgE nl, aspergillus nAb neg, sputum AFB neg, PPD neg    Chronic obstructive pulmonary disease ([facility]     Colon adenoma 03/2012    Dr. Faiza West 3/12; 10/13/17 polyp    Depression 04/2015    PHQ-9 was 11/27, DANNY-7 was 5/21; did well on lexapro    Dyslipidemia     calculated 10 year risk score was 2.5% (4/15), 3.9% (10/15); 5% (11/16); 5/9% (6/19)    GERD (gastroesophageal reflux disease) 05/1994    on egd    Hypertension     IGT (impaired glucose tolerance)     Insomnia     no response to trazodone, ambien, restoril    Meralgia paresthetica of left side 7/31/2017    Morbid obesity with BMI of 40.0-44.9, adult ([facility])     peak weight 231 lbs, bmi 42.2 from 7/13; qsymia trial 4/14; declined med supervised wt loss, bariatrics w Dr Aristeo Wayne; IF 4/18    JESUS on CPAP 10/2012    Dr Madiha Blakely; AHI 15, RDI 15, lowest sat 84%, CPAP 15CWP; now seeing Dr Kimmie Yadav Pseudomonas respiratory infection 2015    Dr Marcel Gupta    Rheumatoid arthritis involving multiple sites with positive rheumatoid factor ([facility] 06/11/2020    Dr Harish Araujo; ccp/ra+; MTX     Past Surgical History:   Procedure Laterality Date   [address] [facility] CARDIAC SURG PROCEDURE UNLIST  2/15    echo nl lv, ef 55%, nl wma, mild mr [facility])    CARDIAC SURG PROCEDURE UNLIST  2/15    holter negative    CHEST SURGERY PROCEDURE UNLISTED  03/2017    lung biopsy [facility]    HX CATARACT REMOVAL  2017    Dr Faizan Sawyer 3/12 adenoma; 10/13/17 polyp    HX HEENT  03/2017    Sinus Surgery X2; Dr Dharmesh Mohan HX KNEE ARTHROSCOPY  06/2014    Dr Esa Martinez  07/06    s/p JOSE Lobectomy Dr. Chuck Araujo 2006     Current Outpatient Medications   Medication Sig    amLODIPine (NORVASC) 10 mg tablet TAKE 1 TABLET BY MOUTH EVERY DAY    predniSONE (DELTASONE) 10 mg tablet Take 6 tabs for 2 days, then 5 tabs for 2 days, then 4 tabs for 2 days, then 3 tabs for 2 days, then 2 tabs for 2 days then 1 tab for 2 days then stop    esomeprazole (NEXIUM) 40 mg capsule Take 1 Cap by mouth daily.  diazePAM (VALIUM) 10 mg tablet Take 1 Tab by mouth every twelve (12) hours as needed for Anxiety. Max Daily Amount: 20 mg.    fluticasone (FLONASE) 50 mcg/actuation nasal spray 2 Sprays by Both Nostrils route daily.  cholecalciferol, vitamin D3, (VITAMIN D3) 2,000 unit tab Take  by mouth.  roflumilast (DALIRESP) tab tablet Take 500 mcg by mouth daily.  budesonide-formoterol (SYMBICORT) 160-4.5 mcg/actuation HFA inhaler Take 2 Puffs by inhalation two (2) times a day.  albuterol (PROVENTIL VENTOLIN) 2.5 mg /3 mL (0.083 %) nebulizer solution 2.5 mg.    albuterol (PROVENTIL HFA, VENTOLIN HFA, PROAIR HFA) 90 mcg/actuation inhaler 2 Puffs.  tiotropium (SPIRIVA WITH HANDIHALER) 18 mcg inhalation capsule Take 1 Cap by inhalation daily.  cpap machine kit by Does Not Apply route. CPAP at setting of 15 CWP with ramp of 20 minutes with humidifier. Mask: Mirage Quattro FX small size. Length of need 99 months. Replace mask and accessories as needed times 12 months.   Download data at 45 days and fax at [phone]. No current facility-administered medications for this visit. Allergies   Allergen Reactions    Accolate [Zafirlukast] Other (comments)     Headache?  Lisinopril Cough    Pollen Extracts Cough     Other reaction(s): wheezing/sob    Soma [Carisoprodol] Other (comments)     headache     REVIEW OF SYSTEMS: gyn Ches 2017, mammo 3/19, colo 10/17 Dr Leroy Lund, colo 12/17  Ophtho  no vision change or eye pain  Oral  no mouth pain, tongue or tooth problems  Ears  no hearing loss, ear pain, fullness, no swallowing problems  Cardiac  no CP, PND, orthopnea, palpitations or syncope  Chest  no breast masses  GI  no heartburn, nausea, vomiting, change in bowel habits, bleeding  Urinary  no dysuria, hematuria, flank pain, urgency, frequency    Visit Vitals  /71   Pulse 81   Temp 96.8 °F (36 °C) (Temporal)   Resp 14   Ht 5' 2\" (1.575 m)   Wt 222 lb (100.7 kg)   SpO2 97%   BMI 40.60 kg/m²   A&O x3  Affect is appropriate. Mood stable  No apparent distress  Anicteric, no JVD, adenopathy or thyromegaly. No carotid bruits or radiated murmur  Lungs clear to auscultation, no wheezes or rales  Heart showed regular rate and rhythm. No murmur, rubs, gallops  Abdomen soft nontender, no hepatosplenomegaly or masses. Extremities without edema.   Pulses 1-2+ symmetrically    LABS  From 6/10 showed   gluc 83, cr 0.80, gfr>60,  alt 33,        chol 206, tg 157, hdl 49, ldl-c 126, wbc 8.2, hb 14.9, plt 275, tsh 0.77, vit d 31, b12 690  From 7/13 showed   gluc 99, cr 0.60, gfr>60,  alt 30,         chol 207, tg 159, hdl 51, ldl-c 124, wbc 7.4, hb 14.3, plt 239, ua neg,   From 3/14 showed                  hba1c 5.7, chol 202, tg 154, hdl 50, ldl-c 121, wbc 9.4, hb 14.1, plt 246  From 3/15 showed   gluc 93, cr 0.55, gfr>60,  alt<5,         wbc 8.6, hb 15.1, plt 277, tsh 1.00  From 4/15 showed   gluc 97, cr 0.77, gfr>60,   hba1c 5.7, chol 205, tg 162, hdl 48, ldl-c 125, wbc 7.1, hb 14.2, plt 244  From 10/15 showed gluc 97, cr 0.70, gfr>60,   hba1c 5.7, chol 226, tg 223, hdl 46, ldl-c 135  From 11/16 showed gluc 97, cr 0.54, gfr 103, alt 16, hba1c 5.5, chol 190, tg 130, hdl 49, ldl-c 115, wbc 6.4, hb 14.7, plt 246,            hep c neg  From 6/19 showed   gluc 95, cr 0.65, gfr>60,  alt 25,         chol 211, tg 192, hdl 53, ldl-c 120, wbc 5.9, hb 14.6, plt 244, tsh 0.97  From 6/20 showed   gluc 75, cr 0.60, gfr>60,  alt 13, hba1c 5.3, chol 225, tg 129, hdl 51, ldl-c 148, wb 13.6, hb 15.3, plt 312, tsh 0.84, ft4 1.40, vit d 34.6, RA+, CCP+, carissa neg, parvo neg, hep b/c neg, crp 2.8, uric 4.1    Patient Active Problem List   Diagnosis Code    Dyslipidemia E78.5    Bronchiectasis Dr Llamas Menard J47.9    JESUS (obstructive sleep apnea) G47.33    Asthma with COPD ([facility] J44.9    Primary hypertension I10    Gastroesophageal reflux disease without esophagitis K21.9    IGT (impaired glucose tolerance) R73.02    Chronic insomnia F51.04    Severe obesity (BMI 35.0-39. 9) with comorbidity (HCC) E66.01    Rheumatoid arthritis involving multiple sites with positive rheumatoid factor (HCC) M05.79     Assessment and plan:  1. GERD. Continue current regimen. 2. HTN. Continue current  3. Obesity. Declined med supervised wt loss, bariatrics. Lifestyle and dietary measures. Portion control reiterated. 4. Dyslipidemia. Lifestyle and dietary measures, check labs next visit  5. Chronic insomnia. Prn bdz  6. JESUS. F/U Dr Aide Robin  7. Asthma and COPD, bronchiectasis. Continue current, f/u Dr Muriel Pelayo  8. RA. Continue current regimen and f/u Dr Salinas Form        RTC 6/21    Above conditions discussed at length and patient vocalized understanding.   All questions answered to patient satifaction

## 2020-12-14 ENCOUNTER — OFFICE VISIT (OUTPATIENT)
Dept: INTERNAL MEDICINE CLINIC | Age: 65
End: 2020-12-14
Payer: COMMERCIAL

## 2020-12-14 VITALS
HEART RATE: 81 BPM | SYSTOLIC BLOOD PRESSURE: 126 MMHG | HEIGHT: 62 IN | BODY MASS INDEX: 40.85 KG/M2 | RESPIRATION RATE: 14 BRPM | OXYGEN SATURATION: 97 % | WEIGHT: 222 LBS | TEMPERATURE: 96.8 F | DIASTOLIC BLOOD PRESSURE: 71 MMHG

## 2020-12-14 DIAGNOSIS — G47.33 OSA (OBSTRUCTIVE SLEEP APNEA): ICD-10-CM

## 2020-12-14 DIAGNOSIS — M05.79 RHEUMATOID ARTHRITIS INVOLVING MULTIPLE SITES WITH POSITIVE RHEUMATOID FACTOR (HCC): ICD-10-CM

## 2020-12-14 DIAGNOSIS — E66.01 SEVERE OBESITY (BMI 35.0-39.9) WITH COMORBIDITY (HCC): ICD-10-CM

## 2020-12-14 DIAGNOSIS — E78.5 DYSLIPIDEMIA: ICD-10-CM

## 2020-12-14 DIAGNOSIS — J44.9 ASTHMA WITH COPD (HCC): ICD-10-CM

## 2020-12-14 DIAGNOSIS — K21.9 GASTROESOPHAGEAL REFLUX DISEASE WITHOUT ESOPHAGITIS: ICD-10-CM

## 2020-12-14 DIAGNOSIS — R73.02 IGT (IMPAIRED GLUCOSE TOLERANCE): ICD-10-CM

## 2020-12-14 DIAGNOSIS — I10 PRIMARY HYPERTENSION: Primary | ICD-10-CM

## 2020-12-14 DIAGNOSIS — J47.9 BRONCHIECTASIS WITHOUT COMPLICATION (HCC): ICD-10-CM

## 2020-12-14 PROCEDURE — 99214 OFFICE O/P EST MOD 30 MIN: CPT | Performed by: INTERNAL MEDICINE

## 2020-12-14 RX ORDER — FLUTICASONE FUROATE AND VILANTEROL TRIFENATATE 100; 25 UG/1; UG/1
POWDER RESPIRATORY (INHALATION)
COMMUNITY
Start: 2020-09-15 | End: 2022-01-27 | Stop reason: ALTCHOICE

## 2020-12-14 RX ORDER — FLUTICASONE PROPIONATE 93 UG/1
SPRAY, METERED NASAL 2 TIMES DAILY
COMMUNITY
End: 2020-12-18

## 2020-12-14 RX ORDER — METHOTREXATE 2.5 MG/1
25 TABLET ORAL
COMMUNITY
Start: 2020-12-07

## 2020-12-14 RX ORDER — PREDNISONE 5 MG/1
5 TABLET ORAL DAILY
COMMUNITY

## 2020-12-14 RX ORDER — FOLIC ACID 1 MG/1
1 TABLET ORAL DAILY
COMMUNITY
Start: 2020-10-05

## 2021-01-21 ENCOUNTER — TELEPHONE (OUTPATIENT)
Dept: INTERNAL MEDICINE CLINIC | Age: 66
End: 2021-01-21

## 2021-01-21 DIAGNOSIS — M05.79 RHEUMATOID ARTHRITIS INVOLVING MULTIPLE SITES WITH POSITIVE RHEUMATOID FACTOR (HCC): ICD-10-CM

## 2021-01-21 DIAGNOSIS — R73.02 IGT (IMPAIRED GLUCOSE TOLERANCE): Primary | ICD-10-CM

## 2021-01-21 DIAGNOSIS — E66.01 SEVERE OBESITY (BMI 35.0-39.9) WITH COMORBIDITY (HCC): ICD-10-CM

## 2021-01-21 DIAGNOSIS — E78.5 DYSLIPIDEMIA: ICD-10-CM

## 2021-01-21 NOTE — TELEPHONE ENCOUNTER
Not sure that there's something to be offered for that from nutrition point of view    That said, I'll send the referral

## 2021-02-08 LAB — CREATININE, EXTERNAL: 0.6

## 2021-02-24 ENCOUNTER — TELEPHONE (OUTPATIENT)
Dept: INTERNAL MEDICINE CLINIC | Age: 66
End: 2021-02-24

## 2021-02-24 NOTE — TELEPHONE ENCOUNTER
Pt calling asking for appt to see RD. Says she has a sinus infection. She says she has chronic sinus issues. No fever pain under her eyes. She called her specialist she normally sees Dr. Amirah Samuels but he can't get her in until May. Wants to know if RD will see her for this? Will you see in office?

## 2021-05-03 ENCOUNTER — TELEPHONE (OUTPATIENT)
Dept: INTERNAL MEDICINE CLINIC | Age: 66
End: 2021-05-03

## 2021-05-03 NOTE — TELEPHONE ENCOUNTER
Patient needs to be cleared for her surgery that's on June the 16th.  Is it ok if I move her 6 mo f/u up 2 weeks and combine it with the pre op physical?

## 2021-05-04 NOTE — TELEPHONE ENCOUNTER
Patient's insurance has denied her surgery so they are working on getting that straightened out. She has requested to keep everything as is for now and she will call us back if she needs to change anything.

## 2021-06-08 ENCOUNTER — APPOINTMENT (OUTPATIENT)
Dept: INTERNAL MEDICINE CLINIC | Age: 66
End: 2021-06-08

## 2021-06-08 ENCOUNTER — HOSPITAL ENCOUNTER (OUTPATIENT)
Dept: LAB | Age: 66
Discharge: HOME | End: 2021-06-08
Payer: COMMERCIAL

## 2021-06-08 DIAGNOSIS — I10 PRIMARY HYPERTENSION: ICD-10-CM

## 2021-06-08 LAB
ALBUMIN SERPL-MCNC: 3.6 G/DL (ref 3.4–5)
ALBUMIN/GLOB SERPL: 1.2 {RATIO} (ref 0.8–1.7)
ALP SERPL-CCNC: 82 U/L (ref 45–117)
ALT SERPL-CCNC: 22 U/L (ref 13–56)
ANION GAP SERPL CALC-SCNC: 4 MMOL/L (ref 3–18)
AST SERPL-CCNC: 11 U/L (ref 10–38)
BILIRUB SERPL-MCNC: 0.5 MG/DL (ref 0.2–1)
BUN SERPL-MCNC: 19 MG/DL (ref 7–18)
BUN/CREAT SERPL: 32 (ref 12–20)
CALCIUM SERPL-MCNC: 9.2 MG/DL (ref 8.5–10.1)
CHLORIDE SERPL-SCNC: 108 MMOL/L (ref 100–111)
CHOLEST SERPL-MCNC: 228 MG/DL
CO2 SERPL-SCNC: 29 MMOL/L (ref 21–32)
CREAT SERPL-MCNC: 0.6 MG/DL (ref 0.6–1.3)
ERYTHROCYTE [DISTWIDTH] IN BLOOD BY AUTOMATED COUNT: 13.3 % (ref 11.6–14.5)
GLOBULIN SER CALC-MCNC: 3 G/DL (ref 2–4)
GLUCOSE SERPL-MCNC: 90 MG/DL (ref 74–99)
HBA1C MFR BLD: 5.2 % (ref 4.2–5.6)
HCT VFR BLD AUTO: 42.1 % (ref 35–45)
HDLC SERPL-MCNC: 51 MG/DL (ref 40–60)
HDLC SERPL: 4.5 {RATIO} (ref 0–5)
HGB BLD-MCNC: 14.2 G/DL (ref 12–16)
LDLC SERPL CALC-MCNC: 132.2 MG/DL (ref 0–100)
LIPID PROFILE,FLP: ABNORMAL
MCH RBC QN AUTO: 29.4 PG (ref 24–34)
MCHC RBC AUTO-ENTMCNC: 33.7 G/DL (ref 31–37)
MCV RBC AUTO: 87.2 FL (ref 74–97)
PLATELET # BLD AUTO: 277 K/UL (ref 135–420)
PMV BLD AUTO: 9.4 FL (ref 9.2–11.8)
POTASSIUM SERPL-SCNC: 4 MMOL/L (ref 3.5–5.5)
PROT SERPL-MCNC: 6.6 G/DL (ref 6.4–8.2)
RBC # BLD AUTO: 4.83 M/UL (ref 4.2–5.3)
SODIUM SERPL-SCNC: 141 MMOL/L (ref 136–145)
TRIGL SERPL-MCNC: 224 MG/DL (ref ?–150)
VLDLC SERPL CALC-MCNC: 44.8 MG/DL
WBC # BLD AUTO: 8.4 K/UL (ref 4.6–13.2)

## 2021-06-08 PROCEDURE — 80061 LIPID PANEL: CPT

## 2021-06-08 PROCEDURE — 80053 COMPREHEN METABOLIC PANEL: CPT

## 2021-06-08 PROCEDURE — 83036 HEMOGLOBIN GLYCOSYLATED A1C: CPT

## 2021-06-08 PROCEDURE — 85027 COMPLETE CBC AUTOMATED: CPT

## 2021-06-08 PROCEDURE — 36415 COLL VENOUS BLD VENIPUNCTURE: CPT

## 2021-06-09 NOTE — PROGRESS NOTES
60 y.o. WHITE female who presents for evaluation. Since the dx of RA, she has been under tx by Dr Ayo Collins and doing well on MTX, currently off prednisone. She is 'disillusioned about the whole thing'. Frustrated by her inability to tolerate much activity as 'everything flares the joints'. Her insurance 'doesn't cover anything'    No cardiovascular complaints. No exercise, just adls, she uses a rollator when she has to walk long distances    The asthma is controlled on regimen below. She sees Dr Louise Matos    No gi or gu complaints, gerd controlled    Denies polyuria, polydipsia, nocturia, vision change. Not checking sugars at this time.   Unable to lose weight    Vitals 6/15/2021 12/14/2020 6/20/2019 10/9/2018   Weight 225 lb 222 lb 222 lb 208 lb 6.4 oz     Past Medical History:   Diagnosis Date    Allergic rhinitis     Dr. Kimberly Araujo on immunotherapy    Asthma     FEV1/FVC 64%, preFEV1 51%, postFEV1 57%, %, DLCO 96% (2/15 - Dr Louise Matos)    [facility] Bronchiectasis [facility])     since childhood, idiopathic; CF negx2, A1AT neg, IgE nl, aspergillus nAb neg, sputum AFB neg, PPD neg    Chronic obstructive pulmonary disease ([facility]     Colon adenoma 03/2012    Dr. Leland Guallpa 3/12; 10/13/17 polyp    Depression 04/2015    PHQ-9 was 11/27, DANNY-7 was 5/21; did well on lexapro    Dyslipidemia     calculated 10 year risk score was 2.5% (4/15), 3.9% (10/15); 5% (11/16); 5.9% (6/19); 6.8% (6/21)    GERD (gastroesophageal reflux disease) 05/1994    on egd    Hypertension     IGT (impaired glucose tolerance)     Insomnia     no response to trazodone, ambien, restoril    Meralgia paresthetica of left side 7/31/2017    Morbid obesity with BMI of 40.0-44.9, adult ([facility])     peak weight 231 lbs, bmi 42.2 from 7/13; qsymia trial 4/14; declined med supervised wt loss, bariatrics w Dr Zeferino Dillon; IF 4/18    JESUS on CPAP 10/2012    Dr Darren Lester; AHI 15, RDI 15, lowest sat 84%, CPAP 15CWP; now seeing Dr Marilyn Mann Pseudomonas respiratory infection 2015    Dr Marleny Regalado    Rheumatoid arthritis ([facility] 06/11/2020    Dr Karena Renee; ccp/ra+; MTX     Past Surgical History:   Procedure Laterality Date    BRONCHOSCOPY  1987    HX CATARACT REMOVAL Bilateral 2017    Dr Felicia Duke 3/12 adenoma; 10/13/17 polyp    HX HEENT  03/2017    Sinus Surgery X2; Dr Bakari Love Left 06/2014    Dr Bin Ndiaye  2/15    echo nl lv, ef 55%, nl wma, mild mr [facility])    PA CARDIAC SURG PROCEDURE UNLIST  2/15    holter negative    PA CHEST SURGERY PROCEDURE UNLISTED  03/2017    lung biopsy [address]    PA THORACOSCOPY W/LOBECTOMY SINGLE LOBE  07/06    s/p JOSE Lobectomy Dr. Emogene Saint 2006     Social History     Socioeconomic History    Marital status:      Spouse name: Not on file    Number of children: Not on file    Years of education: Not on file    Highest education level: Not on file   Occupational History    Occupation: retired RN   Tobacco Use    Smoking status: Never Smoker    Smokeless tobacco: Never Used   Substance and Sexual Activity    Alcohol use: No    Drug use: No     Social Determinants of Health     Financial Resource Strain:     Difficulty of Paying Living Expenses:    Food Insecurity:     Worried About [address] in the Last Year:     [address] in the Last Year:    Transportation Needs:     Lack of Transportation (Medical):      Lack of Transportation (Non-Medical):    Physical Activity:     Days of Exercise per Week:     Minutes of Exercise per Session:    Stress:     Feeling of Stress :    Social Connections:     Frequency of Communication with Friends and Family:     Frequency of Social Gatherings with Friends and Family:     Attends Yazidism Services:     Active Member of Clubs or Organizations:     Attends Club or Organization Meetings:     Marital Status:      Current Outpatient Medications   Medication Sig    amLODIPine (NORVASC) 10 mg tablet TAKE 1 TABLET BY MOUTH EVERY DAY    fluticasone furoate-vilanteroL (Breo Ellipta) 100-25 mcg/dose inhaler INHALE 1 PUFF BY MOUTH ONCE A DAY FOR 30 DAYS.  folic acid (FOLVITE) 1 mg tablet Take 1 mg by mouth daily.  methotrexate (RHEUMATREX) 2.5 mg tablet Take 25 mg by mouth every seven (7) days.  predniSONE (DELTASONE) 5 mg tablet Take 5 mg by mouth daily.  cholecalciferol, vitamin D3, (VITAMIN D3) 2,000 unit tab Take  by mouth.  roflumilast (DALIRESP) tab tablet Take 500 mcg by mouth daily.  albuterol (PROVENTIL VENTOLIN) 2.5 mg /3 mL (0.083 %) nebulizer solution 2.5 mg.    albuterol (PROVENTIL HFA, VENTOLIN HFA, PROAIR HFA) 90 mcg/actuation inhaler 2 Puffs every six (6) hours as needed.  cpap machine kit by Does Not Apply route. CPAP at setting of 15 CWP with ramp of 20 minutes with humidifier. Mask: Mirage Quattro FX small size. Length of need 99 months. Replace mask and accessories as needed times 12 months. Download data at 45 days and fax at [phone].  diazePAM (VALIUM) 10 mg tablet Take 1 Tab by mouth every twelve (12) hours as needed for Anxiety. Max Daily Amount: 20 mg. (Patient not taking: Reported on 6/15/2021)     No current facility-administered medications for this visit. Allergies   Allergen Reactions    Accolate [Zafirlukast] Other (comments)     Headache?     Lisinopril Cough    Pollen Extracts Cough     Other reaction(s): wheezing/sob    Soma [Carisoprodol] Other (comments)     headache     REVIEW OF SYSTEMS: mammo 10/20, colo 10/17 Dr Yaneli Maria  Ophtho  no vision change or eye pain  Oral  no mouth pain, tongue or tooth problems  Ears  no hearing loss, ear pain, fullness, no swallowing problems  Cardiac  no CP, PND, orthopnea, palpitations or syncope  Chest  no breast masses  GI  no heartburn, nausea, vomiting, change in bowel habits, bleeding  Urinary  no dysuria, hematuria, flank pain, urgency, frequency    Visit Vitals  /74 (BP 1 Location: Left arm, BP Patient Position: Sitting, BP Cuff Size: Adult)   Pulse 76   Temp 97.3 °F (36.3 °C) (Temporal)   Resp 20   Ht 5' 2\" (1.575 m)   Wt 225 lb (102.1 kg)   SpO2 96%   BMI 41.15 kg/m²   A&O x3  Affect is appropriate. Mood stable  No apparent distress  Anicteric, no JVD, adenopathy or thyromegaly. No carotid bruits or radiated murmur  Lungs clear to auscultation, no wheezes or rales  Heart showed regular rate and rhythm. No murmur, rubs, gallops  Abdomen soft nontender, no hepatosplenomegaly or masses. Extremities without edema.   Pulses 1-2+ symmetrically    LABS  From 6/10 showed   gluc 83, cr 0.80, gfr>60,  alt 33,        chol 206, tg 157, hdl 49, ldl-c 126, wbc 8.2, hb 14.9, plt 275, tsh 0.77, vit d 31, b12 690  From 7/13 showed   gluc 99, cr 0.60, gfr>60,  alt 30,         chol 207, tg 159, hdl 51, ldl-c 124, wbc 7.4, hb 14.3, plt 239, ua neg,   From 3/14 showed                  hba1c 5.7, chol 202, tg 154, hdl 50, ldl-c 121, wbc 9.4, hb 14.1, plt 246  From 3/15 showed   gluc 93, cr 0.55, gfr>60,  alt<5,         wbc 8.6, hb 15.1, plt 277, tsh 1.00  From 4/15 showed   gluc 97, cr 0.77, gfr>60,   hba1c 5.7, chol 205, tg 162, hdl 48, ldl-c 125, wbc 7.1, hb 14.2, plt 244  From 10/15 showed gluc 97, cr 0.70, gfr>60,   hba1c 5.7, chol 226, tg 223, hdl 46, ldl-c 135  From 11/16 showed gluc 97, cr 0.54, gfr 103, alt 16, hba1c 5.5, chol 190, tg 130, hdl 49, ldl-c 115, wbc 6.4, hb 14.7, plt 246,            hep c neg  From 6/19 showed   gluc 95, cr 0.65, gfr>60,  alt 25,         chol 211, tg 192, hdl 53, ldl-c 120, wbc 5.9, hb 14.6, plt 244, tsh 0.97  From 6/20 showed   gluc 75, cr 0.60, gfr>60,  alt 13, hba1c 5.3, chol 225, tg 129, hdl 51, ldl-c 148, wb 13.6, hb 15.3, plt 312, tsh 0.84, ft4 1.40, vit d 34.6, RA+, CCP+, carissa neg, parvo neg, hep b/c neg, crp 2.8, uric 4.1    Results for orders placed or performed during the hospital encounter of 06/08/21   CBC W/O DIFF   Result Value Ref Range    WBC 8.4 4.6 - 13.2 K/uL    RBC 4.83 4.20 - 5.30 M/uL    HGB 14.2 12.0 - 16.0 g/dL    HCT 42.1 35.0 - 45.0 %    MCV 87.2 74.0 - 97.0 FL    MCH 29.4 24.0 - 34.0 PG    MCHC 33.7 31.0 - 37.0 g/dL    RDW 13.3 11.6 - 14.5 %    PLATELET 185 289 - 100 K/uL    MPV 9.4 9.2 - 11.8 FL   LIPID PANEL   Result Value Ref Range    LIPID PROFILE          Cholesterol, total 228 (H) <200 MG/DL    Triglyceride 224 (H) <150 MG/DL    HDL Cholesterol 51 40 - 60 MG/DL    LDL, calculated 132.2 (H) 0 - 100 MG/DL    VLDL, calculated 44.8 MG/DL    CHOL/HDL Ratio 4.5 0 - 5.0     METABOLIC PANEL, COMPREHENSIVE   Result Value Ref Range    Sodium 141 136 - 145 mmol/L    Potassium 4.0 3.5 - 5.5 mmol/L    Chloride 108 100 - 111 mmol/L    CO2 29 21 - 32 mmol/L    Anion gap 4 3.0 - 18 mmol/L    Glucose 90 74 - 99 mg/dL    BUN 19 (H) 7.0 - 18 MG/DL    Creatinine 0.60 0.6 - 1.3 MG/DL    BUN/Creatinine ratio 32 (H) 12 - 20      GFR est AA >60 >60 ml/min/1.73m2    GFR est non-AA >60 >60 ml/min/1.73m2    Calcium 9.2 8.5 - 10.1 MG/DL    Bilirubin, total 0.5 0.2 - 1.0 MG/DL    ALT (SGPT) 22 13 - 56 U/L    AST (SGOT) 11 10 - 38 U/L    Alk. phosphatase 82 45 - 117 U/L    Protein, total 6.6 6.4 - 8.2 g/dL    Albumin 3.6 3.4 - 5.0 g/dL    Globulin 3.0 2.0 - 4.0 g/dL    A-G Ratio 1.2 0.8 - 1.7     HEMOGLOBIN A1C W/O EAG   Result Value Ref Range    Hemoglobin A1c 5.2 4.2 - 5.6 %     We reviewed the patient's labs from the last several visits to point out trends in the numbers    Calculated 10 year risk score was 6.8%        Patient Active Problem List   Diagnosis Code    Dyslipidemia E78.5    Bronchiectasis Dr Marleny Regalado J47.9    JESUS (obstructive sleep apnea) G47.33    [facility] J44.9    Primary hypertension I10    Gastroesophageal reflux disease without esophagitis K21.9    IGT (impaired glucose tolerance) R73.02    Chronic insomnia F51.04    Severe obesity (BMI 35.0-39. 9) with comorbidity (HCC) E66.01    Rheumatoid arthritis involving multiple sites with positive rheumatoid factor (HCC) M05.79     Assessment and plan:  1. GERD. Continue current regimen. 2. HTN. Continue current  3. Obesity. Declined med supervised wt loss, bariatrics. Lifestyle and dietary measures. Portion control reiterated. 4. Dyslipidemia. Lifestyle and dietary measures. Discussed further stratification with ca score, wants to think about it  5. Chronic insomnia. Prn bdz  6. JESUS. F/U Dr Negrita Garcia  7. Asthma and COPD, bronchiectasis. Continue current, f/u Dr Hector Freeman  8. RA. Continue current regimen and f/u Dr Jerzy Mercado        RTC 12/21    Above conditions discussed at length and patient vocalized understanding.   All questions answered to patient satifaction

## 2021-06-14 NOTE — PROGRESS NOTES
Lida Martin presents today for No chief complaint on file. Follow up HTN 
6-8-21 Coordination of Care: 1. Have you been to the ER, urgent care clinic since your last visit? Hospitalized since your last visit? NO 
 
2. Have you seen or consulted any other health care providers outside of the [address] [place] [address] since your last visit? Include any pap smears or colon screening.  YES

## 2021-06-15 ENCOUNTER — OFFICE VISIT (OUTPATIENT)
Dept: INTERNAL MEDICINE CLINIC | Age: 66
End: 2021-06-15
Payer: COMMERCIAL

## 2021-06-15 VITALS
DIASTOLIC BLOOD PRESSURE: 74 MMHG | SYSTOLIC BLOOD PRESSURE: 122 MMHG | OXYGEN SATURATION: 96 % | TEMPERATURE: 97.3 F | RESPIRATION RATE: 20 BRPM | HEART RATE: 76 BPM | WEIGHT: 225 LBS | HEIGHT: 62 IN | BODY MASS INDEX: 41.41 KG/M2

## 2021-06-15 DIAGNOSIS — I10 PRIMARY HYPERTENSION: ICD-10-CM

## 2021-06-15 DIAGNOSIS — J47.9 BRONCHIECTASIS WITHOUT COMPLICATION (HCC): ICD-10-CM

## 2021-06-15 DIAGNOSIS — E78.5 DYSLIPIDEMIA: Primary | ICD-10-CM

## 2021-06-15 DIAGNOSIS — E66.01 SEVERE OBESITY (BMI 35.0-39.9) WITH COMORBIDITY (HCC): ICD-10-CM

## 2021-06-15 DIAGNOSIS — M05.79 RHEUMATOID ARTHRITIS INVOLVING MULTIPLE SITES WITH POSITIVE RHEUMATOID FACTOR (HCC): ICD-10-CM

## 2021-06-15 DIAGNOSIS — J44.9 ASTHMA WITH COPD (HCC): ICD-10-CM

## 2021-06-15 DIAGNOSIS — R73.02 IGT (IMPAIRED GLUCOSE TOLERANCE): ICD-10-CM

## 2021-06-15 PROCEDURE — 99214 OFFICE O/P EST MOD 30 MIN: CPT | Performed by: INTERNAL MEDICINE

## 2021-06-15 NOTE — PATIENT INSTRUCTIONS
High Blood Pressure: Care Instructions Overview It's normal for blood pressure to go up and down throughout the day. But if it stays up, you have high blood pressure. Another name for high blood pressure is hypertension. Despite what a lot of people think, high blood pressure usually doesn't cause headaches or make you feel dizzy or lightheaded. It usually has no symptoms. But it does increase your risk of stroke, heart attack, and other problems. You and your doctor will talk about your risks of these problems based on your blood pressure. Your doctor will give you a goal for your blood pressure. Your goal will be based on your health and your age. Lifestyle changes, such as eating healthy and being active, are always important to help lower blood pressure. You might also take medicine to reach your blood pressure goal. 
Follow-up care is a key part of your treatment and safety. Be sure to make and go to all appointments, and call your doctor if you are having problems. It's also a good idea to know your test results and keep a list of the medicines you take. How can you care for yourself at home? Medical treatment · If you stop taking your medicine, your blood pressure will go back up. You may take one or more types of medicine to lower your blood pressure. Be safe with medicines. Take your medicine exactly as prescribed. Call your doctor if you think you are having a problem with your medicine. · Talk to your doctor before you start taking aspirin every day. Aspirin can help certain people lower their risk of a heart attack or stroke. But taking aspirin isn't right for everyone, because it can cause serious bleeding. · See your doctor regularly. You may need to see the doctor more often at first or until your blood pressure comes down. · If you are taking blood pressure medicine, talk to your doctor before you take decongestants or anti-inflammatory medicine, such as ibuprofen.  Some of these medicines can raise blood pressure. · Learn how to check your blood pressure at home. Lifestyle changes · Stay at a healthy weight. This is especially important if you put on weight around the waist. Losing even 10 pounds can help you lower your blood pressure. · If your doctor recommends it, get more exercise. Walking is a good choice. Bit by bit, increase the amount you walk every day. Try for at least 30 minutes on most days of the week. You also may want to swim, bike, or do other activities. · Avoid or limit alcohol. Talk to your doctor about whether you can drink any alcohol. · Try to limit how much sodium you eat to less than 2,300 milligrams (mg) a day. Your doctor may ask you to try to eat less than 1,500 mg a day. · Eat plenty of fruits (such as bananas and oranges), vegetables, legumes, whole grains, and low-fat dairy products. · Lower the amount of saturated fat in your diet. Saturated fat is found in animal products such as milk, cheese, and meat. Limiting these foods may help you lose weight and also lower your risk for heart disease. · Do not smoke. Smoking increases your risk for heart attack and stroke. If you need help quitting, talk to your doctor about stop-smoking programs and medicines. These can increase your chances of quitting for good. When should you call for help? Call  911 anytime you think you may need emergency care. This may mean having symptoms that suggest that your blood pressure is causing a serious heart or blood vessel problem. Your blood pressure may be over 180/120. For example, call 911 if: 
  · You have symptoms of a heart attack. These may include: 
? Chest pain or pressure, or a strange feeling in the chest. 
? Sweating. ? Shortness of breath. ? Nausea or vomiting. ? Pain, pressure, or a strange feeling in the back, neck, jaw, or upper belly or in one or both shoulders or arms. ? Lightheadedness or sudden weakness.  
? A fast or irregular heartbeat.  
  · You have symptoms of a stroke. These may include: 
? Sudden numbness, tingling, weakness, or loss of movement in your face, arm, or leg, especially on only one side of your body. ? Sudden vision changes. ? Sudden trouble speaking. ? Sudden confusion or trouble understanding simple statements. ? Sudden problems with walking or balance. ? A sudden, severe headache that is different from past headaches.  
  · You have severe back or belly pain. Do not wait until your blood pressure comes down on its own. Get help right away. Call your doctor now or seek immediate care if: 
  · Your blood pressure is much higher than normal (such as 180/120 or higher), but you don't have symptoms.  
  · You think high blood pressure is causing symptoms, such as: 
? Severe headache. 
? Blurry vision. Watch closely for changes in your health, and be sure to contact your doctor if: 
  · Your blood pressure measures higher than your doctor recommends at least 2 times. That means the top number is higher or the bottom number is higher, or both.  
  · You think you may be having side effects from your blood pressure medicine. Where can you learn more? Go to http://www.gray.com/ Enter B139 in the search box to learn more about \"High Blood Pressure: Care Instructions. \" Current as of: August 31, 2020               Content Version: 12.8 © 2006-2021 [employer]. Care instructions adapted under license by [employer] (which disclaims liability or warranty for this information). If you have questions about a medical condition or this instruction, always ask your healthcare professional. [place] [address] any warranty or liability for your use of this information.

## 2021-06-15 NOTE — PROGRESS NOTES
Patient is in the office today for a 6 month follow up. Do you have an Advance Directive : no  Do you want more information : information given    1. Have you been to the ER, urgent care clinic since your last visit? Hospitalized since your last visit? Yes, [facility]. 2. Have you seen or consulted any other health care providers outside of the [address] [place] [address] since your last visit? Include any pap smears or colon screening. yes, Dr. Mar Kamara [facility].

## 2021-08-27 ENCOUNTER — TELEPHONE (OUTPATIENT)
Dept: INTERNAL MEDICINE CLINIC | Age: 66
End: 2021-08-27

## 2021-08-27 DIAGNOSIS — M05.79 RHEUMATOID ARTHRITIS INVOLVING MULTIPLE SITES WITH POSITIVE RHEUMATOID FACTOR (HCC): Primary | ICD-10-CM

## 2021-08-27 NOTE — TELEPHONE ENCOUNTER
Pt is requesting a referral to Dr Mouna Berger for treatment of her RA. Her office is closer and more convenient. She has already contacted their office and said they do accept her insurance, and her insurance will be changing soon and they will accept her new insurance.     Any questions, call her at [phone]

## 2021-10-11 LAB — SARS-COV-2, NAA: NOT DETECTED

## 2021-12-02 ENCOUNTER — TELEPHONE (OUTPATIENT)
Dept: INTERNAL MEDICINE CLINIC | Age: 66
End: 2021-12-02

## 2021-12-02 DIAGNOSIS — M05.79 RHEUMATOID ARTHRITIS INVOLVING MULTIPLE SITES WITH POSITIVE RHEUMATOID FACTOR (HCC): Primary | ICD-10-CM

## 2021-12-02 NOTE — TELEPHONE ENCOUNTER
----- Message from Giovanna Cam sent at 12/2/2021 10:34 AM EST -----  Subject: Referral Request    QUESTIONS   Reason for referral request? rheumatology   Has the physician seen you for this condition before? No   Preferred Specialist (if applicable)? Do you already have an appointment scheduled? Additional Information for Provider? fax ( [phone])   ---------------------------------------------------------------------------  --------------  Katy ROJAS  What is the best way for the office to contact you? OK to leave message on   voicemail  Preferred Call Back Phone Number?  [phone]

## 2021-12-20 ENCOUNTER — TELEPHONE (OUTPATIENT)
Dept: INTERNAL MEDICINE CLINIC | Age: 66
End: 2021-12-20

## 2021-12-20 NOTE — TELEPHONE ENCOUNTER
Fax received [address] [place] [address] does not accept patients insurance. Who else would you like to refer her to.  We can update the current referral no need to place new referral.

## 2021-12-21 NOTE — TELEPHONE ENCOUNTER
pls tell pt the case  She had been seeing dr Mario Simpson at [facility]  The only other rheum I know are in [place], [facility] or [facility]

## 2021-12-22 NOTE — TELEPHONE ENCOUNTER
Patient stated she has spoken with Dr. David Kelley office and she can set up an appointment as soon as the referral is faxed over to them.

## 2021-12-22 NOTE — TELEPHONE ENCOUNTER
[facility] on [phone] to inform patient and advise her to either have RD recommend a Rheumatologist or call number on insurance card to find a closer one of her choice and let us know who to send referral to.

## 2022-01-17 DIAGNOSIS — I10 PRIMARY HYPERTENSION: ICD-10-CM

## 2022-01-19 ENCOUNTER — APPOINTMENT (OUTPATIENT)
Dept: INTERNAL MEDICINE CLINIC | Age: 67
End: 2022-01-19

## 2022-01-19 ENCOUNTER — HOSPITAL ENCOUNTER (OUTPATIENT)
Dept: LAB | Age: 67
Discharge: HOME | End: 2022-01-19
Payer: MEDICARE

## 2022-01-19 DIAGNOSIS — R73.02 IGT (IMPAIRED GLUCOSE TOLERANCE): ICD-10-CM

## 2022-01-19 DIAGNOSIS — E78.5 DYSLIPIDEMIA: ICD-10-CM

## 2022-01-19 LAB
ANION GAP SERPL CALC-SCNC: 7 MMOL/L (ref 3–18)
BUN SERPL-MCNC: 13 MG/DL (ref 7–18)
BUN/CREAT SERPL: 20 (ref 12–20)
CALCIUM SERPL-MCNC: 9 MG/DL (ref 8.5–10.1)
CHLORIDE SERPL-SCNC: 106 MMOL/L (ref 100–111)
CHOLEST SERPL-MCNC: 211 MG/DL
CO2 SERPL-SCNC: 26 MMOL/L (ref 21–32)
CREAT SERPL-MCNC: 0.65 MG/DL (ref 0.6–1.3)
EST. AVERAGE GLUCOSE BLD GHB EST-MCNC: 105 MG/DL
GLUCOSE SERPL-MCNC: 105 MG/DL (ref 74–99)
HBA1C MFR BLD: 5.3 % (ref 4.2–5.6)
HDLC SERPL-MCNC: 60 MG/DL (ref 40–60)
HDLC SERPL: 3.5 {RATIO} (ref 0–5)
LDLC SERPL CALC-MCNC: 117.4 MG/DL (ref 0–100)
LIPID PROFILE,FLP: ABNORMAL
POTASSIUM SERPL-SCNC: 4 MMOL/L (ref 3.5–5.5)
SODIUM SERPL-SCNC: 139 MMOL/L (ref 136–145)
TRIGL SERPL-MCNC: 168 MG/DL (ref ?–150)
VLDLC SERPL CALC-MCNC: 33.6 MG/DL

## 2022-01-19 PROCEDURE — 83036 HEMOGLOBIN GLYCOSYLATED A1C: CPT

## 2022-01-19 PROCEDURE — 80061 LIPID PANEL: CPT

## 2022-01-19 PROCEDURE — 36415 COLL VENOUS BLD VENIPUNCTURE: CPT

## 2022-01-19 PROCEDURE — 80048 BASIC METABOLIC PNL TOTAL CA: CPT

## 2022-01-19 RX ORDER — AMLODIPINE BESYLATE 10 MG/1
TABLET ORAL
Qty: 30 TABLET | Refills: 11 | Status: SHIPPED | OUTPATIENT
Start: 2022-01-19

## 2022-01-21 NOTE — PROGRESS NOTES
73 y.o. WHITE/NON- female who presents for evaluation. She continues to be managed by Dr Tiffanie Garcia and is now on cimzia. Her joint sx are much improved    No cardiovascular complaints. No exercise, just adls, uses a rollator when she has to walk long distances. bp controlled when she checks    She sees Dr Maia Esparza for the asthma. Had to come off the daliresp due to changing insurance to medicare and the secondary did not cover it. Very frustrated by the whole insurance coverage issues and she 'just had to pull back' as getting very upset with all the hoops she had to go through. She had recent bronchiectasis flare as well and had to have iv abx via PICC line. Gerd controlled but she has been having some brbpr and is scheduled to see Dr Batsheva Silva in the near future; she is due for colo anyway    Denies polyuria, polydipsia, nocturia, vision change. Not checking sugars at this time.   Weight continuing to go up    Vitals 1/26/2022 6/15/2021 12/14/2020 6/20/2019 10/9/2018   Weight 231 lb 225 lb 222 lb 222 lb 208 lb 6.4 oz     Vitals 9/7/2018 4/23/2018   Weight 210 lb 201 lb     LAST MEDICARE WELLNESS EXAM: 1/26/22    Past Medical History:   Diagnosis Date    Allergic rhinitis     Dr. Chapis Kate on immunotherapy    Asthma     FEV1/FVC 64%, preFEV1 51%, postFEV1 57%, %, DLCO 96% (2/15 - Dr Maia Esparza) ; daliresp oin past dc'ed due to cost    Bronchiectasis ([facility]     since childhood, idiopathic; CF negx2, A1AT neg, IgE nl, aspergillus nAb neg, sputum AFB neg, PPD neg    Chronic obstructive pulmonary disease ([facility]     Colon adenoma 03/2012    Dr. Batsheva Silva 3/12; 10/13/17 polyp    Depression 04/2015    PHQ-9 was 11/27, DANNY-7 was 5/21; did well on lexapro    Dyslipidemia     calculated 10 year risk score was 2.5% (4/15), 3.9% (10/15); 5% (11/16); 5.9% (6/19); 6.8% (6/21); 8.3% (1/22)    GERD (gastroesophageal reflux disease) 05/1994    on egd    Hypertension     IGT (impaired glucose tolerance)     Insomnia no response to trazodone, ambien, restoril    Meralgia paresthetica of left side 7/31/2017    Morbid obesity with BMI of 40.0-44.9, adult ([facility])     peak weight 231 lbs, bmi 42.2 from 7/13; qsymia trial 4/14; declined med supervised wt loss, bariatrics w Dr Singh Jarrett; IF 4/18    JESUS on CPAP 10/2012    Dr Cesar Mahmood; AHI 15, RDI 15, lowest sat 84%, CPAP 15CWP; now seeing Dr Leah Lopez Pseudomonas respiratory infection 2015    Dr Blayne Ricks Rheumatoid arthritis ([facility] 06/11/2020    Dr Medina Precise; ccp/ra+; MTX     Past Surgical History:   Procedure Laterality Date    BRONCHOSCOPY  1987    HX CATARACT REMOVAL Bilateral 2017    Dr Sandra Chow 3/12 adenoma; 10/13/17 polyp    HX HEENT  03/2017    Sinus Surgery X2; Dr Jero Ma Left 06/2014    Dr Brooke Upton  2/15    echo nl lv, ef 55%, nl wma, mild mr [facility])    PA CARDIAC SURG PROCEDURE UNLIST  2/15    holter negative    PA CHEST SURGERY PROCEDURE UNLISTED  03/2017    lung biopsy [address]    PA THORACOSCOPY W/LOBECTOMY SINGLE LOBE  07/06    s/p JOSE Lobectomy Dr. Sixto Alcocer 2006     Social History     Socioeconomic History    Marital status:    Occupational History    Occupation: retired RN   Tobacco Use    Smoking status: Never Smoker    Smokeless tobacco: Never Used   Substance and Sexual Activity    Alcohol use: No    Drug use: No     Current Outpatient Medications   Medication Sig    certolizumab pegoL (CIMZIA) 400 mg (200 mg x 2 vials) injection 200 mg by SubCUTAneous route every month.  Restasis 0.05 % dpet     fluticasone-umeclidinium-vilanterol (Trelegy Ellipta) 100-62.5-25 mcg inhaler Take 1 Puff by inhalation daily.  amLODIPine (NORVASC) 10 mg tablet TAKE 1 TABLET BY MOUTH EVERY DAY    folic acid (FOLVITE) 1 mg tablet Take 1 mg by mouth daily.  methotrexate (RHEUMATREX) 2.5 mg tablet Take 25 mg by mouth every seven (7) days.     predniSONE (DELTASONE) 5 mg tablet Take 5 mg by mouth daily.  cholecalciferol, vitamin D3, (VITAMIN D3) 2,000 unit tab Take  by mouth.  albuterol (PROVENTIL VENTOLIN) 2.5 mg /3 mL (0.083 %) nebulizer solution 2.5 mg.    albuterol (PROVENTIL HFA, VENTOLIN HFA, PROAIR HFA) 90 mcg/actuation inhaler 2 Puffs every six (6) hours as needed.  cpap machine kit by Does Not Apply route. CPAP at setting of 15 CWP with ramp of 20 minutes with humidifier. Mask: Mirage Quattro FX small size. Length of need 99 months. Replace mask and accessories as needed times 12 months. Download data at 45 days and fax at [phone]. No current facility-administered medications for this visit. Allergies   Allergen Reactions    Accolate [Zafirlukast] Other (comments)     Headache?  Lisinopril Cough    Pollen Extracts Cough     Other reaction(s): wheezing/sob  Other reaction(s): wheezing/sob    Soma [Carisoprodol] Other (comments)     headache     REVIEW OF SYSTEMS: mammo 10/20, colo 10/17 Dr Preston Wilks, DEXA 10/20  Ophtho - no vision change or eye pain  Oral - no mouth pain, tongue or tooth problems  Ears - no hearing loss, ear pain, fullness, no swallowing problems  Cardiac - no CP, PND, orthopnea, palpitations or syncope  Chest - no breast masses  GI - no heartburn, nausea, vomiting, change in bowel habits, bleeding  Urinary - no dysuria, hematuria, flank pain, urgency, frequency    Visit Vitals  /63   Pulse 80   Temp 97.7 °F (36.5 °C) (Temporal)   Resp 16   Ht 5' 3\" (1.6 m)   Wt 231 lb (104.8 kg)   SpO2 93%   BMI 40.92 kg/m²   A&O x3  Affect is appropriate. Mood stable  No apparent distress  Anicteric, no JVD, adenopathy or thyromegaly. No carotid bruits or radiated murmur  Lungs clear to auscultation, no wheezes or rales  Heart showed regular rate and rhythm. No murmur, rubs, gallops  Abdomen soft nontender, no hepatosplenomegaly or masses. Extremities without edema.   Pulses 1-2+ symmetrically    LABS  From 6/10 showed   gluc 83, cr 0.80, gfr>60,  alt 33,        chol 206, tg 157, hdl 49, ldl-c 126, wbc 8.2, hb 14.9, plt 275, tsh 0.77, vit d 31, b12 690  From 7/13 showed   gluc 99, cr 0.60, gfr>60,  alt 30,         chol 207, tg 159, hdl 51, ldl-c 124, wbc 7.4, hb 14.3, plt 239, ua neg,   From 3/14 showed                  hba1c 5.7, chol 202, tg 154, hdl 50, ldl-c 121, wbc 9.4, hb 14.1, plt 246  From 3/15 showed   gluc 93, cr 0.55, gfr>60,  alt<5,         wbc 8.6, hb 15.1, plt 277, tsh 1.00  From 4/15 showed   gluc 97, cr 0.77, gfr>60,   hba1c 5.7, chol 205, tg 162, hdl 48, ldl-c 125, wbc 7.1, hb 14.2, plt 244  From 10/15 showed gluc 97, cr 0.70, gfr>60,   hba1c 5.7, chol 226, tg 223, hdl 46, ldl-c 135  From 11/16 showed gluc 97, cr 0.54, gfr 103, alt 16, hba1c 5.5, chol 190, tg 130, hdl 49, ldl-c 115, wbc 6.4, hb 14.7, plt 246,            hep c neg  From 6/19 showed   gluc 95, cr 0.65, gfr>60,  alt 25,         chol 211, tg 192, hdl 53, ldl-c 120, wbc 5.9, hb 14.6, plt 244, tsh 0.97  From 6/20 showed   gluc 75, cr 0.60, gfr>60,  alt 13, hba1c 5.3, chol 225, tg 129, hdl 51, ldl-c 148, wb 13.6, hb 15.3, plt 312, tsh 0.84, ft4 1.40, vit d 34.6, RA+, CCP+, carissa neg, parvo neg, hep b/c neg, crp 2.8, uric 4.1  From 6/21 showed   gluc 90, cr 0.60, gfr>60,  alt 22, hba1c 5.2, chol 228, tg 224, hdl 51, ldl-c 132, wbc 8.4, hb 14.2, plt 277  From 1/22 showed   glu 105, cr 0.65, gfr>60,   hba1c 5.3, chol 211, tg 168, hdl 60, ldl-c 117    We reviewed the patient's labs from the last several visits to point out trends in the numbers    Calculated 10 year risk score was 8.3%        Patient Active Problem List   Diagnosis Code    Dyslipidemia E78.5    Bronchiectasis Dr Petrona Winston J47.9    JESUS (obstructive sleep apnea) G47.33    Asthma with COPD ([facility] J44.9    Primary hypertension I10    Gastroesophageal reflux disease without esophagitis K21.9    IGT (impaired glucose tolerance) R73.02    Chronic insomnia F51.04    Severe obesity (BMI 35.0-39. 9) with comorbidity ([facility]) E66.01    Rheumatoid arthritis involving multiple sites with positive rheumatoid factor (HCC) M05.79     Assessment and plan:  1. GERD. Continue current regimen. 2. HTN. Controlled on current  3. Obesity. Declined med supervised wt loss, bariatrics. Lifestyle and dietary measures. Portion control reiterated. 4. Dyslipidemia. Lifestyle and dietary measures. She is reluctant to take statins due to potential sfx but is willing to do ca score for further risk stratification  5. Chronic insomnia. Prn bdz  6. JESUS. F/U Dr Alaina Mistry  7. Asthma and COPD, bronchiectasis. Continue current, f/u Dr Nani Powell  8. RA. Continue current regimen and f/u Dr Anika Christina  9. Rectal bleeding. She has appt w Dr Hadley Schmidt      RTC 7/22    Above conditions discussed at length and patient vocalized understanding. All questions answered to patient satifaction        ICD-10-CM ICD-9-CM    1. Hyperlipidemia, unspecified hyperlipidemia type  E78.5 272.4 CT HEART W/O CONT WITH CALCIUM   2. Primary hypertension  I10 401.9    3. Gastroesophageal reflux disease without esophagitis  K21.9 530.81    4. IGT (impaired glucose tolerance)  D77.22 108.91 METABOLIC PANEL, COMPREHENSIVE      HEMOGLOBIN A1C WITH EAG   5. JESUS (obstructive sleep apnea)  G47.33 327.23    6. Bronchiectasis without complication ([facility] [address]  O14.6 494.0    7. Asthma with COPD ([facility] [address]  J44.9 493.20    8. Rheumatoid arthritis involving multiple sites with positive rheumatoid factor (HCC)  M05.79 714.0    9. Severe obesity (BMI 35.0-39. 9) with comorbidity ([facility]  E66.01 278.01    10.  Dyslipidemia  E78.5 272.4 LIPID PANEL

## 2022-01-26 ENCOUNTER — OFFICE VISIT (OUTPATIENT)
Dept: INTERNAL MEDICINE CLINIC | Age: 67
End: 2022-01-26
Payer: MEDICARE

## 2022-01-26 VITALS
RESPIRATION RATE: 16 BRPM | BODY MASS INDEX: 40.93 KG/M2 | DIASTOLIC BLOOD PRESSURE: 63 MMHG | SYSTOLIC BLOOD PRESSURE: 136 MMHG | TEMPERATURE: 97.7 F | WEIGHT: 231 LBS | HEART RATE: 80 BPM | OXYGEN SATURATION: 93 % | HEIGHT: 63 IN

## 2022-01-26 DIAGNOSIS — M05.79 RHEUMATOID ARTHRITIS INVOLVING MULTIPLE SITES WITH POSITIVE RHEUMATOID FACTOR (HCC): ICD-10-CM

## 2022-01-26 DIAGNOSIS — J44.9 ASTHMA WITH COPD (HCC): ICD-10-CM

## 2022-01-26 DIAGNOSIS — Z12.31 ENCOUNTER FOR SCREENING MAMMOGRAM FOR MALIGNANT NEOPLASM OF BREAST: ICD-10-CM

## 2022-01-26 DIAGNOSIS — R73.02 IGT (IMPAIRED GLUCOSE TOLERANCE): ICD-10-CM

## 2022-01-26 DIAGNOSIS — J47.9 BRONCHIECTASIS WITHOUT COMPLICATION (HCC): ICD-10-CM

## 2022-01-26 DIAGNOSIS — Z71.89 ADVANCED DIRECTIVES, COUNSELING/DISCUSSION: ICD-10-CM

## 2022-01-26 DIAGNOSIS — Z00.00 WELCOME TO MEDICARE PREVENTIVE VISIT: Primary | ICD-10-CM

## 2022-01-26 DIAGNOSIS — E78.5 DYSLIPIDEMIA: ICD-10-CM

## 2022-01-26 DIAGNOSIS — G47.33 OSA (OBSTRUCTIVE SLEEP APNEA): ICD-10-CM

## 2022-01-26 DIAGNOSIS — Z12.11 SCREEN FOR COLON CANCER: ICD-10-CM

## 2022-01-26 DIAGNOSIS — I10 PRIMARY HYPERTENSION: ICD-10-CM

## 2022-01-26 DIAGNOSIS — K21.9 GASTROESOPHAGEAL REFLUX DISEASE WITHOUT ESOPHAGITIS: ICD-10-CM

## 2022-01-26 DIAGNOSIS — E66.01 SEVERE OBESITY (BMI 35.0-39.9) WITH COMORBIDITY (HCC): ICD-10-CM

## 2022-01-26 DIAGNOSIS — E78.5 HYPERLIPIDEMIA, UNSPECIFIED HYPERLIPIDEMIA TYPE: ICD-10-CM

## 2022-01-26 PROCEDURE — 3017F COLORECTAL CA SCREEN DOC REV: CPT | Performed by: INTERNAL MEDICINE

## 2022-01-26 PROCEDURE — G8536 NO DOC ELDER MAL SCRN: HCPCS | Performed by: INTERNAL MEDICINE

## 2022-01-26 PROCEDURE — G8417 CALC BMI ABV UP PARAM F/U: HCPCS | Performed by: INTERNAL MEDICINE

## 2022-01-26 PROCEDURE — 99497 ADVNCD CARE PLAN 30 MIN: CPT | Performed by: INTERNAL MEDICINE

## 2022-01-26 PROCEDURE — G8752 SYS BP LESS 140: HCPCS | Performed by: INTERNAL MEDICINE

## 2022-01-26 PROCEDURE — G8399 PT W/DXA RESULTS DOCUMENT: HCPCS | Performed by: INTERNAL MEDICINE

## 2022-01-26 PROCEDURE — G0402 INITIAL PREVENTIVE EXAM: HCPCS | Performed by: INTERNAL MEDICINE

## 2022-01-26 PROCEDURE — G8427 DOCREV CUR MEDS BY ELIG CLIN: HCPCS | Performed by: INTERNAL MEDICINE

## 2022-01-26 PROCEDURE — 1101F PT FALLS ASSESS-DOCD LE1/YR: CPT | Performed by: INTERNAL MEDICINE

## 2022-01-26 PROCEDURE — 90000 NO LOS: CPT | Performed by: INTERNAL MEDICINE

## 2022-01-26 PROCEDURE — G8754 DIAS BP LESS 90: HCPCS | Performed by: INTERNAL MEDICINE

## 2022-01-26 PROCEDURE — G0463 HOSPITAL OUTPT CLINIC VISIT: HCPCS | Performed by: INTERNAL MEDICINE

## 2022-01-26 PROCEDURE — G9899 SCRN MAM PERF RSLTS DOC: HCPCS | Performed by: INTERNAL MEDICINE

## 2022-01-26 PROCEDURE — G8510 SCR DEP NEG, NO PLAN REQD: HCPCS | Performed by: INTERNAL MEDICINE

## 2022-01-26 PROCEDURE — 1090F PRES/ABSN URINE INCON ASSESS: CPT | Performed by: INTERNAL MEDICINE

## 2022-01-26 PROCEDURE — 99214 OFFICE O/P EST MOD 30 MIN: CPT | Performed by: INTERNAL MEDICINE

## 2022-01-26 RX ORDER — FLUTICASONE FUROATE, UMECLIDINIUM BROMIDE AND VILANTEROL TRIFENATATE 100; 62.5; 25 UG/1; UG/1; UG/1
1 POWDER RESPIRATORY (INHALATION) DAILY
COMMUNITY
Start: 2021-12-15

## 2022-01-26 RX ORDER — CYCLOSPORINE 0.5 MG/ML
EMULSION OPHTHALMIC
COMMUNITY
Start: 2022-01-17

## 2022-01-26 NOTE — PROGRESS NOTES
Brian Leal presents with   Chief Complaint   Patient presents with   [facility] Annual Wellness Visit    Hypertension    Labs     1-19-22            1. \"Have you been to the ER, urgent care clinic since your last visit? Hospitalized since your last visit? \" NO    2. \"Have you seen or consulted any other health care providers outside of the [address] [place] [address] since your last visit? \" NO    3. For patients aged 39-70: Has the patient had a colonoscopy? Yes - no Care Gap present     If the patient is female:    4. For patients aged 41-77: Has the patient had a mammogram within the past 2 years? Yes - no Care Gap present    5. For patients aged 21-65: Has the patient had a pap smear?  NA - based on age

## 2022-01-27 NOTE — PATIENT INSTRUCTIONS
Medicare Wellness Visit, Female     The best way to live healthy is to have a lifestyle where you eat a well-balanced diet, exercise regularly, limit alcohol use, and quit all forms of tobacco/nicotine, if applicable. Regular preventive services are another way to keep healthy. Preventive services (vaccines, screening tests, monitoring & exams) can help personalize your care plan, which helps you manage your own care. Screening tests can find health problems at the earliest stages, when they are easiest to treat. [facility] follows the current, evidence-based guidelines published by the [facility] ([facility]) when recommending preventive services for our patients. Because we follow these guidelines, sometimes recommendations change over time as research supports it. (For example, mammograms used to be recommended annually. Even though Medicare will still pay for an annual mammogram, the newer guidelines recommend a mammogram every two years for women of average risk). Of course, you and your doctor may decide to screen more often for some diseases, based on your risk and your co-morbidities (chronic disease you are already diagnosed with). Preventive services for you include:  - Medicare offers their members a free annual wellness visit, which is time for you and your primary care provider to discuss and plan for your preventive service needs. Take advantage of this benefit every year!  -All adults over the age of 72 should receive the recommended pneumonia vaccines. Current USPSTF guidelines recommend a series of two vaccines for the best pneumonia protection.   -All adults should have a flu vaccine yearly and a tetanus vaccine every 10 years.   -All adults age 48 and older should receive the shingles vaccines (series of two vaccines).       -All adults age 38-68 who are overweight should have a diabetes screening test once every three years.   -All adults born between [birth year] and 1964 should be screened once for Hepatitis C.  -Other screening tests and preventive services for persons with diabetes include: an eye exam to screen for diabetic retinopathy, a kidney function test, a foot exam, and stricter control over your cholesterol.   -Cardiovascular screening for adults with routine risk involves an electrocardiogram (ECG) at intervals determined by your doctor.   -Colorectal cancer screenings should be done for adults age 54-65 with no increased risk factors for colorectal cancer. There are a number of acceptable methods of screening for this type of cancer. Each test has its own benefits and drawbacks. Discuss with your doctor what is most appropriate for you during your annual wellness visit. The different tests include: colonoscopy (considered the best screening method), a fecal occult blood test, a fecal DNA test, and sigmoidoscopy.    -A bone mass density test is recommended when a woman turns 65 to screen for osteoporosis. This test is only recommended one time, as a screening. Some providers will use this same test as a disease monitoring tool if you already have osteoporosis. -Breast cancer screenings are recommended every other year for women of normal risk, age 54-69.  -Cervical cancer screenings for women over age 72 are only recommended with certain risk factors.      Here is a list of your current Health Maintenance items (your personalized list of preventive services) with a due date:  Health Maintenance Due   Topic Date Due    Shingles Vaccine (1 of 2) Never done    Cervical cancer screen  10/23/2020    COVID-19 Vaccine (3 - Booster for [place] Natal[place] series) 09/01/2021

## 2022-01-27 NOTE — PROGRESS NOTES
This is a \"Welcome to [employer]"  Initial Preventive Physical Examination    I have reviewed the patient's medical history in detail and updated the computerized patient record. Assessment/Plan   Education and counseling provided:  Are appropriate based on today's review and evaluation  End-of-Life planning (with patient's consent)  Influenza Vaccine  Screening Mammography  Colorectal cancer screening tests  Cardiovascular screening blood test  Bone mass measurement (DEXA)  Diabetes screening test    1. Welcome to Medicare preventive visit  2. Hyperlipidemia, unspecified hyperlipidemia type  -     CT HEART W/O CONT WITH CALCIUM; Future  3. Primary hypertension  4. Gastroesophageal reflux disease without esophagitis  5. IGT (impaired glucose tolerance)  -     METABOLIC PANEL, COMPREHENSIVE; Future  -     HEMOGLOBIN A1C WITH EAG; Future  6. JESUS (obstructive sleep apnea)  7. Bronchiectasis without complication ([facility]  8. Asthma with COPD ([facility]  9. Rheumatoid arthritis involving multiple sites with positive rheumatoid factor (HCC)  10. Severe obesity (BMI 35.0-39. 9) with comorbidity ([facility]  11. Dyslipidemia  -     LIPID PANEL; Future  12. Advanced directives, counseling/discussion  -     ADVANCE CARE PLANNING FIRST 27 MINS  15. Encounter for screening mammogram for malignant neoplasm of breast  -     WILLIE MAMMO BI SCREENING INCL CAD; Future  14. Screen for colon cancer       Depression Risk Screen     3 most recent PHQ Screens 1/26/2022   Little interest or pleasure in doing things Not at all   Feeling down, depressed, irritable, or hopeless Not at all   Total Score PHQ 2 0       Alcohol & Drug Abuse Risk Screen    Do you average more than 1 drink per night or more than 7 drinks a week:  No    On any one occasion in the past three months have you have had more than 3 drinks containing alcohol:  No          Functional Ability and Level of Safety    Diet: No special diet      Hearing: Hearing is good.       Vision Screening:  Vision is good. No exam data present      Activities of Daily Living: The home contains: no safety equipment. Patient does total self care      Ambulation: with no difficulty      Exercise level: moderately active     Fall Risk Screen:  Fall Risk Assessment, last 12 mths 1/26/2022   Able to walk? Yes   Fall in past 12 months? 0   Do you feel unsteady? 0   Are you worried about falling 0      Abuse Screen:  Patient is not abused       Screening EKG   EKG order placed: Yes    End of Life Planning   Advanced care planning directives were discussed with the patient and /or family/caregiver.      Health Maintenance Due     Health Maintenance Due   Topic Date Due    Shingrix Vaccine Age 49> (1 of 2) Never done    Cervical cancer screen  10/23/2020    COVID-19 Vaccine (3 - Booster for [employer] series) 09/01/2021       Patient Care Team   Patient Care Team:  Fadumo Cordero MD as PCP - General (Internal Medicine)  Fadumo Cordero MD as PCP - [address] [place] [address] Provider  Amrita Cole MD (Pulmonary Disease)    History     Past Medical History:   Diagnosis Date    Allergic rhinitis     Dr. Iraida Lopez on immunotherapy    Asthma     FEV1/FVC 64%, preFEV1 51%, postFEV1 57%, %, DLCO 96% (2/15 - Dr Nani Powell) ; daliresp oin past dc'ed due to cost    Bronchiectasis ([facility]     since childhood, idiopathic; CF negx2, A1AT neg, IgE nl, aspergillus nAb neg, sputum AFB neg, PPD neg    Chronic obstructive pulmonary disease ([facility]     Colon adenoma 03/2012    Dr. Butcher Begin 3/12; 10/13/17 polyp    Depression 04/2015    PHQ-9 was 11/27, DANNY-7 was 5/21; did well on lexapro    Dyslipidemia     calculated 10 year risk score was 2.5% (4/15), 3.9% (10/15); 5% (11/16); 5.9% (6/19); 6.8% (6/21); 8.3% (1/22)    GERD (gastroesophageal reflux disease) 05/1994    on egd    Hypertension     IGT (impaired glucose tolerance)     Insomnia     no response to trazodone, ambien, restoril    Meralgia paresthetica of left side 7/31/2017    Morbid obesity with BMI of 40.0-44.9, adult ([facility])     peak weight 231 lbs, bmi 42.2 from 7/13; qsymia trial 4/14; declined med supervised wt loss, bariatrics w Dr Kaycee Gurrola; IF 4/18    JESUS on CPAP 10/2012    Dr Clark Wyatt; AHI 15, RDI 15, lowest sat 84%, CPAP 15CWP; now seeing Dr Carla Man Pseudomonas respiratory infection 2015    Dr Prabha Lara    Rheumatoid arthritis ([facility] [address] [address] 06/11/2020    Dr Cb Landis; ccp/ra+; MTX      Past Surgical History:   Procedure Laterality Date    BRONCHOSCOPY  1987    HX CATARACT REMOVAL Bilateral 2017    Dr Rome Mcdaniel 3/12 adenoma; 10/13/17 polyp    HX HEENT  03/2017    Sinus Surgery X2; Dr Dimple Garcia Left 06/2014    Dr Haylee Erickson  2/15    echo nl lv, ef 55%, nl wma, mild mr [facility])    ND CARDIAC SURG PROCEDURE UNLIST  2/15    holter negative    ND CHEST SURGERY PROCEDURE UNLISTED  03/2017    lung biopsy [facility]    ND THORACOSCOPY W/LOBECTOMY SINGLE LOBE  07/06    s/p JOSE Lobectomy Dr. Amber Flores 2006     Current Outpatient Medications   Medication Sig Dispense Refill    certolizumab pegoL (CIMZIA) 400 mg (200 mg x 2 vials) injection 200 mg by SubCUTAneous route every month.  Restasis 0.05 % dpet       fluticasone-umeclidinium-vilanterol (Trelegy Ellipta) 100-62.5-25 mcg inhaler Take 1 Puff by inhalation daily.  amLODIPine (NORVASC) 10 mg tablet TAKE 1 TABLET BY MOUTH EVERY DAY 30 Tablet 11    folic acid (FOLVITE) 1 mg tablet Take 1 mg by mouth daily.  methotrexate (RHEUMATREX) 2.5 mg tablet Take 25 mg by mouth every seven (7) days.  predniSONE (DELTASONE) 5 mg tablet Take 5 mg by mouth daily.  cholecalciferol, vitamin D3, (VITAMIN D3) 2,000 unit tab Take  by mouth.       albuterol (PROVENTIL VENTOLIN) 2.5 mg /3 mL (0.083 %) nebulizer solution 2.5 mg.      albuterol (PROVENTIL HFA, VENTOLIN HFA, PROAIR HFA) 90 mcg/actuation inhaler 2 Puffs every six (6) hours as needed.  cpap machine kit by Does Not Apply route. CPAP at setting of 15 CWP with ramp of 20 minutes with humidifier. Mask: Mirage Quattro FX small size. Length of need 99 months. Replace mask and accessories as needed times 12 months. Download data at 45 days and fax at [phone].  Kit 0     Allergies   Allergen Reactions    Accolate [Zafirlukast] Other (comments)     Headache?     Lisinopril Cough    Pollen Extracts Cough     Other reaction(s): wheezing/sob  Other reaction(s): wheezing/sob    Soma [Carisoprodol] Other (comments)     headache       Family History   Problem Relation Age of Onset    Hypertension Mother     Elevated Lipids Mother     Cancer Father     Colon Polyps Father     OSTEOARTHRITIS Father     Crohn's Disease Sister     Migraines Brother      Social History     Tobacco Use    Smoking status: Never Smoker    Smokeless tobacco: Never Used   Substance Use Topics    Alcohol use: No       Eugenia Soriano MD

## 2022-01-27 NOTE — ACP (ADVANCE CARE PLANNING)
Advance Care Planning     Advance Care Planning (ACP) Physician/NP/PA Conversation      Date of Conversation: 1/26/2022  Conducted with: Patient with Decision Making Capacity    Healthcare Decision Maker:   No healthcare decision makers have been documented. Click here to complete [address] including selection of the Healthcare Decision Maker Relationship (ie \"Primary\")      Today we documented Decision Maker(s) consistent with Legal Next of Kin hierarchy. Care Preferences:    Hospitalization: \"If your health worsens and it becomes clear that your chance of recovery is unlikely, what would be your preference regarding hospitalization? \"  The patient would prefer hospitalization. Ventilation: \"If you were unable to breathe on your own and your chance of recovery was unlikely, what would be your preference about the use of a ventilator (breathing machine) if it was available to you? \"   The patient would desire the use of a ventilator. Resuscitation: \"In the event your heart stopped as a result of an underlying serious health condition, would you want attempts to be made to restart your heart, or would you prefer a natural death? \"   Yes, attempt to resuscitate.     Additional topics discussed: ventilation preferences, hospitalization preferences and resuscitation preferences    Conversation Outcomes / Follow-Up Plan:   ACP in process - information provided, considering goals and options  Reviewed DNR/DNI and patient elects Full Code (Attempt Resuscitation)     Length of Voluntary ACP Conversation in minutes:  16 minutes    Justine Rich MD

## 2022-01-30 DIAGNOSIS — Z12.31 ENCOUNTER FOR SCREENING MAMMOGRAM FOR MALIGNANT NEOPLASM OF BREAST: ICD-10-CM

## 2022-02-10 DIAGNOSIS — E78.5 HYPERLIPIDEMIA, UNSPECIFIED HYPERLIPIDEMIA TYPE: ICD-10-CM

## 2022-02-11 ENCOUNTER — HOSPITAL ENCOUNTER (OUTPATIENT)
Dept: CT IMAGING | Age: 67
Discharge: HOME | End: 2022-02-11
Attending: INTERNAL MEDICINE

## 2022-02-11 PROCEDURE — 75571 CT HRT W/O DYE W/CA TEST: CPT

## 2022-02-15 ENCOUNTER — TELEPHONE (OUTPATIENT)
Dept: INTERNAL MEDICINE CLINIC | Age: 67
End: 2022-02-15

## 2022-02-22 ENCOUNTER — TELEPHONE (OUTPATIENT)
Dept: INTERNAL MEDICINE CLINIC | Age: 67
End: 2022-02-22

## 2022-02-22 DIAGNOSIS — E78.5 DYSLIPIDEMIA: Primary | ICD-10-CM

## 2022-02-22 NOTE — TELEPHONE ENCOUNTER
Patient called and stated that she recently had a CT heart scan and would like to speak to Dr. Salty Schwartz or a nurse about her results and what her next steps are. She can be reached at [phone].     Please advise, thank you

## 2022-02-23 ENCOUNTER — TELEPHONE (OUTPATIENT)
Dept: INTERNAL MEDICINE CLINIC | Age: 67
End: 2022-02-23

## 2022-02-23 NOTE — TELEPHONE ENCOUNTER
----- Message from Milagro Samuel sent at 2/23/2022  8:34 AM EST -----  Subject: Message to Provider    QUESTIONS  Information for Provider? Pt. Des Velázquez; Provider David Ortiz. Lauriviridiana Teddy is   following up on paperwork that needs to be faxed to her pulmonary [place] Patrick MD; fax numbers [phone] or [phone]; Pt. spoke to   pulmonary office yesterday and they still had not received. Please call   the patient  ---------------------------------------------------------------------------  --------------  CALL BACK INFO  What is the best way for the office to contact you? OK to leave message on   voicemail  Preferred Call Back Phone Number? [phone]  ---------------------------------------------------------------------------  --------------  SCRIPT ANSWERS  Relationship to Patient?  Self

## 2022-03-02 NOTE — TELEPHONE ENCOUNTER
Pt calling about not hearing from the office re: CT heart scan. I gave her Dr Tony Edmond message from 02/22/2022:   Ca score 16 which is tiny amount of calcification - however, still technically above the cutoff of 10  Will still end up suggesting statin - lipitor 10  Recheck labs and ov 4 mos if amenable      She was concerned that no one had called her back  Stated \" I want to do whatever Dr Cee Post is recommending\". If he recommends her starting medication then she wants to do that    Pharmacy is [facility]    4 mo f/u and lab not scheduled yet. Can we schedule this when we call her back letting her know medication was sent to the pharmacy or please advise.

## 2022-03-03 RX ORDER — ATORVASTATIN CALCIUM 10 MG/1
10 TABLET, FILM COATED ORAL DAILY
Qty: 30 TABLET | Refills: 5 | Status: SHIPPED | OUTPATIENT
Start: 2022-03-03 | End: 2022-07-18

## 2022-03-19 PROBLEM — E66.01 SEVERE OBESITY (BMI 35.0-39.9) WITH COMORBIDITY (HCC): Status: ACTIVE | Noted: 2018-04-23

## 2022-03-19 PROBLEM — F51.04 CHRONIC INSOMNIA: Status: ACTIVE | Noted: 2017-03-29

## 2022-03-19 PROBLEM — M05.79 RHEUMATOID ARTHRITIS INVOLVING MULTIPLE SITES WITH POSITIVE RHEUMATOID FACTOR (HCC): Status: ACTIVE | Noted: 2020-06-11

## 2022-04-21 ENCOUNTER — OFFICE VISIT (OUTPATIENT)
Dept: INTERNAL MEDICINE CLINIC | Age: 67
End: 2022-04-21

## 2022-04-21 ENCOUNTER — HOSPITAL ENCOUNTER (OUTPATIENT)
Dept: LAB | Age: 67
Discharge: HOME | End: 2022-04-21
Payer: MEDICARE

## 2022-04-21 VITALS
WEIGHT: 231 LBS | SYSTOLIC BLOOD PRESSURE: 153 MMHG | HEART RATE: 95 BPM | TEMPERATURE: 97 F | RESPIRATION RATE: 16 BRPM | BODY MASS INDEX: 42.51 KG/M2 | OXYGEN SATURATION: 91 % | HEIGHT: 62 IN | DIASTOLIC BLOOD PRESSURE: 74 MMHG

## 2022-04-21 DIAGNOSIS — N30.00 ACUTE CYSTITIS WITHOUT HEMATURIA: Primary | ICD-10-CM

## 2022-04-21 DIAGNOSIS — N30.00 ACUTE CYSTITIS WITHOUT HEMATURIA: ICD-10-CM

## 2022-04-21 PROCEDURE — 87086 URINE CULTURE/COLONY COUNT: CPT

## 2022-04-21 RX ORDER — NITROFURANTOIN 25; 75 MG/1; MG/1
100 CAPSULE ORAL 2 TIMES DAILY
Qty: 14 CAPSULE | Refills: 0 | Status: SHIPPED | OUTPATIENT
Start: 2022-04-21 | End: 2022-06-01 | Stop reason: ALTCHOICE

## 2022-04-21 RX ORDER — SODIUM CHLORIDE FOR INHALATION 0.9 %
VIAL, NEBULIZER (ML) INHALATION
COMMUNITY
Start: 2022-04-11

## 2022-04-21 RX ORDER — MONTELUKAST SODIUM 10 MG/1
10 TABLET ORAL DAILY
COMMUNITY
Start: 2022-03-31

## 2022-04-21 NOTE — PROGRESS NOTES
Lizzeth Hurtado presents with   Chief Complaint   Patient presents with    UTI     X one day, patient experiencing dysuria in the form of spasms and frequency                1. \"Have you been to the ER, urgent care clinic since your last visit? Hospitalized since your last visit? \" No    2. \"Have you seen or consulted any other health care providers outside of the [address] [place] [address] since your last visit? \" No     3. For patients aged 39-70: Has the patient had a colonoscopy / FIT/ Cologuard? Yes - no Care Gap present      If the patient is female:    4. For patients aged 41-77: Has the patient had a mammogram within the past 2 years? Yes - no Care Gap present      5. For patients aged 21-65: Has the patient had a pap smear?  NA - based on age or sex
Pt just wanted to drop off UA and inadvertently put on the schedule    Reports onset of vague dysuria early this morning, she has been trying to hydrate aggressively to try to get ahead of it. Denies any fevers, flank pain, hematuria. She is immunocompromised. No fevers or other systemic complaints    Back showed no CVA tenderness. Abdomen soft and nontender. UA showed trace nitrites, negative leukocyte esterase    Presumed UTI, Macrobid given, cultures pending.     No charges placed
Initial

## 2022-04-22 LAB
BACTERIA SPEC CULT: NORMAL
SERVICE CMNT-IMP: NORMAL

## 2022-04-24 ENCOUNTER — TELEPHONE (OUTPATIENT)
Dept: INTERNAL MEDICINE CLINIC | Age: 67
End: 2022-04-24

## 2022-04-28 ENCOUNTER — TELEPHONE (OUTPATIENT)
Dept: INTERNAL MEDICINE CLINIC | Age: 67
End: 2022-04-28

## 2022-04-28 DIAGNOSIS — K42.9 UMBILICAL HERNIA WITHOUT OBSTRUCTION AND WITHOUT GANGRENE: Primary | ICD-10-CM

## 2022-04-28 NOTE — TELEPHONE ENCOUNTER
----- Message from Ranjith West sent at 4/28/2022 12:05 PM EDT -----  Subject: Referral Request    QUESTIONS   Reason for referral request? Umbilical Hernia  Needs Referral  Please   advise  pt is unsure if she needs a appt first with PCP  Fax number   [phone]   Has the physician seen you for this condition before? No   Preferred Specialist (if applicable)? Do you already have an appointment scheduled? No  Additional Information for Provider?   ---------------------------------------------------------------------------  --------------  CALL BACK INFO  What is the best way for the office to contact you? OK to leave message on   voicemail  Preferred Call Back Phone Number? [phone]  ---------------------------------------------------------------------------  --------------  SCRIPT ANSWERS  Relationship to Patient?  Self

## 2022-04-28 NOTE — TELEPHONE ENCOUNTER
Patient reached, she stated she wants to be referred to: Saeed Garcia  [facility]. [address] [place] [address], [place] [address]  Main Phone: [phone]    Fax: [phone]    Referral entered, patient made aware.

## 2022-05-22 NOTE — PROGRESS NOTES
73 y.o. WHITE/NON- female who presents for med clearance    She will be undergoing sinus surgery by Dr Jere Stone 6/22/22    Her RA continues to be managed by Dr Beckie Cruz and on [place]. She still has flares periodically and has to take pred bursts    No cp, sob, pnd, worsening edema, palpitations or syncope. No exercise due to RA and pulm problems, just adls, uses a rollator when she has to walk long distances. Bp controlled when she checks    Sees Dr Meghan Guzman for the asthma, now back on daliresp and trelegy. She has not had to use rescue much apparently    Kamilla Baba controlled; she is due for colo in fall 2022    Denies polyuria, polydipsia, nocturia, vision change. Not checking sugars at this time.       LAST MEDICARE WELLNESS EXAM: 1/26/22    Past Medical History:   Diagnosis Date    Agatston CAC score, <100 02/2022    ca score 16    Allergic rhinitis     Dr. Ines Rodriguez on immunotherapy    Asthma     FEV1/FVC 64%, preFEV1 51%, postFEV1 57%, %, DLCO 96% (2/15 - Dr Meghan Guzman) ; daliresp oin past dc'ed due to cost    Bronchiectasis ([facility] [address]     since childhood, idiopathic; CF negx2, A1AT neg, IgE nl, aspergillus nAb neg, sputum AFB neg, PPD neg    Chronic obstructive pulmonary disease ([facility]     Colon adenoma 03/2012    Dr. Jina Hunt 3/12; 10/13/17 polyp    Depression 04/2015    PHQ-9 was 11/27, DANNY-7 was 5/21; did well on lexapro    Dyslipidemia     calculated 10 year risk score was 2.5% (4/15), 3.9% (10/15); 5% (11/16); 5.9% (6/19); 6.8% (6/21); 8.3% (1/22)    GERD (gastroesophageal reflux disease) 05/1994    on egd    Hypertension     IGT (impaired glucose tolerance)     Insomnia     no response to trazodone, ambien, restoril    Meralgia paresthetica of left side 7/31/2017    Morbid obesity with BMI of 40.0-44.9, adult ([facility])     peak weight 231 lbs, bmi 42.2 from 7/13; qsymia trial 4/14; declined med supervised wt loss, bariatrics w Dr Danilo Stanton; IF 4/18    JESUS on CPAP 10/2012    Dr Leila Rodríguez; AHI 15, RDI 15, lowest sat 84%, CPAP 15CWP; now seeing Dr Raf Cooper Pseudomonas respiratory infection 2015    Dr Germania Arriaga    Rheumatoid arthritis ([facility] [address] 06/11/2020    Dr Jolene Sotelo; ccp/ra+; MTX     Past Surgical History:   Procedure Laterality Date    BRONCHOSCOPY  1987    HX CATARACT REMOVAL Bilateral 2017    Dr Beatriz Ham 3/12 adenoma; 10/13/17 polyp    HX HEENT  03/2017    Sinus Surgery X2; Dr Vanessa Connelly Left 06/2014    Dr Petrona Nina  2/15    echo nl lv, ef 55%, nl wma, mild mr [facility])    [place] CARDIAC SURG PROCEDURE UNLIST  2/15    holter negative    [place] CHEST SURGERY PROCEDURE UNLISTED  03/2017    lung biopsy [facility]    [place] THORACOSCOPY W/LOBECTOMY SINGLE LOBE  07/06    s/p JOSE Lobectomy Dr. Cristo Olmos 2006     Social History     Socioeconomic History    Marital status:    Occupational History    Occupation: retired RN   Tobacco Use    Smoking status: Never Smoker    Smokeless tobacco: Never Used   Substance and Sexual Activity    Alcohol use: No    Drug use: No     Current Outpatient Medications   Medication Sig    montelukast (SINGULAIR) 10 mg tablet Take 10 mg by mouth daily.  sodium chloride 0.9 % nebu TAKE 3 ML INHALED BY MOUTH 2 (TWO) TIMES A DAY.  fluticasone propionate (FLONASE NA) by Nasal route.  atorvastatin (LIPITOR) 10 mg tablet Take 1 Tablet by mouth daily.  certolizumab pegoL (CIMZIA) 400 mg (200 mg x 2 vials) injection 200 mg by SubCUTAneous route every month.  Restasis 0.05 % dpet     fluticasone-umeclidinium-vilanterol (Trelegy Ellipta) 100-62.5-25 mcg inhaler Take 1 Puff by inhalation daily.  amLODIPine (NORVASC) 10 mg tablet TAKE 1 TABLET BY MOUTH EVERY DAY    folic acid (FOLVITE) 1 mg tablet Take 1 mg by mouth daily.  methotrexate (RHEUMATREX) 2.5 mg tablet Take 25 mg by mouth every seven (7) days.  predniSONE (DELTASONE) 5 mg tablet Take 5 mg by mouth daily.     cholecalciferol, vitamin D3, (VITAMIN D3) 2,000 unit tab Take  by mouth.  albuterol (PROVENTIL VENTOLIN) 2.5 mg /3 mL (0.083 %) nebulizer solution 2.5 mg.    albuterol (PROVENTIL HFA, VENTOLIN HFA, PROAIR HFA) 90 mcg/actuation inhaler 2 Puffs every six (6) hours as needed.  cpap machine kit by Does Not Apply route. CPAP at setting of 15 CWP with ramp of 20 minutes with humidifier. Mask: Mirage Quattro FX small size. Length of need 99 months. Replace mask and accessories as needed times 12 months. Download data at 45 days and fax at [phone].  predniSONE (DELTASONE) 10 mg tablet      No current facility-administered medications for this visit. Allergies   Allergen Reactions    Accolate [Zafirlukast] Other (comments)     Headache?  Lisinopril Cough    Pollen Extracts Cough     Other reaction(s): wheezing/sob  Other reaction(s): wheezing/sob    Soma [Carisoprodol] Other (comments)     headache     REVIEW OF SYSTEMS: mammo 10/20, colo 10/17 Dr Kayla Healy, DEXA 10/20  Ophtho - no vision change or eye pain  Oral - no mouth pain, tongue or tooth problems  Ears - no hearing loss, ear pain, fullness, no swallowing problems  Cardiac - no CP, PND, orthopnea, palpitations or syncope  Chest - no breast masses  GI - no heartburn, nausea, vomiting, change in bowel habits, bleeding  Urinary - no dysuria, hematuria, flank pain, urgency, frequency    Visit Vitals  /62   Pulse 71   Temp 97.3 °F (36.3 °C) (Temporal)   Resp 16   Ht 5' 2\" (1.575 m)   Wt 227 lb (103 kg)   SpO2 99%   BMI 41.52 kg/m²   A&O x3  Affect is appropriate. Mood stable  No apparent distress  Anicteric, no JVD, adenopathy or thyromegaly. No carotid bruits or radiated murmur  Lungs clear to auscultation, no wheezes or rales  Heart showed regular rate and rhythm. No murmur, rubs, gallops  Abdomen soft nontender, no hepatosplenomegaly or masses. Extremities without edema.   Pulses 1-2+ symmetrically    LABS  From 6/10 showed   gluc 83, cr 0.80, gfr>60,  alt 33,        chol 206, tg 157, hdl 49, ldl-c 126, wbc 8.2, hb 14.9, plt 275, tsh 0.77, vit d 31, b12 690  From 7/13 showed   gluc 99, cr 0.60, gfr>60,  alt 30,         chol 207, tg 159, hdl 51, ldl-c 124, wbc 7.4, hb 14.3, plt 239, ua neg,   From 3/14 showed                  hba1c 5.7, chol 202, tg 154, hdl 50, ldl-c 121, wbc 9.4, hb 14.1, plt 246  From 3/15 showed   gluc 93, cr 0.55, gfr>60,  alt<5,         wbc 8.6, hb 15.1, plt 277, tsh 1.00  From 4/15 showed   gluc 97, cr 0.77, gfr>60,   hba1c 5.7, chol 205, tg 162, hdl 48, ldl-c 125, wbc 7.1, hb 14.2, plt 244  From 10/15 showed gluc 97, cr 0.70, gfr>60,   hba1c 5.7, chol 226, tg 223, hdl 46, ldl-c 135  From 11/16 showed gluc 97, cr 0.54, gfr 103, alt 16, hba1c 5.5, chol 190, tg 130, hdl 49, ldl-c 115, wbc 6.4, hb 14.7, plt 246,            hep c neg  From 6/19 showed   gluc 95, cr 0.65, gfr>60,  alt 25,         chol 211, tg 192, hdl 53, ldl-c 120, wbc 5.9, hb 14.6, plt 244, tsh 0.97  From 6/20 showed   gluc 75, cr 0.60, gfr>60,  alt 13, hba1c 5.3, chol 225, tg 129, hdl 51, ldl-c 148, wb 13.6, hb 15.3, plt 312, tsh 0.84, ft4 1.40, vit d 34.6, RA+, CCP+, carissa neg, parvo neg, hep b/c neg, crp 2.8, uric 4.1  From 6/21 showed   gluc 90, cr 0.60, gfr>60,  alt 22, hba1c 5.2, chol 228, tg 224, hdl 51, ldl-c 132, wbc 8.4, hb 14.2, plt 277  From 1/22 showed   glu 105, cr 0.65, gfr>60,   hba1c 5.3, chol 211, tg 168, hdl 60, ldl-c 117    We reviewed the patient's labs from the last several visits to point out trends in the numbers    PREOP  From 5/22 showed wbc 12.7, hb 15.1, plt 279  EKG as read by me showed nsr rate 70, nl int, no acute changes    Patient Active Problem List   Diagnosis Code    Dyslipidemia E78.5    Bronchiectasis Dr Chintan Hagan J47.9    JESUS (obstructive sleep apnea) G47.33    Asthma with COPD ([facility] J44.9    Primary hypertension I10    Gastroesophageal reflux disease without esophagitis K21.9    IGT (impaired glucose tolerance) R73.02    Chronic insomnia F51.04    Severe obesity (BMI 35.0-39. 9) with comorbidity (HCC) E66.01    Rheumatoid arthritis involving multiple sites with positive rheumatoid factor (HCC) M05.79     Assessment and plan:  1. GERD. Continue current regimen. 2. HTN. Controlled on current  3. Obesity. Declined med supervised wt loss, bariatrics. Lifestyle and dietary measures. Portion control reiterated. 4. Dyslipidemia. Continue lipitor  5. Chronic insomnia. Prn bdz  6. JESUS. F/U Dr Ricky Omer  7. Asthma and COPD, bronchiectasis. Continue current, f/u Dr Rita Hull  8. RA. Continue current regimen and f/u Dr Virginia Lyn  9. ENT. She is felt to be average risk for the planned procedure, no contraindications noted. Routine postop prophylaxis per protocol. RTC 7/22    Above conditions discussed at length and patient vocalized understanding. All questions answered to patient satifaction        ICD-10-CM ICD-9-CM    1. Pre-op evaluation  Z01.818 V72.84 AMB POC EKG ROUTINE W/ 12 LEADS, INTER & REP   2. Primary hypertension  I10 401.9    3. Asthma with COPD ([facility] [address] [address]  J44.9 493.20    4.  Rheumatoid arthritis involving multiple sites with positive rheumatoid factor (HCC)  M05.79 714.0

## 2022-06-01 ENCOUNTER — OFFICE VISIT (OUTPATIENT)
Dept: INTERNAL MEDICINE CLINIC | Age: 67
End: 2022-06-01
Payer: MEDICARE

## 2022-06-01 VITALS
WEIGHT: 227 LBS | HEIGHT: 62 IN | SYSTOLIC BLOOD PRESSURE: 127 MMHG | TEMPERATURE: 97.3 F | BODY MASS INDEX: 41.77 KG/M2 | OXYGEN SATURATION: 99 % | DIASTOLIC BLOOD PRESSURE: 62 MMHG | HEART RATE: 71 BPM | RESPIRATION RATE: 16 BRPM

## 2022-06-01 DIAGNOSIS — Z01.818 PRE-OP EVALUATION: Primary | ICD-10-CM

## 2022-06-01 DIAGNOSIS — I10 PRIMARY HYPERTENSION: ICD-10-CM

## 2022-06-01 DIAGNOSIS — J44.9 ASTHMA WITH COPD (HCC): ICD-10-CM

## 2022-06-01 DIAGNOSIS — M05.79 RHEUMATOID ARTHRITIS INVOLVING MULTIPLE SITES WITH POSITIVE RHEUMATOID FACTOR (HCC): ICD-10-CM

## 2022-06-01 PROCEDURE — 99214 OFFICE O/P EST MOD 30 MIN: CPT | Performed by: INTERNAL MEDICINE

## 2022-06-01 PROCEDURE — 93010 ELECTROCARDIOGRAM REPORT: CPT | Performed by: INTERNAL MEDICINE

## 2022-06-01 PROCEDURE — G8754 DIAS BP LESS 90: HCPCS | Performed by: INTERNAL MEDICINE

## 2022-06-01 PROCEDURE — 1101F PT FALLS ASSESS-DOCD LE1/YR: CPT | Performed by: INTERNAL MEDICINE

## 2022-06-01 PROCEDURE — 1090F PRES/ABSN URINE INCON ASSESS: CPT | Performed by: INTERNAL MEDICINE

## 2022-06-01 PROCEDURE — G0463 HOSPITAL OUTPT CLINIC VISIT: HCPCS | Performed by: INTERNAL MEDICINE

## 2022-06-01 PROCEDURE — G8417 CALC BMI ABV UP PARAM F/U: HCPCS | Performed by: INTERNAL MEDICINE

## 2022-06-01 PROCEDURE — G8536 NO DOC ELDER MAL SCRN: HCPCS | Performed by: INTERNAL MEDICINE

## 2022-06-01 PROCEDURE — G8427 DOCREV CUR MEDS BY ELIG CLIN: HCPCS | Performed by: INTERNAL MEDICINE

## 2022-06-01 PROCEDURE — G8399 PT W/DXA RESULTS DOCUMENT: HCPCS | Performed by: INTERNAL MEDICINE

## 2022-06-01 PROCEDURE — G9899 SCRN MAM PERF RSLTS DOC: HCPCS | Performed by: INTERNAL MEDICINE

## 2022-06-01 PROCEDURE — 93005 ELECTROCARDIOGRAM TRACING: CPT | Performed by: INTERNAL MEDICINE

## 2022-06-01 PROCEDURE — G8752 SYS BP LESS 140: HCPCS | Performed by: INTERNAL MEDICINE

## 2022-06-01 PROCEDURE — 3017F COLORECTAL CA SCREEN DOC REV: CPT | Performed by: INTERNAL MEDICINE

## 2022-06-01 PROCEDURE — G8510 SCR DEP NEG, NO PLAN REQD: HCPCS | Performed by: INTERNAL MEDICINE

## 2022-06-01 PROCEDURE — 1123F ACP DISCUSS/DSCN MKR DOCD: CPT | Performed by: INTERNAL MEDICINE

## 2022-06-01 RX ORDER — PREDNISONE 10 MG/1
TABLET ORAL
COMMUNITY
Start: 2022-05-31

## 2022-06-01 NOTE — PROGRESS NOTES
Cindi Dalal presents with   Chief Complaint   Patient presents with   [address] [place] [address] Pre-op Exam     ENT surgery scheduled 6-15-22 with Dr. Estiven Winter            1. \"Have you been to the ER, urgent care clinic since your last visit? Hospitalized since your last visit? \" No    2. \"Have you seen or consulted any other health care providers outside of the [address] [place] [address] since your last visit? \" [facility], and Dr. Jeffery Pathak     3. For patients aged 39-70: Has the patient had a colonoscopy / FIT/ Cologuard? Yes - no Care Gap present      If the patient is female:    4. For patients aged 41-77: Has the patient had a mammogram within the past 2 years? Yes - no Care Gap present      5. For patients aged 21-65: Has the patient had a pap smear? Yes - Care Gap present.  Most recent result on file

## 2022-08-01 ENCOUNTER — TELEPHONE (OUTPATIENT)
Dept: INTERNAL MEDICINE CLINIC | Age: 67
End: 2022-08-01

## 2022-08-01 DIAGNOSIS — M05.79 RHEUMATOID ARTHRITIS INVOLVING MULTIPLE SITES WITH POSITIVE RHEUMATOID FACTOR (HCC): Primary | ICD-10-CM

## 2022-08-01 NOTE — TELEPHONE ENCOUNTER
Pt is requesting a referral be sent to (Dorota Garcia)for rheumatoid arthritis. [phone].  Patient requesting a call [phone]

## 2022-12-15 ENCOUNTER — TELEPHONE (OUTPATIENT)
Dept: INTERNAL MEDICINE CLINIC | Age: 67
End: 2022-12-15

## 2022-12-15 NOTE — TELEPHONE ENCOUNTER
Patient called and said that the medication she is on called atorvastatin (Lipitor) is maybe causing pain in her muscles in her knee. She would like to know what should she do? Patient said this started yesterday afternoon. Please advise.

## 2022-12-15 NOTE — TELEPHONE ENCOUNTER
Contacted patient. Pain was localized to her right medial region of her thigh, not her knee. Patient stated the pain was bad last night (8/10), but has gone down dramatically today. Patient will stay on statin's for now and call back after 2 weeks to see if it persisted, or earlier if its gotten worse.

## 2022-12-15 NOTE — TELEPHONE ENCOUNTER
Muscle aches from statins do not tend to be localized - they tend to affect all the muscle groups so arms, thighs, calves, back, etc    Only way to answer the question is to come off statin but not sure i would do that if the pain has been present for only 1 day    True statin myopathy can only be dx'ed usually with at least 1-2 weeks worth of sx

## 2023-01-08 DIAGNOSIS — I10 PRIMARY HYPERTENSION: ICD-10-CM

## 2023-01-09 RX ORDER — AMLODIPINE BESYLATE 10 MG/1
TABLET ORAL
Qty: 90 TABLET | Refills: 3 | Status: SHIPPED | OUTPATIENT
Start: 2023-01-09

## 2023-01-11 ENCOUNTER — TELEPHONE (OUTPATIENT)
Dept: INTERNAL MEDICINE CLINIC | Age: 68
End: 2023-01-11

## 2023-01-11 NOTE — TELEPHONE ENCOUNTER
----- Message from Hoda Ritchie sent at 1/11/2023  2:24 PM EST -----  Subject: Message to Provider    QUESTIONS  Information for Provider? Pt is calling in wanting her lab orders faxed to   [facility] lab at [place] [address], [place], [address] Phone#   [phone] Please advise.   ---------------------------------------------------------------------------  --------------  [facility] INFO  [phone]; OK to leave message on voicemail  ---------------------------------------------------------------------------  --------------  SCRIPT ANSWERS  Relationship to Patient?  Self

## 2023-01-18 NOTE — PROGRESS NOTES
78 y.o. WHITE/NON- female who presents for evaluation    She underwent sinus surgery by Dr Dewey Solano although she had persistent sinus infection even afterwards    Sees Dr Zeina Gooden for the asthma and her bronchiectasis is apparently worsening. She is using O2 supp persistently now. She has had CTs and bronchoscopy and is currently getting inhaled abx for + pseudomonas on cx, afb neg so far. Her RA was not getting better on ciimzia so she changed over to Dr Harinder Griffin. She came off MTX also and interestingly, her sx are actually improved off meds      Her gerd has been active and she's seeing the NP at [facility], she was supposed to her ba swallow done but has not done so yet. Also due for colo    Denies polyuria, polydipsia, nocturia, vision change. Not checking sugars at this time.       She stopped taking the lipitor for a bit as she was having some leg pains but it didn't get better so willing to start back    LAST MEDICARE WELLNESS EXAM: 1/26/22, 1/31/23    Past Medical History:   Diagnosis Date    Agatston CAC score, <100 02/2022    ca score 16    Allergic rhinitis     Dr. Zeynep Yuan on immunotherapy    Asthma with COPD ([facility]     FEV1/FVC 64%, preFEV1 51%, postFEV1 57%, %, DLCO 96% (2/15 - Dr Zeina Gooden) ; daliresp oin past OK'ed due to cost    Bronchiectasis [facility])     since childhood, idiopathic; CF negx2, A1AT neg, IgE nl, aspergillus nAb neg, sputum AFB neg, PPD neg; Dr Benji Ortez adenoma 03/2012    Dr. Ramos Cui 3/12; 10/13/17 polyp    Depression 04/2015    PHQ-9 was 11/27, DANNY-7 was 5/21; did well on lexapro    Dyslipidemia     calculated 10 year risk score was 2.5% (4/15), 3.9% (10/15); 5% (11/16); 5.9% (6/19); 6.8% (6/21); 8.3% (1/22)    GERD (gastroesophageal reflux disease) 05/1994    on egd    H/O echocardiogram     nl lv, ef 55%, nl wma, mild mr (2/15) [address]    Hypertension     IGT (impaired glucose tolerance)     Insomnia     no response to trazodone, ambien, restoril    Lipoma of right axilla 08/2022    Dr Marcel Duggan s/p resection    Meralgia paresthetica of left side 07/31/2017    Morbid obesity with BMI of 40.0-44.9, adult ([facility]     peak weight 231 lbs, bmi 42.2 from 7/13; qsymia trial 4/14; declined med supervised wt loss, bariatrics w Dr Favian Martell; IF 4/18    JESUS on CPAP 10/2012    Dr Serene Castro; AHI 15, RDI 15, lowest sat 84%, CPAP 15CWP; now seeing Dr Matilda Terrazas    Pseudomonas respiratory infection 2015    Dr Kameron Hunter    Rheumatoid arthritis ([facility] 06/11/2020    Dr Sanjuanita Borrego; ccp/ra+; MTX, cimzia; now Dr Rohith Farley     Past Surgical History:   Procedure Laterality Date    BRONCHOSCOPY  1987    HX CATARACT REMOVAL Bilateral 2017    Dr Stacie Marquez Guardian (3/12) adenoma; (10/13/17) polyp    HX HEENT  03/2017    Sinus Surgery X2 (3/17), (6/12); Dr Ekaterina Moore  28/8423    umbilical Dr Julia Franco Left 06/2014    Dr Chemo De La Cruz  07/2006    s/p JOSE Lobectomy Dr. Martha Sands 2006    [id number] [place] SURGERY  02/2015    holter negative    NM UNLISTED PROCEDURE LUNGS & PLEURA  03/2017    lung biopsy [facility]     Social History     Socioeconomic History    Marital status:    Occupational History    Occupation: retired RN   Tobacco Use    Smoking status: Never    Smokeless tobacco: Never   Substance and Sexual Activity    Alcohol use: No    Drug use: No     Current Outpatient Medications   Medication Sig    budesonide-formoteroL (SYMBICORT) 160-4.5 mcg/actuation HFAA Budesonide-Formoterol HFA Inhaler 160 mcg-4.5 mcg/actuation        active    mupirocin (BACTROBAN) 2 % ointment USE A ONE-HALF INCH RIBBON DAILY AS DIRECTED    cefepime (MAXIPIME) 2 gram injection     Breztri Aerosphere 160-9-4.8 mcg/actuation HFAA TAKE 2 PUFFS INHALED BY MOUTH 2 (TWO) TIMES A DAY. roflumilast (Daliresp) 500 mcg tab tablet Take 500 mcg by mouth daily. amLODIPine (NORVASC) 10 mg tablet Take 1 Tablet by mouth daily. predniSONE (DELTASONE) 10 mg tablet     folic acid (FOLVITE) 1 mg tablet Take 1 mg by mouth daily. predniSONE (DELTASONE) 5 mg tablet Take 5 mg by mouth daily. albuterol (PROVENTIL VENTOLIN) 2.5 mg /3 mL (0.083 %) nebulizer solution 2.5 mg.    albuterol (PROVENTIL HFA, VENTOLIN HFA, PROAIR HFA) 90 mcg/actuation inhaler 2 Puffs every six (6) hours as needed. cpap machine kit by Does Not Apply route. CPAP at setting of 15 CWP with ramp of 20 minutes with humidifier. Mask: Mirage Quattro FX small size. Length of need 99 months. Replace mask and accessories as needed times 12 months. Download data at 45 days and fax at [phone].    atorvastatin (LIPITOR) 10 mg tablet TAKE 1 TABLET BY MOUTH EVERY DAY (Patient not taking: Reported on 1/31/2023)    methotrexate (RHEUMATREX) 2.5 mg tablet Take 25 mg by mouth every seven (7) days. (Patient not taking: Reported on 1/31/2023)     No current facility-administered medications for this visit. Allergies   Allergen Reactions    Accolate [Zafirlukast] Other (comments)     Headache? Lisinopril Cough    Pollen Extracts Cough     Other reaction(s): wheezing/sob  Other reaction(s): wheezing/sob    Soma [Carisoprodol] Other (comments)     headache     REVIEW OF SYSTEMS: mammo 10/20, colo 10/17 Dr Landon Moore, DEXA 10/20  Ophtho - no vision change or eye pain  Oral - no mouth pain, tongue or tooth problems  Ears - no hearing loss, ear pain, fullness, no swallowing problems  Cardiac - no CP, PND, orthopnea, palpitations or syncope  Chest - no breast masses  GI - no heartburn, nausea, vomiting, change in bowel habits, bleeding  Urinary - no dysuria, hematuria, flank pain, urgency, frequency    Visit Vitals  BP (!) 128/58   Pulse 75   Temp 97.3 °F (36.3 °C) (Temporal)   Resp 16   Ht 5' 2\" (1.575 m)   Wt 207 lb (93.9 kg)   SpO2 94%   BMI 37.86 kg/m²     A&O x3  Affect is appropriate. Mood stable  No apparent distress  Anicteric, no JVD, adenopathy or thyromegaly.    No carotid bruits or radiated murmur  Lungs clear to auscultation, no wheezes   Heart showed regular rate and rhythm. No murmur, rubs, gallops  Abdomen soft nontender, no hepatosplenomegaly or masses. Extremities without edema.   Pulses 1-2+ symmetrically    LABS  From 6/10 showed   gluc 83, cr 0.80, gfr>60,  alt 33,         chol 206, tg 157, hdl 49, ldl-c 126, wbc 8.2, hb 14.9, plt 275, tsh 0.77, vit d 31, b12 690  From 7/13 showed   gluc 99, cr 0.60, gfr>60,  alt 30,          chol 207, tg 159, hdl 51, ldl-c 124, wbc 7.4, hb 14.3, plt 239, ua neg,   From 3/14 showed                  hba1c 5.7,  chol 202, tg 154, hdl 50, ldl-c 121, wbc 9.4, hb 14.1, plt 246  From 3/15 showed   gluc 93, cr 0.55, gfr>60,  alt<5,           wbc 8.6, hb 15.1, plt 277, tsh 1.00  From 4/15 showed   gluc 97, cr 0.77, gfr>60,    hba1c 5.7, chol 205, tg 162, hdl 48, ldl-c 125, wbc 7.1, hb 14.2, plt 244  From 10/15 showed gluc 97, cr 0.70, gfr>60,    hba1c 5.7, chol 226, tg 223, hdl 46, ldl-c 135  From 11/16 showed gluc 97, cr 0.54, gfr 103, alt 16, hba1c 5.5, chol 190, tg 130, hdl 49, ldl-c 115, wbc 6.4, hb 14.7, plt 246,               hep c-  From 6/19 showed   gluc 95, cr 0.65, gfr>60,  alt 25,          chol 211, tg 192, hdl 53, ldl-c 120, wbc 5.9, hb 14.6, plt 244, tsh 0.97  From 6/20 showed   gluc 75, cr 0.60, gfr>60,  alt 13, hba1c 5.3, chol 225, tg 129, hdl 51, ldl-c 148, wb 13.6, hb 15.3, plt 312, tsh 0.84, ft4 1.40, vit d 34.6, RA+, CCP+, carissa neg, parvo neg, hep b/c-, crp 2.8, uric 4.1  From 6/21 showed   gluc 90, cr 0.60, gfr>60,  alt 22, hba1c 5.2, chol 228, tg 224, hdl 51, ldl-c 132, wbc 8.4, hb 14.2, plt 277  From 1/22 showed   glu 105, cr 0.65, gfr>60,   hba1c 5.3,  chol 211, tg 168, hdl 60, ldl-c 117  From 5/22 showed                wbc 12.7, hb 15.1, plt 279    Results for orders placed or performed in visit on 01/11/23   LIPID PANEL   Result Value Ref Range    Triglyceride 134 40 - 149 mg/dL    HDL Cholesterol 77 >=40 mg/dL Cholesterol, total 220 (H) 110 - 200 mg/dL    CHOLESTEROL/HDL 2.9 0.0 - 5.0    Non-HDL Cholesterol 143 (H) <130 mg/dL    LDL, calculated 116 (H) 50 - 99 mg/dL    VLDL, calculated 27 8 - 30 mg/dL    LDL/HDL Ratio 1.5    METABOLIC PANEL, COMPREHENSIVE   Result Value Ref Range    Glucose 119 (H) 70 - 99 mg/dL    BUN 11 6 - 22 mg/dL    Creatinine 0.5 (L) 0.8 - 1.4 mg/dL    Sodium 141 133 - 145 mmol/L    Potassium 3.4 (L) 3.5 - 5.5 mmol/L    Chloride 101 98 - 110 mmol/L    CO2 34 (H) 20 - 32 mmol/L    AST (SGOT) 11 10 - 37 U/L    ALT (SGPT) 12 5 - 40 U/L    Alk. phosphatase 69 40 - 120 U/L    Bilirubin, total 0.5 0.2 - 1.2 mg/dL    Calcium 9.4 8.4 - 10.5 mg/dL    Protein, total 7.3 6.2 - 8.1 g/dL    Albumin 3.8 3.5 - 5.0 g/dL    A-G Ratio 1.1 1.1 - 2.6 ratio    Globulin 3.5 2.0 - 4.0 g/dL    GLOMERULAR FILTRATION RATE >60.0 >60.0 mL/min/1.73 sq.m. Anion gap 6.0 3.0 - 15.0 mmol/L       We reviewed the patient's labs from the last several visits to point out trends in the numbers          Patient Active Problem List   Diagnosis Code    Dyslipidemia E78.5    Bronchiectasis Dr Vanessa Severs J47.9    JESUS (obstructive sleep apnea) G47.33    Asthma with COPD ([facility] [address] J44.9    Primary hypertension I10    Gastroesophageal reflux disease without esophagitis K21.9    IGT (impaired glucose tolerance) R73.02    Chronic insomnia F51.04    Severe obesity (BMI 35.0-39. 9) with comorbidity (HCC) E66.01    Rheumatoid arthritis involving multiple sites with positive rheumatoid factor (HCC) M05.79     Assessment and plan:  1. GERD. Continue current regimen. 2. HTN. Controlled on current  3. Obesity. Lifestyle and dietary measures. Portion control reiterated. 4. Dyslipidemia. Restart ipitor  5. Chronic insomnia. Prn meds  6. JESUS. F/U specialist  7. Asthma and COPD, bronchiectasis. F/u Dr Vanessa Severs  8. RA. Follow up Dr Taco Casillas  9. IGT.   Dietary and lifestyle measures      RTC 1/24 per her preference    Above conditions discussed at length and patient vocalized understanding. All questions answered to patient satifaction        ICD-10-CM ICD-9-CM    1. Bronchiectasis without complication ([facility]  N98.9 494.0       2. Primary hypertension  I10 401.9 amLODIPine (NORVASC) 10 mg tablet      3. Severe obesity (BMI 35.0-39. 9) with comorbidity ([facility]  E66.01 278.01       4. Rheumatoid arthritis involving multiple sites with positive rheumatoid factor (HCC)  M05.79 714.0       5. Asthma with COPD ([facility]  J44.9 493.20       6. IGT (impaired glucose tolerance)  R73.02 790.22 CBC W/O DIFF      METABOLIC PANEL, COMPREHENSIVE      HEMOGLOBIN A1C WITH EAG      7. JESUS (obstructive sleep apnea)  G47.33 327.23       8.  Dyslipidemia  E78.5 272.4 LIPID PANEL

## 2023-01-18 NOTE — PROGRESS NOTES
This is a subsequent medicare wellness visit      I have reviewed the patient's medical history in detail and updated the computerized patient record. Assessment/Plan   Education and counseling provided:  Are appropriate based on today's review and evaluation  End-of-Life planning (with patient's consent)  Screening Mammography  Colorectal cancer screening tests  Cardiovascular screening blood test  Diabetes screening test    1. Medicare annual wellness visit, subsequent  2. Primary hypertension  -     amLODIPine (NORVASC) 10 mg tablet; Take 1 Tablet by mouth daily. , Normal, Disp-90 Tablet, R-3  3. Bronchiectasis without complication ([facility]  4. Severe obesity (BMI 35.0-39. 9) with comorbidity ([facility]  5. Rheumatoid arthritis involving multiple sites with positive rheumatoid factor (HCC)  6. Asthma with COPD ([facility]  7. IGT (impaired glucose tolerance)  -     CBC W/O DIFF; Future  -     METABOLIC PANEL, COMPREHENSIVE; Future  -     HEMOGLOBIN A1C WITH EAG; Future  8. JESUS (obstructive sleep apnea)  9. Dyslipidemia  -     LIPID PANEL; Future  10. Advanced directives, counseling/discussion  -     ADVANCE CARE PLANNING FIRST 27 MINS  11. Screen for colon cancer  -     REFERRAL FOR COLONOSCOPY  12. Encounter for screening mammogram for malignant neoplasm of breast  -     WILLIE MAMMO BI SCREENING INCL CAD; Future       Depression Risk Factor Screening     3 most recent PHQ Screens 1/31/2023   Little interest or pleasure in doing things Not at all   Feeling down, depressed, irritable, or hopeless Not at all   Total Score PHQ 2 0       Alcohol & Drug Abuse Risk Screen    Do you average more than 1 drink per night or more than 7 drinks a week:  No    On any one occasion in the past three months have you have had more than 3 drinks containing alcohol:  No          Functional Ability and Level of Safety    Hearing: Hearing is good. Activities of Daily Living: The home contains: no safety equipment.   Patient does total self care      Ambulation: with mild difficulty     Fall Risk:  Fall Risk Assessment, last 12 mths 1/31/2023   Able to walk? Yes   Fall in past 12 months? 0   Do you feel unsteady? 0   Are you worried about falling 0      Abuse Screen:  Patient is not abused       Cognitive Screening    Has your family/caregiver stated any concerns about your memory: no     Cognitive Screening: Normal - Clock Drawing Test    Health Maintenance Due     Health Maintenance Due   Topic Date Due    Pneumococcal 65+ years (2 - PCV) 06/20/2020    COVID-19 Vaccine (4 - Booster for Moderna series) 05/19/2022    Breast Cancer Screen Mammogram  10/07/2022    Colorectal Cancer Screening Combo  10/13/2022       Patient Care Team   Patient Care Team:  Eunice Littlejohn MD as PCP - General (Internal Medicine Physician)  Eunice Littlejohn MD as PCP - [facility] Empaneled Provider  Paulo West MD (Pulmonary Disease)    History     Patient Active Problem List   Diagnosis Code    Dyslipidemia E78.5    Bronchiectasis Dr Vale Price J47.9    JESUS (obstructive sleep apnea) G47.33    Asthma with COPD ([facility] J44.9    Primary hypertension I10    Gastroesophageal reflux disease without esophagitis K21.9    IGT (impaired glucose tolerance) R73.02    Chronic insomnia F51.04    Severe obesity (BMI 35.0-39. 9) with comorbidity ([facility] E66.01    Rheumatoid arthritis involving multiple sites with positive rheumatoid factor (HCC) M05.79     Past Medical History:   Diagnosis Date    Agatston CAC score, <100 02/2022    ca score 16    Allergic rhinitis     Dr. Micha Avila on immunotherapy    Asthma with COPD ([facility] [address] [facility]     FEV1/FVC 64%, preFEV1 51%, postFEV1 57%, %, DLCO 96% (2/15 - Dr Vale Price) ; daliresp oitito past dc'ed due to cost    Bronchiectasis [facility])     since childhood, idiopathic; CF negx2, A1AT neg, IgE nl, aspergillus nAb neg, sputum AFB neg, PPD neg; Dr José Luis Hull adenoma 03/2012    Dr. Sharmin Moore 3/12; 10/13/17 polyp    Depression 04/2015    PHQ-9 was 11/27, DANNY-7 was 5/21; did well on lexapro    Dyslipidemia     calculated 10 year risk score was 2.5% (4/15), 3.9% (10/15); 5% (11/16); 5.9% (6/19); 6.8% (6/21); 8.3% (1/22)    GERD (gastroesophageal reflux disease) 05/1994    on egd    H/O echocardiogram     nl lv, ef 55%, nl wma, mild mr (2/15) [address]    Hypertension     IGT (impaired glucose tolerance)     Insomnia     no response to trazodone, ambien, restoril    Lipoma of right axilla 08/2022    Dr Darius Hanson s/p resection    Meralgia paresthetica of left side 07/31/2017    Morbid obesity with BMI of 40.0-44.9, adult ([facility]     peak weight 231 lbs, bmi 42.2 from 7/13; qsymia trial 4/14; declined med supervised wt loss, bariatrics w Dr Dereck Rivera; IF 4/18    JESUS on CPAP 10/2012    Dr Kait Rosas; AHI 15, RDI 15, lowest sat 84%, CPAP 15CWP; now seeing Dr Dahlia Lennon    Pseudomonas respiratory infection 2015    Dr Nicholas Yadav    Rheumatoid arthritis ([facility] 06/11/2020    Dr Laila Pascal; ccp/ra+; MTX, cimzia; now Dr Maged Rico      Past Surgical History:   Procedure Laterality Date    BRONCHOSCOPY  1987    HX CATARACT REMOVAL Bilateral 2017    Dr Dominick Leonard (3/12) adenoma; (10/13/17) polyp    HX HEENT  03/2017    Sinus Surgery X2 (3/17), (6/12);  Dr Kacy Perez  98/8230    umbilical Dr Matthew Montejo Left 06/2014    Dr Emily Bill  07/2006    s/p JOSE Lobectomy Dr. Contreras [place] 2006    [address]  02/2015    holter negative    [place] UNLISTED PROCEDURE LUNGS & PLEURA  03/2017    lung biopsy [facility]     Current Outpatient Medications   Medication Sig Dispense Refill    budesonide-formoteroL (SYMBICORT) 160-4.5 mcg/actuation HFAA Budesonide-Formoterol HFA Inhaler 160 mcg-4.5 mcg/actuation        active      mupirocin (BACTROBAN) 2 % ointment USE A ONE-HALF INCH RIBBON DAILY AS DIRECTED      cefepime (MAXIPIME) 2 gram injection       Breztri Aerosphere 160-9-4.8 mcg/actuation HFAA TAKE 2 PUFFS INHALED BY MOUTH 2 (TWO) TIMES A DAY. roflumilast (Daliresp) 500 mcg tab tablet Take 500 mcg by mouth daily. amLODIPine (NORVASC) 10 mg tablet Take 1 Tablet by mouth daily. 90 Tablet 3    predniSONE (DELTASONE) 10 mg tablet       folic acid (FOLVITE) 1 mg tablet Take 1 mg by mouth daily. predniSONE (DELTASONE) 5 mg tablet Take 5 mg by mouth daily. albuterol (PROVENTIL VENTOLIN) 2.5 mg /3 mL (0.083 %) nebulizer solution 2.5 mg.      albuterol (PROVENTIL HFA, VENTOLIN HFA, PROAIR HFA) 90 mcg/actuation inhaler 2 Puffs every six (6) hours as needed. cpap machine kit by Does Not Apply route. CPAP at setting of 15 CWP with ramp of 20 minutes with humidifier. Mask: Mirage Quattro FX small size. Length of need 99 months. Replace mask and accessories as needed times 12 months. Download data at 45 days and fax at [phone]. 1 Kit 0    atorvastatin (LIPITOR) 10 mg tablet TAKE 1 TABLET BY MOUTH EVERY DAY (Patient not taking: Reported on 1/31/2023) 90 Tablet 3    methotrexate (RHEUMATREX) 2.5 mg tablet Take 25 mg by mouth every seven (7) days. (Patient not taking: Reported on 1/31/2023)       Allergies   Allergen Reactions    Accolate [Zafirlukast] Other (comments)     Headache?     Lisinopril Cough    Pollen Extracts Cough     Other reaction(s): wheezing/sob  Other reaction(s): wheezing/sob    Soma [Carisoprodol] Other (comments)     headache       Family History   Problem Relation Age of Onset    Hypertension Mother     Elevated Lipids Mother     Cancer Father     Colon Polyps Father     OSTEOARTHRITIS Father     Crohn's Disease Sister     Migraines Brother      Social History     Tobacco Use    Smoking status: Never    Smokeless tobacco: Never   Substance Use Topics    Alcohol use: No         Halima Galvan MD

## 2023-01-27 NOTE — PROGRESS NOTES
Ana Hood presents today for   Chief Complaint   Patient presents with    Annual Wellness Visit    Labs     1-25-23    Hypertension     Primary    Asthma    COPD    Arthritis     Rheumatoid arthritis    Cholesterol Problem     Dyslipidemia     1. \"Have you been to the ER, urgent care clinic since your last visit? Hospitalized since your last visit? \" no    2. \"Have you seen or consulted any other health care providers outside of the [address] [place] [address] since your last visit? \" no     3. For patients aged 39-70: Has the patient had a colonoscopy / FIT/ Cologuard? No      If the patient is female:    4. For patients aged 41-77: Has the patient had a mammogram within the past 2 years? No  Ordered 1-28-22    5. For patients aged 21-65: Has the patient had a pap smear?  NA - based on age or sex

## 2023-01-31 ENCOUNTER — TELEPHONE (OUTPATIENT)
Dept: INTERNAL MEDICINE CLINIC | Age: 68
End: 2023-01-31

## 2023-01-31 ENCOUNTER — OFFICE VISIT (OUTPATIENT)
Dept: INTERNAL MEDICINE CLINIC | Age: 68
End: 2023-01-31
Payer: MEDICARE

## 2023-01-31 VITALS
DIASTOLIC BLOOD PRESSURE: 58 MMHG | HEIGHT: 62 IN | BODY MASS INDEX: 38.09 KG/M2 | TEMPERATURE: 97.3 F | RESPIRATION RATE: 16 BRPM | HEART RATE: 75 BPM | SYSTOLIC BLOOD PRESSURE: 128 MMHG | WEIGHT: 207 LBS | OXYGEN SATURATION: 94 %

## 2023-01-31 DIAGNOSIS — E78.5 DYSLIPIDEMIA: ICD-10-CM

## 2023-01-31 DIAGNOSIS — E66.01 SEVERE OBESITY (BMI 35.0-39.9) WITH COMORBIDITY (HCC): ICD-10-CM

## 2023-01-31 DIAGNOSIS — I10 PRIMARY HYPERTENSION: ICD-10-CM

## 2023-01-31 DIAGNOSIS — J47.9 BRONCHIECTASIS WITHOUT COMPLICATION (HCC): ICD-10-CM

## 2023-01-31 DIAGNOSIS — Z71.89 ADVANCED DIRECTIVES, COUNSELING/DISCUSSION: ICD-10-CM

## 2023-01-31 DIAGNOSIS — M05.79 RHEUMATOID ARTHRITIS INVOLVING MULTIPLE SITES WITH POSITIVE RHEUMATOID FACTOR (HCC): ICD-10-CM

## 2023-01-31 DIAGNOSIS — J44.9 ASTHMA WITH COPD (HCC): ICD-10-CM

## 2023-01-31 DIAGNOSIS — Z12.11 SCREEN FOR COLON CANCER: ICD-10-CM

## 2023-01-31 DIAGNOSIS — Z00.00 MEDICARE ANNUAL WELLNESS VISIT, SUBSEQUENT: Primary | ICD-10-CM

## 2023-01-31 DIAGNOSIS — Z12.31 ENCOUNTER FOR SCREENING MAMMOGRAM FOR MALIGNANT NEOPLASM OF BREAST: ICD-10-CM

## 2023-01-31 DIAGNOSIS — G47.33 OSA (OBSTRUCTIVE SLEEP APNEA): ICD-10-CM

## 2023-01-31 DIAGNOSIS — R73.02 IGT (IMPAIRED GLUCOSE TOLERANCE): ICD-10-CM

## 2023-01-31 PROCEDURE — G0463 HOSPITAL OUTPT CLINIC VISIT: HCPCS | Performed by: INTERNAL MEDICINE

## 2023-01-31 PROCEDURE — G8417 CALC BMI ABV UP PARAM F/U: HCPCS | Performed by: INTERNAL MEDICINE

## 2023-01-31 PROCEDURE — G8399 PT W/DXA RESULTS DOCUMENT: HCPCS | Performed by: INTERNAL MEDICINE

## 2023-01-31 PROCEDURE — 3017F COLORECTAL CA SCREEN DOC REV: CPT | Performed by: INTERNAL MEDICINE

## 2023-01-31 PROCEDURE — G9899 SCRN MAM PERF RSLTS DOC: HCPCS | Performed by: INTERNAL MEDICINE

## 2023-01-31 PROCEDURE — G8536 NO DOC ELDER MAL SCRN: HCPCS | Performed by: INTERNAL MEDICINE

## 2023-01-31 PROCEDURE — 3074F SYST BP LT 130 MM HG: CPT | Performed by: INTERNAL MEDICINE

## 2023-01-31 PROCEDURE — 99497 ADVNCD CARE PLAN 30 MIN: CPT | Performed by: INTERNAL MEDICINE

## 2023-01-31 PROCEDURE — 3078F DIAST BP <80 MM HG: CPT | Performed by: INTERNAL MEDICINE

## 2023-01-31 PROCEDURE — 1123F ACP DISCUSS/DSCN MKR DOCD: CPT | Performed by: INTERNAL MEDICINE

## 2023-01-31 PROCEDURE — 1101F PT FALLS ASSESS-DOCD LE1/YR: CPT | Performed by: INTERNAL MEDICINE

## 2023-01-31 PROCEDURE — 1090F PRES/ABSN URINE INCON ASSESS: CPT | Performed by: INTERNAL MEDICINE

## 2023-01-31 PROCEDURE — G8427 DOCREV CUR MEDS BY ELIG CLIN: HCPCS | Performed by: INTERNAL MEDICINE

## 2023-01-31 PROCEDURE — G0439 PPPS, SUBSEQ VISIT: HCPCS | Performed by: INTERNAL MEDICINE

## 2023-01-31 PROCEDURE — G8510 SCR DEP NEG, NO PLAN REQD: HCPCS | Performed by: INTERNAL MEDICINE

## 2023-01-31 PROCEDURE — 99214 OFFICE O/P EST MOD 30 MIN: CPT | Performed by: INTERNAL MEDICINE

## 2023-01-31 RX ORDER — CEFEPIME HYDROCHLORIDE 2 G/1
INJECTION, POWDER, FOR SOLUTION INTRAVENOUS
COMMUNITY
Start: 2022-12-21

## 2023-01-31 RX ORDER — ROFLUMILAST 500 UG/1
500 TABLET ORAL DAILY
COMMUNITY

## 2023-01-31 RX ORDER — AMLODIPINE BESYLATE 10 MG/1
10 TABLET ORAL DAILY
Qty: 90 TABLET | Refills: 3 | Status: SHIPPED | OUTPATIENT
Start: 2023-01-31

## 2023-01-31 RX ORDER — BUDESONIDE, GLYCOPYRROLATE, AND FORMOTEROL FUMARATE 160; 9; 4.8 UG/1; UG/1; UG/1
AEROSOL, METERED RESPIRATORY (INHALATION)
COMMUNITY
Start: 2023-01-05

## 2023-01-31 RX ORDER — BUDESONIDE AND FORMOTEROL FUMARATE DIHYDRATE 160; 4.5 UG/1; UG/1
AEROSOL RESPIRATORY (INHALATION)
COMMUNITY

## 2023-01-31 RX ORDER — MUPIROCIN 20 MG/G
OINTMENT TOPICAL
COMMUNITY
Start: 2022-11-17

## 2023-01-31 NOTE — TELEPHONE ENCOUNTER
Pt asking a mammo order be mailed to her.  She goes to a non [facility] location    No issues just a yearly

## 2023-02-01 NOTE — PATIENT INSTRUCTIONS
Medicare Wellness Visit, Female     The best way to live healthy is to have a lifestyle where you eat a well-balanced diet, exercise regularly, limit alcohol use, and quit all forms of tobacco/nicotine, if applicable. Regular preventive services are another way to keep healthy. Preventive services (vaccines, screening tests, monitoring & exams) can help personalize your care plan, which helps you manage your own care. Screening tests can find health problems at the earliest stages, when they are easiest to treat. [facility] follows the current, evidence-based guidelines published by the [facility] ([facility]) when recommending preventive services for our patients. Because we follow these guidelines, sometimes recommendations change over time as research supports it. (For example, mammograms used to be recommended annually. Even though Medicare will still pay for an annual mammogram, the newer guidelines recommend a mammogram every two years for women of average risk). Of course, you and your doctor may decide to screen more often for some diseases, based on your risk and your co-morbidities (chronic disease you are already diagnosed with). Preventive services for you include:  - Medicare offers their members a free annual wellness visit, which is time for you and your primary care provider to discuss and plan for your preventive service needs.  Take advantage of this benefit every year!    -Over the age of 72 should receive the recommended pneumonia vaccines.    -All adults should have a flu vaccine yearly.  -All adults should have a tetanus vaccine every 10 years.   -Over the age 48 should receive the shingles vaccines.        -All adults should be screened once for Hepatitis C.  -All adults age 38-68 who are overweight should have a diabetes screening test once every three years.   -Other screening tests and preventive services for persons with diabetes include: an eye exam to screen for diabetic retinopathy, a kidney function test, a foot exam, and stricter control over your cholesterol.   -Cardiovascular screening for adults with routine risk involves an electrocardiogram (ECG) at intervals determined by your doctor.     -Colorectal cancer screenings should be done for adults age 39-70 with no increased risk factors for colorectal cancer. There are a number of acceptable methods of screening for this type of cancer. Each test has its own benefits and drawbacks. Discuss with your doctor what is most appropriate for you during your annual wellness visit. The different tests include: colonoscopy (considered the best screening method), a fecal occult blood test, a fecal DNA test, and sigmoidoscopy.    -Lung cancer screening is recommended annually with a low dose CT scan for adults between age 54 and 68, who have smoked at least 30 pack years (equivalent of 1 pack per day for 30 days), and who is a current smoker or quit less than 15 years ago.    -A bone mass density test is recommended when a woman turns 65 to screen for osteoporosis. This test is only recommended one time, as a screening. Some providers will use this same test as a disease monitoring tool if you already have osteoporosis. -Breast cancer screenings are recommended every other year for women of normal risk, age 54-69.    -Cervical cancer screenings for women over age 72 are only recommended with certain risk factors.      Here is a list of your current Health Maintenance items (your personalized list of preventive services) with a due date:  Health Maintenance Due   Topic Date Due    Pneumococcal Vaccine (2 - PCV) 06/20/2020    COVID-19 Vaccine (4 - Booster for Moderna series) 05/19/2022    Mammogram  10/07/2022    Colorectal Screening  10/13/2022

## 2023-02-01 NOTE — ACP (ADVANCE CARE PLANNING)
Advance Care Planning     Advance Care Planning (ACP) Physician/NP/PA Conversation      Date of Conversation: 1/31/2023  Conducted with: Patient with Decision Making Capacity    Healthcare Decision Maker:     Primary Decision Maker: Steve Mckinney - Spouse - [phone]  Click here to complete [employer] including selection of the Healthcare Decision Maker Relationship (ie \"Primary\")      Today we documented Decision Maker(s) consistent with Legal Next of Kin hierarchy. Care Preferences:    Hospitalization: \"If your health worsens and it becomes clear that your chance of recovery is unlikely, what would be your preference regarding hospitalization? \"  The patient would prefer hospitalization. Ventilation: \"If you were unable to breathe on your own and your chance of recovery was unlikely, what would be your preference about the use of a ventilator (breathing machine) if it was available to you? \"   The patient would desire the use of a ventilator. Resuscitation: \"In the event your heart stopped as a result of an underlying serious health condition, would you want attempts to be made to restart your heart, or would you prefer a natural death? \"   Yes, attempt to resuscitate.     Additional topics discussed: ventilation preferences, hospitalization preferences, and resuscitation preferences    Conversation Outcomes / Follow-Up Plan:   ACP in process - information provided, considering goals and options  Reviewed DNR/DNI and patient elects Full Code (Attempt Resuscitation)     Length of Voluntary ACP Conversation in minutes:  16 minutes    Dominick Collins MD

## 2023-02-04 DIAGNOSIS — Z12.31 ENCOUNTER FOR SCREENING MAMMOGRAM FOR MALIGNANT NEOPLASM OF BREAST: ICD-10-CM

## 2023-02-05 DIAGNOSIS — E78.5 DYSLIPIDEMIA: Primary | ICD-10-CM

## 2023-02-05 DIAGNOSIS — R73.02 IGT (IMPAIRED GLUCOSE TOLERANCE): Primary | ICD-10-CM

## 2023-02-09 ENCOUNTER — DOCUMENTATION ONLY (OUTPATIENT)
Dept: INTERNAL MEDICINE CLINIC | Age: 68
End: 2023-02-09

## 2023-02-09 NOTE — PROGRESS NOTES
Fax received from [facility] that patient refused appointment for colonoscopy.

## 2023-04-25 ENCOUNTER — HOSPITAL ENCOUNTER (OUTPATIENT)
Facility: HOSPITAL | Age: 68
Discharge: HOME | End: 2023-04-28
Payer: MEDICARE

## 2023-04-25 DIAGNOSIS — Z12.31 SCREENING MAMMOGRAM FOR HIGH-RISK PATIENT: ICD-10-CM

## 2023-04-25 PROCEDURE — 77063 BREAST TOMOSYNTHESIS BI: CPT

## 2023-05-23 ENCOUNTER — OFFICE VISIT (OUTPATIENT)
Age: 68
End: 2023-05-23
Payer: MEDICARE

## 2023-05-23 VITALS
HEART RATE: 66 BPM | OXYGEN SATURATION: 92 % | RESPIRATION RATE: 18 BRPM | BODY MASS INDEX: 36.25 KG/M2 | WEIGHT: 197 LBS | HEIGHT: 62 IN | SYSTOLIC BLOOD PRESSURE: 122 MMHG | TEMPERATURE: 98.2 F | DIASTOLIC BLOOD PRESSURE: 68 MMHG

## 2023-05-23 DIAGNOSIS — R10.12 LEFT UPPER QUADRANT ABDOMINAL PAIN: Primary | ICD-10-CM

## 2023-05-23 PROCEDURE — 99211 OFF/OP EST MAY X REQ PHY/QHP: CPT | Performed by: INTERNAL MEDICINE

## 2023-05-23 PROCEDURE — G8417 CALC BMI ABV UP PARAM F/U: HCPCS | Performed by: INTERNAL MEDICINE

## 2023-05-23 PROCEDURE — 3074F SYST BP LT 130 MM HG: CPT | Performed by: INTERNAL MEDICINE

## 2023-05-23 PROCEDURE — 1123F ACP DISCUSS/DSCN MKR DOCD: CPT | Performed by: INTERNAL MEDICINE

## 2023-05-23 PROCEDURE — 1036F TOBACCO NON-USER: CPT | Performed by: INTERNAL MEDICINE

## 2023-05-23 PROCEDURE — 3078F DIAST BP <80 MM HG: CPT | Performed by: INTERNAL MEDICINE

## 2023-05-23 PROCEDURE — 1090F PRES/ABSN URINE INCON ASSESS: CPT | Performed by: INTERNAL MEDICINE

## 2023-05-23 PROCEDURE — G8427 DOCREV CUR MEDS BY ELIG CLIN: HCPCS | Performed by: INTERNAL MEDICINE

## 2023-05-23 PROCEDURE — 99213 OFFICE O/P EST LOW 20 MIN: CPT | Performed by: INTERNAL MEDICINE

## 2023-05-23 PROCEDURE — G8399 PT W/DXA RESULTS DOCUMENT: HCPCS | Performed by: INTERNAL MEDICINE

## 2023-05-23 PROCEDURE — 3017F COLORECTAL CA SCREEN DOC REV: CPT | Performed by: INTERNAL MEDICINE

## 2023-05-23 RX ORDER — LEFLUNOMIDE 20 MG/1
20 TABLET ORAL DAILY
COMMUNITY

## 2023-05-23 RX ORDER — ROFLUMILAST 500 UG/1
500 TABLET ORAL DAILY
COMMUNITY

## 2023-05-23 RX ORDER — BUDESONIDE, GLYCOPYRROLATE, AND FORMOTEROL FUMARATE 160; 9; 4.8 UG/1; UG/1; UG/1
2 AEROSOL, METERED RESPIRATORY (INHALATION) 2 TIMES DAILY
COMMUNITY

## 2023-05-23 ASSESSMENT — PATIENT HEALTH QUESTIONNAIRE - PHQ9
1. LITTLE INTEREST OR PLEASURE IN DOING THINGS: 0
SUM OF ALL RESPONSES TO PHQ9 QUESTIONS 1 & 2: 0
SUM OF ALL RESPONSES TO PHQ QUESTIONS 1-9: 0
2. FEELING DOWN, DEPRESSED OR HOPELESS: 0
SUM OF ALL RESPONSES TO PHQ QUESTIONS 1-9: 0

## 2023-05-23 ASSESSMENT — ENCOUNTER SYMPTOMS: ABDOMINAL PAIN: 1

## 2023-05-23 NOTE — PROGRESS NOTES
Chika Robertson presents today for No chief complaint on file. 1. \"Have you been to the ER, urgent care clinic since your last visit? Hospitalized since your last visit? \" no    2. \"Have you seen or consulted any other health care providers outside of the [address] [place] [address] since your last visit? \" no     3. For patients aged 39-70: Has the patient had a colonoscopy / FIT/ Cologuard? Yes - Care Gap present. Rooming MA/LPN to request most recent results      If the patient is female:    4. For patients aged 41-77: Has the patient had a mammogram within the past 2 years? NA - based on age or sex      11. For patients aged 21-65: Has the patient had a pap smear?  NA - based on age or sex

## 2023-05-24 NOTE — PROGRESS NOTES
Danish Dubois (: 1955) is a 78 y.o. female, here for evaluation of the following chief complaint(s):  Abdominal Pain (Left side pain with nausea x 2 weeks)       ASSESSMENT/PLAN:  Below is the assessment and plan developed based on review of pertinent history, physical exam, labs, studies, and medications. 1. Left upper quadrant abdominal pain  Assessment & Plan:   May take Tylenol for fever/pain  Orders:  -     CBC with Auto Differential; Future  -     Comprehensive Metabolic Panel; Future  -     Lipase; Future  -     Urinalysis; Future  -     CT ABDOMEN PELVIS W IV CONTRAST Additional Contrast? Oral; Future      Return if symptoms worsen or fail to improve, for LABS TODAY. SUBJECTIVE/OBJECTIVE:  Abdominal Pain    Patient is complaining of pain abdomen left upper abdomen for last 2 weeks. Patient had mild nausea nausea, no vomiting. Patient moving her bowels fine. Patient has experienced reduced appetite, fatigue and weakness no problem passing urine. No dysuria. Patient had mild fever yesterday. .      Patient also has upper respiratory symptoms with some sinus congestion. Patient is following with pulmonary for bronchiectasis. Patient had seen GI for GERD. Patient is on methotrexate and prednisone by rheumatology. ROS as above  Vitals:    23 1549   BP: 122/68   Pulse: 66   Resp: 18   Temp: 98.2 °F (36.8 °C)   TempSrc: Temporal   SpO2: 92%   Weight: 197 lb (89.4 kg)   Height: 5' 2\" (1.575 m)     Physical Exam  Constitutional:       Appearance: Normal appearance. Comments: On 2 L oxygen by nasal cannula   HENT:      Head: Normocephalic and atraumatic. Nose: Nose normal.      Mouth/Throat:      Mouth: Mucous membranes are moist.   Eyes:      Extraocular Movements: Extraocular movements intact. Pupils: Pupils are equal, round, and reactive to light. Cardiovascular:      Rate and Rhythm: Normal rate and regular rhythm. Heart sounds: Normal heart sounds.

## 2023-05-25 ENCOUNTER — TELEPHONE (OUTPATIENT)
Age: 68
End: 2023-05-25

## 2023-05-25 NOTE — TELEPHONE ENCOUNTER
FYI-Pt was seen on 05/23 by Dr Tamiko Galvan ,   She said she is in hospital ([place])   and not able to get the test he ordered anytime soon

## 2023-05-31 ENCOUNTER — CARE COORDINATION (OUTPATIENT)
Facility: CLINIC | Age: 68
End: 2023-05-31

## 2023-05-31 NOTE — CARE COORDINATION
[facility] Care Transitions Initial Follow Up Call    Call within 2 business days of discharge: Yes    Care Transition Nurse contacted the patient by telephone to perform post hospital discharge assessment. Verified name and  with patient as identifiers. Provided introduction to self, and explanation of the Care Transition Nurse role. Patient: Rhett Garcia Patient : 1955   MRN: [id number]  Reason for Admission: Community Acquired Pneumonia. Discharge Date:   RARS: No data recorded    Was this an external facility discharge? Yes, 23- 23  Discharge Facility: [facility] for Community Acquired Pneumonia. Challenges to be reviewed by the provider   Additional needs identified to be addressed with provider: No  none               Method of communication with provider: none. Patient reports that she is doing well. Pt. 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 CTN offer PCP appt., Pt. Politely declined and states that she doesn't think it's unnecessary at this time. Pt. Reports that she has an appt to see her Pulmonology Dr. In two weeks. When CTN was asking Pt. If she takes her albuterol inhaler every 6 hrs as needed or 4 hrs as needed, Pt. States that she takes albuterol inhaler when she goes outside such PCP appt. Etc. Pt. States that she uses Albuterol nebulizer when she is at home. Pt. States that she just got out of the hospital and CTN is creating more confusion with her medications. No questions, concerns and/or needs verbalized by Pt. At this time. Pt. Kept the conversation short and concluded the call.      Follow Up  Future Appointments   Date Time Provider Keke Dumont   2024  1:00 PM [facility] LAB VISIT [facility] BS AMB   2024  1:20 PM Laverne Dowd MD [facility] BS AMB       Johana Barnett RN

## 2023-06-01 ENCOUNTER — TELEPHONE (OUTPATIENT)
Age: 68
End: 2023-06-01

## 2023-06-01 NOTE — TELEPHONE ENCOUNTER
Amber lester from [facility], would like to know if the dr is willing to sign off on the patients plan of care orders. She all so stated that the hospital discharge her today withordrs to have labs drawn and the patient refused. Please Advise.     Please contact her   (Mrs. Farley Mon you have any questions at [phone]

## 2023-06-07 ENCOUNTER — CARE COORDINATION (OUTPATIENT)
Facility: CLINIC | Age: 68
End: 2023-06-07

## 2023-06-07 NOTE — CARE COORDINATION
[facility] Care Transitions Follow Up Call      Care Transition Nurse contacted the patient by telephone for care transition follow up. Verified name and  with patient as identifiers. Patient: Adriana Fonseca  Patient : 1955   MRN: [id number]    Discharge Date:   RARS: No data recorded    Needs to be reviewed by the provider   Additional needs identified to be addressed with provider: No  none             Method of communication with provider: none. Patient reported that she is doing well. Pt. Denied CP, fever and chills at this time. Pt. States her Shortness is at baseline. No questions, concerns and/or needs at this time as per Pt. Pt. Reports that she knows red flag to ED. Pt. Strongly Declined future follow up calls. Pt. Kept the conversation short and concluded the call. This episode is closed and resolved.      Follow Up  Future Appointments   Date Time Provider Keke Dumont   2024  1:00 PM [facility] LAB VISIT [facility] BS JARROD   2024  1:20 PM Sobeida Dang MD [facility] BS JARROD Astorga RN

## 2023-06-23 ENCOUNTER — CARE COORDINATION (OUTPATIENT)
Facility: CLINIC | Age: 68
End: 2023-06-23

## 2023-06-23 RX ORDER — AZITHROMYCIN 500 MG/1
500 TABLET, FILM COATED ORAL
COMMUNITY
Start: 2023-06-23

## 2023-06-23 RX ORDER — LOSARTAN POTASSIUM 50 MG/1
50 TABLET ORAL DAILY
COMMUNITY
Start: 2023-06-23

## 2023-06-23 RX ORDER — OMEPRAZOLE 40 MG/1
40 CAPSULE, DELAYED RELEASE ORAL 2 TIMES DAILY
COMMUNITY
Start: 2023-06-22

## 2023-06-23 RX ORDER — TOBRAMYCIN INHALATION SOLUTION 300 MG/5ML
80 INHALANT RESPIRATORY (INHALATION) SEE ADMIN INSTRUCTIONS
COMMUNITY
Start: 2023-06-22

## 2023-06-23 RX ORDER — LORAZEPAM 0.5 MG/1
.25-.5 TABLET ORAL EVERY 8 HOURS PRN
COMMUNITY
Start: 2023-06-22

## 2023-06-23 RX ORDER — PIPERACILLIN SODIUM, TAZOBACTAM SODIUM 2; .25 G/10ML; G/10ML
4500 INJECTION, POWDER, LYOPHILIZED, FOR SOLUTION INTRAVENOUS EVERY 6 HOURS
Qty: 64 EACH | Refills: 0 | COMMUNITY
Start: 2023-06-21 | End: 2023-06-29

## 2023-06-23 RX ORDER — SODIUM CHLORIDE FOR INHALATION 3 %
3 VIAL, NEBULIZER (ML) INHALATION EVERY 6 HOURS
COMMUNITY

## 2023-06-26 SDOH — ECONOMIC STABILITY: INCOME INSECURITY: HOW HARD IS IT FOR YOU TO PAY FOR THE VERY BASICS LIKE FOOD, HOUSING, MEDICAL CARE, AND HEATING?: PATIENT DECLINED

## 2023-06-26 SDOH — ECONOMIC STABILITY: FOOD INSECURITY: WITHIN THE PAST 12 MONTHS, THE FOOD YOU BOUGHT JUST DIDN'T LAST AND YOU DIDN'T HAVE MONEY TO GET MORE.: PATIENT DECLINED

## 2023-06-26 SDOH — ECONOMIC STABILITY: HOUSING INSECURITY
IN THE LAST 12 MONTHS, WAS THERE A TIME WHEN YOU DID NOT HAVE A STEADY PLACE TO SLEEP OR SLEPT IN A SHELTER (INCLUDING NOW)?: PATIENT REFUSED

## 2023-06-26 SDOH — ECONOMIC STABILITY: TRANSPORTATION INSECURITY
IN THE PAST 12 MONTHS, HAS LACK OF TRANSPORTATION KEPT YOU FROM MEETINGS, WORK, OR FROM GETTING THINGS NEEDED FOR DAILY LIVING?: PATIENT DECLINED

## 2023-06-26 SDOH — ECONOMIC STABILITY: FOOD INSECURITY: WITHIN THE PAST 12 MONTHS, YOU WORRIED THAT YOUR FOOD WOULD RUN OUT BEFORE YOU GOT MONEY TO BUY MORE.: PATIENT DECLINED

## 2023-06-27 ENCOUNTER — CARE COORDINATION (OUTPATIENT)
Facility: CLINIC | Age: 68
End: 2023-06-27

## 2023-06-28 ENCOUNTER — OFFICE VISIT (OUTPATIENT)
Age: 68
End: 2023-06-28

## 2023-06-28 VITALS
DIASTOLIC BLOOD PRESSURE: 62 MMHG | OXYGEN SATURATION: 92 % | HEART RATE: 80 BPM | BODY MASS INDEX: 35.7 KG/M2 | WEIGHT: 194 LBS | SYSTOLIC BLOOD PRESSURE: 121 MMHG | RESPIRATION RATE: 18 BRPM | HEIGHT: 62 IN | TEMPERATURE: 97.6 F

## 2023-06-28 DIAGNOSIS — F41.9 ANXIETY: ICD-10-CM

## 2023-06-28 DIAGNOSIS — M05.79 RHEUMATOID ARTHRITIS INVOLVING MULTIPLE SITES WITH POSITIVE RHEUMATOID FACTOR (HCC): ICD-10-CM

## 2023-06-28 DIAGNOSIS — J44.9 ASTHMA WITH COPD (HCC): ICD-10-CM

## 2023-06-28 DIAGNOSIS — R73.02 IGT (IMPAIRED GLUCOSE TOLERANCE): ICD-10-CM

## 2023-06-28 DIAGNOSIS — I82.609 VENOUS THROMBOSIS OF UPPER EXTREMITY: ICD-10-CM

## 2023-06-28 DIAGNOSIS — I10 PRIMARY HYPERTENSION: ICD-10-CM

## 2023-06-28 DIAGNOSIS — J47.1 BRONCHIECTASIS WITH ACUTE EXACERBATION (HCC): Primary | ICD-10-CM

## 2023-06-28 RX ORDER — TEMAZEPAM 30 MG/1
CAPSULE ORAL
COMMUNITY

## 2023-06-28 RX ORDER — RIVAROXABAN 15 MG-20MG
KIT ORAL
COMMUNITY
Start: 2023-06-27

## 2023-06-28 ASSESSMENT — PATIENT HEALTH QUESTIONNAIRE - PHQ9
SUM OF ALL RESPONSES TO PHQ9 QUESTIONS 1 & 2: 0
2. FEELING DOWN, DEPRESSED OR HOPELESS: 0
SUM OF ALL RESPONSES TO PHQ QUESTIONS 1-9: 0
1. LITTLE INTEREST OR PLEASURE IN DOING THINGS: 0
SUM OF ALL RESPONSES TO PHQ QUESTIONS 1-9: 0

## 2023-06-30 ENCOUNTER — CARE COORDINATION (OUTPATIENT)
Facility: CLINIC | Age: 68
End: 2023-06-30

## 2023-07-03 ENCOUNTER — TELEPHONE (OUTPATIENT)
Age: 68
End: 2023-07-03

## 2023-07-05 ENCOUNTER — TELEPHONE (OUTPATIENT)
Age: 68
End: 2023-07-05

## 2023-07-05 DIAGNOSIS — E87.6 HYPOKALEMIA: Primary | ICD-10-CM

## 2023-07-05 PROBLEM — R10.12 LEFT UPPER QUADRANT ABDOMINAL PAIN: Status: RESOLVED | Noted: 2023-05-23 | Resolved: 2023-07-05

## 2023-07-05 RX ORDER — LORAZEPAM 0.5 MG/1
0.5 TABLET ORAL 2 TIMES DAILY PRN
Qty: 40 TABLET | Refills: 0 | Status: SHIPPED | OUTPATIENT
Start: 2023-07-05 | End: 2023-08-04

## 2023-07-05 RX ORDER — LOSARTAN POTASSIUM 50 MG/1
50 TABLET ORAL DAILY
Qty: 90 TABLET | Refills: 1 | Status: SHIPPED | OUTPATIENT
Start: 2023-07-05

## 2023-07-05 RX ORDER — POTASSIUM CHLORIDE 750 MG/1
10 TABLET, EXTENDED RELEASE ORAL DAILY
Qty: 30 TABLET | Refills: 0 | Status: SHIPPED | OUTPATIENT
Start: 2023-07-05

## 2023-07-05 NOTE — PROGRESS NOTES
minimal wheezing and rhonchi heard  Heart showed rrr, no s3  Abd soft and nt  Ext with trace ankle edema. No LUE tenderness    Assessment and plan:  1. Bronchiectasis, cx+ pseudomonas. Abx mgmt per Dr Christophe Celestin  2. Asthma. Per Dr Governor Vargas  3. HTN. Continue to losaratan and recheck labs in future  4. Anxiety. Declined maintenance med, requested ativan which she will use judiciously  5. LUE dvt. Finish out 3 mos xarelto  6.  RA. Per Dr Author Nava          Above conditions discussed at length and patient vocalized understanding. All questions answered to patient satisfaction     Diagnosis Orders   1. Bronchiectasis with acute exacerbation ([address])        2. Asthma with COPD ([address])        3. Venous thrombosis of upper extremity  rivaroxaban (XARELTO) 20 MG TABS tablet      4. Anxiety  LORazepam (ATIVAN) 0.5 MG tablet      5. Primary hypertension  losartan (COZAAR) 50 MG tablet      6. IGT (impaired glucose tolerance)        7.  Rheumatoid arthritis involving multiple sites with positive rheumatoid factor ([address])

## 2023-07-05 NOTE — TELEPHONE ENCOUNTER
Dr Doc Goodman sent me labs  K still low  Start k-dur  Recheck labs in 2 weeks - print out and mail order to pt pls

## 2023-07-06 ENCOUNTER — CARE COORDINATION (OUTPATIENT)
Facility: CLINIC | Age: 68
End: 2023-07-06

## 2023-07-06 DIAGNOSIS — I82.90 VENOUS THROMBOSIS: Primary | ICD-10-CM

## 2023-07-06 NOTE — CARE COORDINATION
[facility] Care Transitions Follow Up Call    Patient Current Location:  Home: [address] [address]    Care Transition Nurse contacted the patient by telephone to follow up after admission on 23. Verified name and  with patient as identifiers. Patient: Litzy Magana  Patient : 1955   MRN: [id number]  Reason for Admission: Acute on chronic respiratory failure 2/2 bronchiectasis and Sepsis 2/2 PNA. Evaluated for chronic abdominal pain-US abdomen suggests hepatocellular disease/steatosis  Discharge Date:  23 RARS: No data recorded    Needs to be reviewed by the provider   Additional needs identified to be addressed with provider: Yes    Medications-Xarelto monthly co-pay is very expensive and patient discovered a program that will assist her with applying for savings with Eliquis, if PCP believes Eliquis is a suitable alternative. Please advise if switching to Eliquis is advised, and if so, what dose she would be prescribed. .             Method of communication with provider: chart routing. Patient reports her PICC line was removed yesterday and she was able to take a shower, so she's feeling much better. Addressed changes since last contact:  medications-Xarelto 20 mg daily prescribed by PCP with plan for [facility] for at least 3 months  Was follow up appointment scheduled within 7 days from discharge? Yes  Did patient attend follow up appointment?  Yes, with PCP on 23     Follow Up  Future Appointments   Date Time Provider [address]   2023  2:20 PM Adrian Landa MD [facility] BS AMB   2024  1:00 PM [facility] LAB VISIT [facility] BS AMB   2024  1:20 PM Adrian Landa MD [facility] BS AMB     Non-BS follow up appointment(s):     [facility] Infectious Disease Dr. Lakisha Baez TBFRANCHESKA     Pulmonologist Dr. Dafne Mancilla on 23 @ 1:30 PM    Care Transition Nurse reviewed medical action plan with patient and discussed any barriers to care and/or understanding of plan

## 2023-07-06 NOTE — TELEPHONE ENCOUNTER
Patient contacted and advised. Patient stated someone called and told her about repeat labs but didn't notify her of the medications. Went over medications with patient and answered questions about when to do labs. Patient verbalized understanding. Lab orders [facility] and mailed.

## 2023-07-07 ENCOUNTER — CARE COORDINATION (OUTPATIENT)
Facility: CLINIC | Age: 68
End: 2023-07-07

## 2023-07-07 NOTE — CARE COORDINATION
Change to eliquis 5 bid  Would finish out the 21 day xarelto then switch over the following day to eliquis  Epic not allowing it to be e-prescribed so will print out script  It also says non formulary so not sure how she can get covered unless a coupon can be applied

## 2023-07-07 NOTE — CARE COORDINATION
[facility] Care Transitions Follow Up Call    Patient Current Location:  [facility] Transition Nurse contacted the patient by telephone to follow up after admission on 23. Verified name and  with patient as identifiers. Patient: Beatris Alexander  Patient : 1955   MRN: [id number]  Reason for Admission: Acute on chronic respiratory failure 2/2 bronchiectasis and Sepsis 2/2 PNA. Evaluated for chronic abdominal pain-US abdomen suggests hepatocellular disease/steatosis  Discharge Date: 23   RARS: No data recorded    Needs to be reviewed by the provider   Additional needs identified to be addressed with provider: Yes    Medications-Patient has decided to stay on Xarelto and will be able to pay the co-pay. There was an issue with trying to to get Eliquis, so this will be easier. Method of communication with provider: chart routing. Addressed changes since last contact:  medications-Patient to complete Xarelto starter pack and switch to Eliquis 5 mg BID          Care Transition Nurse reviewed medical action plan with patient and discussed any barriers to care and/or understanding of plan of care after discharge. Discussed appropriate site of care based on symptoms and resources available to patient including: PCP. The patient agrees to contact the PCP office for questions related to their healthcare. Patients top risk factors for readmission:  medical condition-RA, bronchiectasis, admitted to [facility] -23 for sepsis/CAP; new acute LUE DVT  Interventions to address risk factors:  Assistance with medication cost-CTN received response from PCP that patient should complete Xarelto starter pack and can switch to Eliquis 5 mg BID. CTN reviewed info with the patient. She will contact [facility] to obtain next steps and she will notify CTN if she needs further assistance.  CTN received a return call from pt stating [facility] will not be able to assist her due to

## 2023-07-10 ENCOUNTER — TELEPHONE (OUTPATIENT)
Age: 68
End: 2023-07-10

## 2023-07-11 ENCOUNTER — TELEPHONE (OUTPATIENT)
Age: 68
End: 2023-07-11

## 2023-07-11 NOTE — TELEPHONE ENCOUNTER
MAY- pt said she is taking Xarelto  ,because of cost pt will be using getting refills from , [facility] ,she wanted Dr PRITCHARD to know they will be contacting office asking about her dosage .

## 2023-07-11 NOTE — TELEPHONE ENCOUNTER
Second message   Emily Robertson- pt said she is taking Xarelto  ,because of cost pt will be using getting refills from , [facility] ,she wanted Dr PRITCHARD to know they will be contacting office asking about her dosage .

## 2023-07-14 ENCOUNTER — HOSPITAL ENCOUNTER (OUTPATIENT)
Facility: HOSPITAL | Age: 68
End: 2023-07-14
Payer: MEDICARE

## 2023-07-14 DIAGNOSIS — E87.6 HYPOKALEMIA: ICD-10-CM

## 2023-07-14 LAB
ANION GAP SERPL CALC-SCNC: 5 MMOL/L (ref 3–18)
BUN SERPL-MCNC: 11 MG/DL (ref 7–18)
BUN/CREAT SERPL: 17 (ref 12–20)
CALCIUM SERPL-MCNC: 8.7 MG/DL (ref 8.5–10.1)
CHLORIDE SERPL-SCNC: 106 MMOL/L (ref 100–111)
CO2 SERPL-SCNC: 29 MMOL/L (ref 21–32)
CREAT SERPL-MCNC: 0.63 MG/DL (ref 0.6–1.3)
GLUCOSE SERPL-MCNC: 128 MG/DL (ref 74–99)
MAGNESIUM SERPL-MCNC: 2.1 MG/DL (ref 1.6–2.6)
POTASSIUM SERPL-SCNC: 4.3 MMOL/L (ref 3.5–5.5)
SODIUM SERPL-SCNC: 140 MMOL/L (ref 136–145)

## 2023-07-14 PROCEDURE — 83735 ASSAY OF MAGNESIUM: CPT

## 2023-07-14 PROCEDURE — 80048 BASIC METABOLIC PNL TOTAL CA: CPT

## 2023-07-14 PROCEDURE — 36415 COLL VENOUS BLD VENIPUNCTURE: CPT

## 2023-07-18 ENCOUNTER — TELEPHONE (OUTPATIENT)
Age: 68
End: 2023-07-18

## 2023-07-18 DIAGNOSIS — I82.609 VENOUS THROMBOSIS OF UPPER EXTREMITY: ICD-10-CM

## 2023-07-21 ENCOUNTER — TELEPHONE (OUTPATIENT)
Age: 68
End: 2023-07-21

## 2023-07-21 NOTE — TELEPHONE ENCOUNTER
Patient is calling for her lab results. Wants to know if her potassium is within normal limits and if she can stop taking the supplement.

## 2023-07-29 DIAGNOSIS — E87.6 HYPOKALEMIA: ICD-10-CM

## 2023-08-01 RX ORDER — POTASSIUM CHLORIDE 750 MG/1
TABLET, EXTENDED RELEASE ORAL
Qty: 30 TABLET | Refills: 0 | Status: SHIPPED | OUTPATIENT
Start: 2023-08-01

## 2023-08-11 DIAGNOSIS — E87.6 HYPOKALEMIA: ICD-10-CM

## 2023-08-11 NOTE — TELEPHONE ENCOUNTER
Auto refill request from pharmacy patient has no refills on file. They are requesting new RX.     PCP: Rickey Pitt MD    Previous refill per chart  KLOR-CON M10 10 MEQ extended release tablet 30 tablet 0 8/1/2023     Sig: TAKE 1 TABLET BY MOUTH EVERY DAY    Sent to pharmacy as: Klor-Con M10 10 MEQ Oral Tablet Extended Release (potassium chloride)    E-Prescribing Status: Receipt confirmed by pharmacy (8/1/2023  8:48 AM EDT)          Future Appointments   Date Time Provider [address] [place] [address]   9/28/2023  2:20 PM Rickey Pitt MD [facility] BS AMB   1/25/2024  1:00 PM [facility] LAB VISIT [facility] BS AMB   2/1/2024  1:20 PM Rickey Pitt MD [facility] BS AMB

## 2023-08-13 RX ORDER — POTASSIUM CHLORIDE 750 MG/1
10 TABLET, EXTENDED RELEASE ORAL DAILY
Qty: 30 TABLET | Refills: 0 | OUTPATIENT
Start: 2023-08-13

## 2023-08-15 DIAGNOSIS — E87.6 HYPOKALEMIA: ICD-10-CM

## 2023-08-15 RX ORDER — POTASSIUM CHLORIDE 750 MG/1
10 TABLET, EXTENDED RELEASE ORAL DAILY
Qty: 30 TABLET | Refills: 0 | OUTPATIENT
Start: 2023-08-15

## 2023-08-15 NOTE — TELEPHONE ENCOUNTER
PCP:  Mikal Dai MD    Previous refill per chart  KLOR-CON M10 10 MEQ extended release tablet 30 tablet 0 8/1/2023     Sig: TAKE 1 TABLET BY MOUTH EVERY DAY    Sent to pharmacy as: Klor-Con M10 10 MEQ Oral Tablet Extended Release (potassium chloride)    E-Prescribing Status: Receipt confirmed by pharmacy (8/1/2023  8:48 AM EDT)          Future Appointments   Date Time Provider [address] [place] [address]   9/28/2023  2:20 PM Mikal Dai MD [facility] BS AMB   1/25/2024  1:00 PM [facility] LAB VISIT [facility] BS AMB   2/1/2024  1:20 PM Mikal Dai MD [facility] BS AMB

## 2023-09-19 RX ORDER — RIVAROXABAN 20 MG/1
20 TABLET, FILM COATED ORAL
Qty: 30 TABLET | Refills: 2 | Status: SHIPPED | OUTPATIENT
Start: 2023-09-19

## 2023-09-19 NOTE — TELEPHONE ENCOUNTER
PCP:  Rylie Cruz MD    Last refill per chart:    [facility] PACK 15 & 20 MG Starter Pack         Start: 6/27/2023  Ord/Sold: 6/28/2023 (O)  Report  Taking:   Long-term:   Med Dose History  Adjust Sig       Dispense as written       Ordered on: 6/28/2023        Future Appointments   Date Time Provider [address]   9/28/2023  2:20 PM Rylie Cruz MD [facility] BS AMB   1/25/2024  1:00 PM [facility] LAB VISIT [facility]   2/1/2024  1:20 PM Rylie Cruz MD [facility] AMB

## 2023-09-21 ENCOUNTER — TELEPHONE (OUTPATIENT)
Age: 68
End: 2023-09-21

## 2023-09-21 NOTE — TELEPHONE ENCOUNTER
This RN received a call from patient to ask how long she will be on the Xarelto. This RN informed patient that she would send Dr. Hemanth Jackson a message to ask.

## 2023-09-22 ENCOUNTER — TELEPHONE (OUTPATIENT)
Age: 68
End: 2023-09-22

## 2023-09-22 NOTE — TELEPHONE ENCOUNTER
[facility] called about a fax that was sent on 09/13 for medical necessity for oxygen tank

## 2023-09-22 NOTE — TELEPHONE ENCOUNTER
Regarding: No message from patient  ----- Message from Freda Garland MD sent at 9/21/2023  4:08 PM EDT -----    This encounter does not contain a message from the patient.

## 2023-09-27 DIAGNOSIS — K75.81 NASH (NONALCOHOLIC STEATOHEPATITIS): Primary | ICD-10-CM

## 2023-09-28 ENCOUNTER — OFFICE VISIT (OUTPATIENT)
Age: 68
End: 2023-09-28
Payer: MEDICARE

## 2023-09-28 VITALS
HEIGHT: 62 IN | BODY MASS INDEX: 35.7 KG/M2 | OXYGEN SATURATION: 90 % | SYSTOLIC BLOOD PRESSURE: 131 MMHG | HEART RATE: 78 BPM | DIASTOLIC BLOOD PRESSURE: 62 MMHG | WEIGHT: 194 LBS | TEMPERATURE: 96.7 F | RESPIRATION RATE: 16 BRPM

## 2023-09-28 DIAGNOSIS — K21.9 GASTROESOPHAGEAL REFLUX DISEASE WITHOUT ESOPHAGITIS: ICD-10-CM

## 2023-09-28 DIAGNOSIS — J47.9 BRONCHIECTASIS WITHOUT COMPLICATION (HCC): ICD-10-CM

## 2023-09-28 DIAGNOSIS — R73.02 IGT (IMPAIRED GLUCOSE TOLERANCE): Primary | ICD-10-CM

## 2023-09-28 DIAGNOSIS — M05.79 RHEUMATOID ARTHRITIS INVOLVING MULTIPLE SITES WITH POSITIVE RHEUMATOID FACTOR (HCC): ICD-10-CM

## 2023-09-28 DIAGNOSIS — K75.81 NASH (NONALCOHOLIC STEATOHEPATITIS): ICD-10-CM

## 2023-09-28 DIAGNOSIS — E78.5 DYSLIPIDEMIA: ICD-10-CM

## 2023-09-28 DIAGNOSIS — J44.89 ASTHMA WITH COPD: ICD-10-CM

## 2023-09-28 DIAGNOSIS — I10 PRIMARY HYPERTENSION: ICD-10-CM

## 2023-09-28 PROCEDURE — G8427 DOCREV CUR MEDS BY ELIG CLIN: HCPCS | Performed by: INTERNAL MEDICINE

## 2023-09-28 PROCEDURE — 3017F COLORECTAL CA SCREEN DOC REV: CPT | Performed by: INTERNAL MEDICINE

## 2023-09-28 PROCEDURE — 99214 OFFICE O/P EST MOD 30 MIN: CPT | Performed by: INTERNAL MEDICINE

## 2023-09-28 PROCEDURE — 3023F SPIROM DOC REV: CPT | Performed by: INTERNAL MEDICINE

## 2023-09-28 PROCEDURE — 1123F ACP DISCUSS/DSCN MKR DOCD: CPT | Performed by: INTERNAL MEDICINE

## 2023-09-28 PROCEDURE — 3078F DIAST BP <80 MM HG: CPT | Performed by: INTERNAL MEDICINE

## 2023-09-28 PROCEDURE — 1090F PRES/ABSN URINE INCON ASSESS: CPT | Performed by: INTERNAL MEDICINE

## 2023-09-28 PROCEDURE — G8399 PT W/DXA RESULTS DOCUMENT: HCPCS | Performed by: INTERNAL MEDICINE

## 2023-09-28 PROCEDURE — 3074F SYST BP LT 130 MM HG: CPT | Performed by: INTERNAL MEDICINE

## 2023-09-28 PROCEDURE — G8417 CALC BMI ABV UP PARAM F/U: HCPCS | Performed by: INTERNAL MEDICINE

## 2023-09-28 PROCEDURE — 1036F TOBACCO NON-USER: CPT | Performed by: INTERNAL MEDICINE

## 2023-09-28 SDOH — ECONOMIC STABILITY: INCOME INSECURITY: HOW HARD IS IT FOR YOU TO PAY FOR THE VERY BASICS LIKE FOOD, HOUSING, MEDICAL CARE, AND HEATING?: NOT HARD AT ALL

## 2023-09-28 SDOH — ECONOMIC STABILITY: FOOD INSECURITY: WITHIN THE PAST 12 MONTHS, THE FOOD YOU BOUGHT JUST DIDN'T LAST AND YOU DIDN'T HAVE MONEY TO GET MORE.: NEVER TRUE

## 2023-09-28 SDOH — ECONOMIC STABILITY: FOOD INSECURITY: WITHIN THE PAST 12 MONTHS, YOU WORRIED THAT YOUR FOOD WOULD RUN OUT BEFORE YOU GOT MONEY TO BUY MORE.: NEVER TRUE

## 2023-09-28 SDOH — ECONOMIC STABILITY: HOUSING INSECURITY
IN THE LAST 12 MONTHS, WAS THERE A TIME WHEN YOU DID NOT HAVE A STEADY PLACE TO SLEEP OR SLEPT IN A SHELTER (INCLUDING NOW)?: NO

## 2023-09-28 ASSESSMENT — PATIENT HEALTH QUESTIONNAIRE - PHQ9
SUM OF ALL RESPONSES TO PHQ QUESTIONS 1-9: 0
2. FEELING DOWN, DEPRESSED OR HOPELESS: 0
SUM OF ALL RESPONSES TO PHQ QUESTIONS 1-9: 0
1. LITTLE INTEREST OR PLEASURE IN DOING THINGS: 0
SUM OF ALL RESPONSES TO PHQ9 QUESTIONS 1 & 2: 0

## 2023-09-29 ENCOUNTER — TELEPHONE (OUTPATIENT)
Age: 68
End: 2023-09-29

## 2023-09-29 RX ORDER — ROSUVASTATIN CALCIUM 5 MG/1
5 TABLET, COATED ORAL DAILY
Qty: 30 TABLET | Refills: 5 | Status: SHIPPED | OUTPATIENT
Start: 2023-09-29

## 2023-09-29 NOTE — TELEPHONE ENCOUNTER
This RN called and informed patient that the oxygen order has been faxed to [facility].  Patient informed me that she is currently looking for a new pulmonologist.

## 2023-09-29 NOTE — PROGRESS NOTES
Chief Complaint   Patient presents with    Follow-up         Jacquelyn Haynes presents today for   Chief Complaint   Patient presents with    Follow-up           1. \"Have you been to the ER, urgent care clinic since your last visit? Hospitalized since your last visit? \" no    2. \"Have you seen or consulted any other health care providers outside of the [address] [place] [address] since your last visit? \" No [facility]      3. For patients aged 43-73: Has the patient had a colonoscopy / FIT/ Cologuard? No      If the patient is female:    4. For patients aged 43-66: Has the patient had a mammogram within the past 2 years? Yes - no Care Gap present      5. For patients aged 21-65: Has the patient had a pap smear?  No
c-  From 6/19 showed   gluc 95, cr 0.65, gfr>60,  alt 25,          chol 211, tg 192, hdl 53, ldl-c 120, wbc 5.9, hb 14.6, plt 244, tsh 0.97  From 6/20 showed   gluc 75, cr 0.60, gfr>60,  alt 13, hba1c 5.3, chol 225, tg 129, hdl 51, ldl-c 148,  wb 13.6, hb 15.3, plt 312, tsh 0.84, ft4 1.40, vit d 34.6, RA+, CCP+, zoraida neg, parvo neg, hep b/c-, crp 2.8, uric 4.1  From 6/21 showed   gluc 90, cr 0.60, gfr>60,  alt 22, hba1c 5.2,  chol 228, tg 224, hdl 51, ldl-c 132, wbc 8.4, hb 14.2, plt 277  From 1/22 showed   glu 105, cr 0.65, gfr>60,    hba1c 5.3, chol 211, tg 168, hdl 60, ldl-c 117  From 5/22 showed                wbc 12.7, hb 15.1, plt 279  From 1/23 showed   glu 119, cr 0.50, gfr>60, alt 12,          chol 220, tg 134, hdl 77, ldl-c 116      We reviewed the patient's labs from the last several visits to point out trends in the numbers      Patient Active Problem List   Diagnosis    Rheumatoid arthritis involving multiple sites with positive rheumatoid factor ([address])    Gastroesophageal reflux disease without esophagitis    Primary hypertension    Severe obesity (BMI 35.0-39. 9) with comorbidity (HCC)    SIENA (obstructive sleep apnea)    Bronchiectasis (HCC)    Chronic insomnia    Dyslipidemia    Asthma with COPD (HCC)    IGT (impaired glucose tolerance)    LUCIO (nonalcoholic steatohepatitis)         Assessment and plan:  1. GERD. Continue current regimen. 2. HTN. Controlled on current  3. Obesity. Lifestyle and dietary measures. Portion control reiterated. 4. Dyslipidemia. Trial crestor although no guarantees she won't have the same issues, recheck 4 mos  5. Fatty liver. Discussed this disease process in general including prognosis; continue attempts at wt loss  6. SIENA. F/U specialist  7. Asthma and COPD, bronchiectasis. F/u Dr Nancy Erickson  8. RA. Follow up Dr Manuela Avalos  9. IGT. Dietary and lifestyle measures  10.   Colonoscopy to be scheduled      RTC 1/24    Above conditions discussed at length and patient

## 2023-10-19 ENCOUNTER — TELEPHONE (OUTPATIENT)
Age: 68
End: 2023-10-19

## 2023-10-19 NOTE — TELEPHONE ENCOUNTER
Not sure what she is taking about? ??  no mention of hydro on the recent CT - this was chest study    Impression CT CHEST 10/23  1. Multifocal mucus plugging, bronchiectasis and bronchial wall thickening, most prominent in the left lower lobe and right middle lobe. This is a chronic process which has been present since remote exams, slowly progressing. However there has been some improvement when compared to the most recent exam from 6/25/2023 suggesting improvement in exacerbation. Differential considerations include chronic aspiration, atypical mycobacterial infection and allergic bronchopulmonary aspergillosis. She has mri abd in June - no hydro    FINDINGS: MRI ABD 6/23  LIMITATIONS: Patient motion diminishes the sensitivity of this test for detection of abnormalities. LOWER CHEST: Small volume left pleural effusion. Abnormal signal throughout the lung bases (left greater than right). A small hiatal hernia is present. LIVER: Hepatomegaly (21.2 cm). No significant loss of signal on opposed phase images. No enhancing hepatic mass. The portal and hepatic veins are patent. BILIARY: No biliary dilation. Gallbladder is unremarkable. PANCREAS: Normal.   SPLEEN: There are a few subcentimeter nonenhancing cystic structures (no dedicated follow-up required). Otherwise normal spleen. ADRENALS: Normal.   KIDNEYS: Benign-appearing nonenhancing left renal renal cysts, largest measures 7.4 cm (Bosniak 1 - Benign, no dedicated follow-up required). Symmetric cortical enhancement. No hydronephrosis. LYMPH NODES: No enlarged lymph nodes. GASTROINTESTINAL TRACT: No bowel dilation or wall thickening. A small hiatal hernia is present. VASCULATURE: Atherosclerosis. BONES: No acute or aggressive osseous abnormalities identified. Multilevel degenerative changes most pronounced in the lumbar spine.

## 2023-10-20 ENCOUNTER — HOSPITAL ENCOUNTER (OUTPATIENT)
Facility: HOSPITAL | Age: 68
End: 2023-10-20
Payer: MEDICARE

## 2023-10-20 DIAGNOSIS — E78.5 DYSLIPIDEMIA: ICD-10-CM

## 2023-10-20 DIAGNOSIS — R73.02 IGT (IMPAIRED GLUCOSE TOLERANCE): ICD-10-CM

## 2023-10-20 DIAGNOSIS — I10 PRIMARY HYPERTENSION: ICD-10-CM

## 2023-10-20 LAB
ALBUMIN SERPL-MCNC: 3.3 G/DL (ref 3.4–5)
ALBUMIN/GLOB SERPL: 1 (ref 0.8–1.7)
ALP SERPL-CCNC: 80 U/L (ref 45–117)
ALT SERPL-CCNC: 14 U/L (ref 13–56)
ANION GAP SERPL CALC-SCNC: 4 MMOL/L (ref 3–18)
AST SERPL-CCNC: 15 U/L (ref 10–38)
BASOPHILS # BLD: 0 K/UL (ref 0–0.1)
BASOPHILS NFR BLD: 0 % (ref 0–2)
BILIRUB SERPL-MCNC: 0.7 MG/DL (ref 0.2–1)
BUN SERPL-MCNC: 11 MG/DL (ref 7–18)
BUN/CREAT SERPL: 17 (ref 12–20)
CALCIUM SERPL-MCNC: 8.8 MG/DL (ref 8.5–10.1)
CHLORIDE SERPL-SCNC: 105 MMOL/L (ref 100–111)
CHOLEST SERPL-MCNC: 183 MG/DL
CO2 SERPL-SCNC: 30 MMOL/L (ref 21–32)
CREAT SERPL-MCNC: 0.64 MG/DL (ref 0.6–1.3)
DIFFERENTIAL METHOD BLD: ABNORMAL
EOSINOPHIL # BLD: 0 K/UL (ref 0–0.4)
EOSINOPHIL NFR BLD: 0 % (ref 0–5)
ERYTHROCYTE [DISTWIDTH] IN BLOOD BY AUTOMATED COUNT: 13.4 % (ref 11.6–14.5)
GLOBULIN SER CALC-MCNC: 3.4 G/DL (ref 2–4)
GLUCOSE SERPL-MCNC: 108 MG/DL (ref 74–99)
HBA1C MFR BLD: 5.2 % (ref 4.2–5.6)
HCT VFR BLD AUTO: 43.4 % (ref 35–45)
HDLC SERPL-MCNC: 77 MG/DL (ref 40–60)
HDLC SERPL: 2.4 (ref 0–5)
HGB BLD-MCNC: 14.1 G/DL (ref 12–16)
IMM GRANULOCYTES # BLD AUTO: 0 K/UL (ref 0–0.04)
IMM GRANULOCYTES NFR BLD AUTO: 0 % (ref 0–0.5)
LDLC SERPL CALC-MCNC: 85.6 MG/DL (ref 0–100)
LIPID PANEL: ABNORMAL
LYMPHOCYTES # BLD: 0.9 K/UL (ref 0.9–3.6)
LYMPHOCYTES NFR BLD: 7 % (ref 21–52)
MCH RBC QN AUTO: 27.6 PG (ref 24–34)
MCHC RBC AUTO-ENTMCNC: 32.5 G/DL (ref 31–37)
MCV RBC AUTO: 85.1 FL (ref 78–100)
MONOCYTES # BLD: 0.8 K/UL (ref 0.05–1.2)
MONOCYTES NFR BLD: 6 % (ref 3–10)
NEUTS SEG # BLD: 11 K/UL (ref 1.8–8)
NEUTS SEG NFR BLD: 86 % (ref 40–73)
NRBC # BLD: 0 K/UL (ref 0–0.01)
NRBC BLD-RTO: 0 PER 100 WBC
PLATELET # BLD AUTO: 293 K/UL (ref 135–420)
PMV BLD AUTO: 9.8 FL (ref 9.2–11.8)
POTASSIUM SERPL-SCNC: 3.8 MMOL/L (ref 3.5–5.5)
PROT SERPL-MCNC: 6.7 G/DL (ref 6.4–8.2)
RBC # BLD AUTO: 5.1 M/UL (ref 4.2–5.3)
SODIUM SERPL-SCNC: 139 MMOL/L (ref 136–145)
TRIGL SERPL-MCNC: 102 MG/DL
VLDLC SERPL CALC-MCNC: 20.4 MG/DL
WBC # BLD AUTO: 12.8 K/UL (ref 4.6–13.2)

## 2023-10-20 PROCEDURE — 36415 COLL VENOUS BLD VENIPUNCTURE: CPT

## 2023-10-20 PROCEDURE — 83036 HEMOGLOBIN GLYCOSYLATED A1C: CPT

## 2023-10-20 PROCEDURE — 80061 LIPID PANEL: CPT

## 2023-10-20 PROCEDURE — 85025 COMPLETE CBC W/AUTO DIFF WBC: CPT

## 2023-10-20 PROCEDURE — 80053 COMPREHEN METABOLIC PANEL: CPT

## 2023-10-20 NOTE — TELEPHONE ENCOUNTER
Patient states she is referring to CT of chest performed by Dr. Derian Morley. FINDINGS: 10/5/2023        Lungs / Airway: Multifocal bronchiectasis, bronchial wall thickening and mucus plugging is present throughout both lungs, most prominent in the lower lobes as well as in the right middle lobe. Areas of architectural distortion, volume loss and scarring are present in the right middle lobe. This has been present since the prior exam from 3/8/2019 although hydronephrosis worsened in the interim. There has been a slight improvement when compared to the most recent exam from 6/25/2023 suggesting improvement in an exacerbation of the patient's underlying disease. Patient wanted to know what she needs to do about this. Patient wanted to know if she should take her cholesterol medication with hydronephrosis.

## 2023-11-28 ENCOUNTER — TELEPHONE (OUTPATIENT)
Age: 68
End: 2023-11-28

## 2023-11-28 RX ORDER — RIVAROXABAN 20 MG/1
20 TABLET, FILM COATED ORAL
Qty: 30 TABLET | Refills: 5 | Status: SHIPPED | OUTPATIENT
Start: 2023-11-28 | End: 2023-11-29

## 2023-11-28 NOTE — TELEPHONE ENCOUNTER
[facility] called and states they received a prescription for Xarelto 20mg but they have that patient takes 10mg, so they are calling to confirm the dosage? They can be reached at [phone] option 1. Please advise, thank you.

## 2023-11-29 NOTE — TELEPHONE ENCOUNTER
Patient stated she doesn't need a refill she was only supposed to be on it 6 months so she is ended therapy in December. Patient was on it for DVT. She did confirm she is on the 20mg daily. Please advise if patient needs to continue past the 6 month time frame.

## 2024-01-02 DIAGNOSIS — I10 PRIMARY HYPERTENSION: ICD-10-CM

## 2024-01-03 RX ORDER — LOSARTAN POTASSIUM 50 MG/1
50 TABLET ORAL DAILY
Qty: 90 TABLET | Refills: 3 | Status: SHIPPED | OUTPATIENT
Start: 2024-01-03

## 2024-01-04 ENCOUNTER — TELEPHONE (OUTPATIENT)
Age: 69
End: 2024-01-04

## 2024-01-25 ENCOUNTER — HOSPITAL ENCOUNTER (OUTPATIENT)
Facility: HOSPITAL | Age: 69
Setting detail: SPECIMEN
Discharge: HOME | End: 2024-01-25
Payer: MEDICARE

## 2024-01-25 DIAGNOSIS — E78.5 DYSLIPIDEMIA: ICD-10-CM

## 2024-01-25 DIAGNOSIS — R73.02 IGT (IMPAIRED GLUCOSE TOLERANCE): ICD-10-CM

## 2024-01-25 LAB
ALBUMIN SERPL-MCNC: 3.3 G/DL (ref 3.4–5)
ALBUMIN/GLOB SERPL: 1 (ref 0.8–1.7)
ALP SERPL-CCNC: 100 U/L (ref 45–117)
ALT SERPL-CCNC: 16 U/L (ref 13–56)
ANION GAP SERPL CALC-SCNC: 6 MMOL/L (ref 3–18)
AST SERPL-CCNC: 16 U/L (ref 10–38)
BILIRUB SERPL-MCNC: 0.9 MG/DL (ref 0.2–1)
BUN SERPL-MCNC: 11 MG/DL (ref 7–18)
BUN/CREAT SERPL: 19 (ref 12–20)
CALCIUM SERPL-MCNC: 9 MG/DL (ref 8.5–10.1)
CHLORIDE SERPL-SCNC: 105 MMOL/L (ref 100–111)
CHOLEST SERPL-MCNC: 149 MG/DL
CO2 SERPL-SCNC: 27 MMOL/L (ref 21–32)
CREAT SERPL-MCNC: 0.59 MG/DL (ref 0.6–1.3)
ERYTHROCYTE [DISTWIDTH] IN BLOOD BY AUTOMATED COUNT: 13.8 % (ref 11.6–14.5)
GLOBULIN SER CALC-MCNC: 3.3 G/DL (ref 2–4)
GLUCOSE SERPL-MCNC: 93 MG/DL (ref 74–99)
HCT VFR BLD AUTO: 41.8 % (ref 35–45)
HDLC SERPL-MCNC: 71 MG/DL (ref 40–60)
HDLC SERPL: 2.1 (ref 0–5)
HGB BLD-MCNC: 13.4 G/DL (ref 12–16)
LDLC SERPL CALC-MCNC: 61 MG/DL (ref 0–100)
LIPID PANEL: ABNORMAL
MCH RBC QN AUTO: 27.2 PG (ref 24–34)
MCHC RBC AUTO-ENTMCNC: 32.1 G/DL (ref 31–37)
MCV RBC AUTO: 85 FL (ref 78–100)
NRBC # BLD: 0 K/UL (ref 0–0.01)
NRBC BLD-RTO: 0 PER 100 WBC
PLATELET # BLD AUTO: 291 K/UL (ref 135–420)
PMV BLD AUTO: 9.8 FL (ref 9.2–11.8)
POTASSIUM SERPL-SCNC: 3.9 MMOL/L (ref 3.5–5.5)
PROT SERPL-MCNC: 6.6 G/DL (ref 6.4–8.2)
RBC # BLD AUTO: 4.92 M/UL (ref 4.2–5.3)
SODIUM SERPL-SCNC: 138 MMOL/L (ref 136–145)
TRIGL SERPL-MCNC: 85 MG/DL
VLDLC SERPL CALC-MCNC: 17 MG/DL
WBC # BLD AUTO: 12 K/UL (ref 4.6–13.2)

## 2024-01-25 PROCEDURE — 80053 COMPREHEN METABOLIC PANEL: CPT

## 2024-01-25 PROCEDURE — 36415 COLL VENOUS BLD VENIPUNCTURE: CPT

## 2024-01-25 PROCEDURE — 85027 COMPLETE CBC AUTOMATED: CPT

## 2024-01-25 PROCEDURE — 80061 LIPID PANEL: CPT

## 2024-01-29 SDOH — HEALTH STABILITY: PHYSICAL HEALTH: ON AVERAGE, HOW MANY MINUTES DO YOU ENGAGE IN EXERCISE AT THIS LEVEL?: 0 MIN

## 2024-01-29 SDOH — HEALTH STABILITY: PHYSICAL HEALTH: ON AVERAGE, HOW MANY DAYS PER WEEK DO YOU ENGAGE IN MODERATE TO STRENUOUS EXERCISE (LIKE A BRISK WALK)?: 0 DAYS

## 2024-01-29 ASSESSMENT — LIFESTYLE VARIABLES
HOW OFTEN DO YOU HAVE A DRINK CONTAINING ALCOHOL: 1
HOW OFTEN DO YOU HAVE SIX OR MORE DRINKS ON ONE OCCASION: 1
HOW MANY STANDARD DRINKS CONTAINING ALCOHOL DO YOU HAVE ON A TYPICAL DAY: PATIENT DOES NOT DRINK
HOW MANY STANDARD DRINKS CONTAINING ALCOHOL DO YOU HAVE ON A TYPICAL DAY: 0
HOW OFTEN DO YOU HAVE A DRINK CONTAINING ALCOHOL: NEVER

## 2024-02-01 ENCOUNTER — OFFICE VISIT (OUTPATIENT)
Age: 69
End: 2024-02-01
Payer: MEDICARE

## 2024-02-01 VITALS
BODY MASS INDEX: 35.7 KG/M2 | TEMPERATURE: 97.3 F | HEART RATE: 83 BPM | DIASTOLIC BLOOD PRESSURE: 72 MMHG | RESPIRATION RATE: 18 BRPM | WEIGHT: 194 LBS | OXYGEN SATURATION: 93 % | HEIGHT: 62 IN | SYSTOLIC BLOOD PRESSURE: 134 MMHG

## 2024-02-01 DIAGNOSIS — Z71.89 ADVANCED CARE PLANNING/COUNSELING DISCUSSION: ICD-10-CM

## 2024-02-01 DIAGNOSIS — K21.9 GASTROESOPHAGEAL REFLUX DISEASE WITHOUT ESOPHAGITIS: ICD-10-CM

## 2024-02-01 DIAGNOSIS — E78.5 DYSLIPIDEMIA: ICD-10-CM

## 2024-02-01 DIAGNOSIS — J44.89 ASTHMA WITH COPD: ICD-10-CM

## 2024-02-01 DIAGNOSIS — I10 PRIMARY HYPERTENSION: ICD-10-CM

## 2024-02-01 DIAGNOSIS — Z00.00 MEDICARE ANNUAL WELLNESS VISIT, SUBSEQUENT: Primary | ICD-10-CM

## 2024-02-01 DIAGNOSIS — R73.02 IGT (IMPAIRED GLUCOSE TOLERANCE): ICD-10-CM

## 2024-02-01 DIAGNOSIS — K75.81 NASH (NONALCOHOLIC STEATOHEPATITIS): ICD-10-CM

## 2024-02-01 DIAGNOSIS — G47.33 OSA (OBSTRUCTIVE SLEEP APNEA): ICD-10-CM

## 2024-02-01 DIAGNOSIS — Z12.31 SCREENING MAMMOGRAM FOR BREAST CANCER: ICD-10-CM

## 2024-02-01 DIAGNOSIS — M05.79 RHEUMATOID ARTHRITIS INVOLVING MULTIPLE SITES WITH POSITIVE RHEUMATOID FACTOR (HCC): ICD-10-CM

## 2024-02-01 DIAGNOSIS — E66.01 SEVERE OBESITY (BMI 35.0-39.9) WITH COMORBIDITY (HCC): ICD-10-CM

## 2024-02-01 DIAGNOSIS — J47.9 BRONCHIECTASIS WITHOUT COMPLICATION (HCC): ICD-10-CM

## 2024-02-01 PROCEDURE — 1123F ACP DISCUSS/DSCN MKR DOCD: CPT | Performed by: INTERNAL MEDICINE

## 2024-02-01 PROCEDURE — G8484 FLU IMMUNIZE NO ADMIN: HCPCS | Performed by: INTERNAL MEDICINE

## 2024-02-01 PROCEDURE — 3078F DIAST BP <80 MM HG: CPT | Performed by: INTERNAL MEDICINE

## 2024-02-01 PROCEDURE — G8417 CALC BMI ABV UP PARAM F/U: HCPCS | Performed by: INTERNAL MEDICINE

## 2024-02-01 PROCEDURE — 3017F COLORECTAL CA SCREEN DOC REV: CPT | Performed by: INTERNAL MEDICINE

## 2024-02-01 PROCEDURE — G8399 PT W/DXA RESULTS DOCUMENT: HCPCS | Performed by: INTERNAL MEDICINE

## 2024-02-01 PROCEDURE — 3023F SPIROM DOC REV: CPT | Performed by: INTERNAL MEDICINE

## 2024-02-01 PROCEDURE — 99214 OFFICE O/P EST MOD 30 MIN: CPT | Performed by: INTERNAL MEDICINE

## 2024-02-01 PROCEDURE — G0439 PPPS, SUBSEQ VISIT: HCPCS | Performed by: INTERNAL MEDICINE

## 2024-02-01 PROCEDURE — 3075F SYST BP GE 130 - 139MM HG: CPT | Performed by: INTERNAL MEDICINE

## 2024-02-01 PROCEDURE — 1036F TOBACCO NON-USER: CPT | Performed by: INTERNAL MEDICINE

## 2024-02-01 PROCEDURE — 99497 ADVNCD CARE PLAN 30 MIN: CPT | Performed by: INTERNAL MEDICINE

## 2024-02-01 PROCEDURE — 1090F PRES/ABSN URINE INCON ASSESS: CPT | Performed by: INTERNAL MEDICINE

## 2024-02-01 PROCEDURE — G8427 DOCREV CUR MEDS BY ELIG CLIN: HCPCS | Performed by: INTERNAL MEDICINE

## 2024-02-02 NOTE — PROGRESS NOTES
Medicare Annual Wellness Visit    Nicol Couch is here for Medicare AWV    Assessment & Plan   Severe obesity (BMI 35.0-39.9) with comorbidity (HCC)  Dyslipidemia  -     Lipid Panel; Future  Rheumatoid arthritis involving multiple sites with positive rheumatoid factor (HCC)  IGT (impaired glucose tolerance)  -     HEMOGLOBIN A1C W/O EAG; Future  -     Basic Metabolic Panel; Future  LUCIO (nonalcoholic steatohepatitis)  Gastroesophageal reflux disease without esophagitis  Asthma with COPD  Bronchiectasis without complication (HCC)  SIENA (obstructive sleep apnea)  Primary hypertension  Medicare annual wellness visit, subsequent    Recommendations for Preventive Services Due: see orders and patient instructions/AVS.  Recommended screening schedule for the next 5-10 years is provided to the patient in written form: see Patient Instructions/AVS.     Return for Medicare Annual Wellness Visit in 1 year.     Subjective       Patient's complete Health Risk Assessment and screening values have been reviewed and are found in Flowsheets. The following problems were reviewed today and where indicated follow up appointments were made and/or referrals ordered.    Positive Risk Factor Screenings with Interventions:                Activity, Diet, and Weight:  On average, how many days per week do you engage in moderate to strenuous exercise (like a brisk walk)?: 0 days  On average, how many minutes do you engage in exercise at this level?: 0 min    Do you eat balanced/healthy meals regularly?: (!) No    Body mass index is 35.48 kg/m². (!) Abnormal      Inactivity Interventions:  Patient declined any further interventions or treatment  Do you eat balanced/healthy meals regularly Interventions:  Patient declines any further evaluation or treatment  Obesity Interventions:  Patient declines any further evaluation or treatment                ADL's:   Patient reports needing help with:  Select all that apply: (!) Housekeeping, 
Nicol Couch presents today for   Chief Complaint   Patient presents with    Medicare AWV       1. \"Have you been to the ER, urgent care clinic since your last visit?  Hospitalized since your last visit?\" No    2. \"Have you seen or consulted any other health care providers outside of the [facility] since your last visit?\" Yes     3. For patients aged 45-75: Has the patient had a colonoscopy / FIT/ Cologuard? No      If the patient is female:    4. For patients aged 40-74: Has the patient had a mammogram within the past 2 years? Yes - no Care Gap present      5. For patients aged 21-65: Has the patient had a pap smear? NA - based on age or sex    
paresthetica of left side 07/31/2017    Morbid obesity with BMI of 40.0-44.9, adult ([facility])     peak weight 231 lbs, bmi 42.2 from 7/13; qsymia trial 4/14; declined med supervised wt loss, bariatrics w Dr De Jesus; IF 4/18    LUCIO (nonalcoholic steatohepatitis) 09/2023    Dr Aponte; elastography with F1-F2 fibrosis, 5-15% steatosis (9/23)    SIENA on CPAP 10/2012    Dr PRADEEP Vidal; AHI 15, RDI 15, lowest sat 84%, CPAP 15CWP; now seeing Dr Jarvis    Pseudomonas respiratory infection 2015    Dr Hodge    RA (rheumatoid arthritis) ([facility]) 06/11/2020    Dr Barrientos; ccp/ra+; MTX, cimzia; now Dr Horton    Venous thrombosis 06/2023    LUE prox brachial and basilic veins     Past Surgical History:   Procedure Laterality Date    BRONCHOSCOPY  1987    CATARACT REMOVAL Bilateral 2017    Dr Pardo     COLONOSCOPY      Dr. Mcfarlane (3/12) adenoma; (10/13/17) polyp    LUNG BIOPSY  03/2017    MENISCECTOMY Left 06/2014    Dr Duque     DE UNLISTED PROCEDURE CARDIAC SURGERY  02/2015    holter negative    SINUS SURGERY  03/2017    X2 (3/17), (6/12); Dr Decker    THORACOSCOPY W/LOBECTOMY SINGLE LOBE  07/2006    s/p SUMEET Lobectomy Dr. Bloom 2006    TONSILLECTOMY      UMBILICAL HERNIA REPAIR  08/2022    Dr Urbina    XR MIDLINE EQUAL OR GREATER THAN 5 YEARS  6/29/2023    XR MIDLINE EQUAL OR GREATER THAN 5 YEARS 6/29/2023    XR MIDLINE EQUAL OR GREATER THAN 5 YEARS  11/9/2021    XR MIDLINE EQUAL OR GREATER THAN 5 YEARS 11/9/2021     Social History     Socioeconomic History    Marital status:      Spouse name: Not on file    Number of children: Not on file    Years of education: Not on file    Highest education level: Not on file   Occupational History    Occupation: ret RN [employer]   Tobacco Use    Smoking status: Never     Passive exposure: Never    Smokeless tobacco: Never   Vaping Use    Vaping Use: Never used   Substance and Sexual Activity    Alcohol use: No    Drug use: No    Sexual activity: Not on file   Other Topics Concern    Not on file

## 2024-02-02 NOTE — PATIENT INSTRUCTIONS
having problems. It's also a good idea to know your test results and keep a list of the medicines you take.  How can you care for yourself at home?  Diet    Use less salt when you cook and eat. This helps lower your blood pressure. Taste food before salting. Add only a little salt when you think you need it. With time, your taste buds will adjust to less salt.     Eat fewer snack items, fast foods, canned soups, and other high-salt, high-fat, processed foods.     Read food labels and try to avoid saturated and trans fats. They increase your risk of heart disease by raising cholesterol levels.     Limit the amount of solid fat-butter, margarine, and shortening-you eat. Use olive, peanut, or canola oil when you cook. Bake, broil, and steam foods instead of frying them.     Eat a variety of fruit and vegetables every day. Dark green, deep orange, red, or yellow fruits and vegetables are especially good for you. Examples include spinach, carrots, peaches, and berries.     Foods high in fiber can reduce your cholesterol and provide important vitamins and minerals. High-fiber foods include whole-grain cereals and breads, oatmeal, beans, brown rice, citrus fruits, and apples.     Eat lean proteins. Heart-healthy proteins include seafood, lean meats and poultry, eggs, beans, peas, nuts, seeds, and soy products.     Limit drinks and foods with added sugar. These include candy, desserts, and soda pop.   Lifestyle changes    If your doctor recommends it, get more exercise. Walking is a good choice. Bit by bit, increase the amount you walk every day. Try for at least 30 minutes on most days of the week. You also may want to swim, bike, or do other activities.     Do not smoke. If you need help quitting, talk to your doctor about stop-smoking programs and medicines. These can increase your chances of quitting for good. Quitting smoking may be the most important step you can take to protect your heart. It is never too late to

## 2024-03-01 ENCOUNTER — TELEPHONE (OUTPATIENT)
Age: 69
End: 2024-03-01

## 2024-03-01 NOTE — TELEPHONE ENCOUNTER
Pt scalled stating her discharge papers from the hospital instructed her to stop taking her BP medication, pt is asking for how long , she has questions and asking to speak with the nurse her hosp f/up is not until 03/05 please advise   [phone]

## 2024-03-05 ENCOUNTER — OFFICE VISIT (OUTPATIENT)
Age: 69
End: 2024-03-05
Payer: MEDICARE

## 2024-03-05 VITALS
TEMPERATURE: 98.2 F | WEIGHT: 184 LBS | HEART RATE: 76 BPM | DIASTOLIC BLOOD PRESSURE: 68 MMHG | BODY MASS INDEX: 33.86 KG/M2 | OXYGEN SATURATION: 93 % | HEIGHT: 62 IN | SYSTOLIC BLOOD PRESSURE: 117 MMHG

## 2024-03-05 DIAGNOSIS — M05.79 RHEUMATOID ARTHRITIS INVOLVING MULTIPLE SITES WITH POSITIVE RHEUMATOID FACTOR (HCC): ICD-10-CM

## 2024-03-05 DIAGNOSIS — F41.9 ANXIETY: ICD-10-CM

## 2024-03-05 DIAGNOSIS — B37.31 YEAST VAGINITIS: Primary | ICD-10-CM

## 2024-03-05 DIAGNOSIS — J47.9 BRONCHIECTASIS WITHOUT COMPLICATION (HCC): ICD-10-CM

## 2024-03-05 DIAGNOSIS — J44.89 ASTHMA WITH COPD: ICD-10-CM

## 2024-03-05 PROCEDURE — 1090F PRES/ABSN URINE INCON ASSESS: CPT | Performed by: INTERNAL MEDICINE

## 2024-03-05 PROCEDURE — 99215 OFFICE O/P EST HI 40 MIN: CPT | Performed by: INTERNAL MEDICINE

## 2024-03-05 PROCEDURE — G8484 FLU IMMUNIZE NO ADMIN: HCPCS | Performed by: INTERNAL MEDICINE

## 2024-03-05 PROCEDURE — 3074F SYST BP LT 130 MM HG: CPT | Performed by: INTERNAL MEDICINE

## 2024-03-05 PROCEDURE — 1036F TOBACCO NON-USER: CPT | Performed by: INTERNAL MEDICINE

## 2024-03-05 PROCEDURE — G8428 CUR MEDS NOT DOCUMENT: HCPCS | Performed by: INTERNAL MEDICINE

## 2024-03-05 PROCEDURE — 3023F SPIROM DOC REV: CPT | Performed by: INTERNAL MEDICINE

## 2024-03-05 PROCEDURE — G8399 PT W/DXA RESULTS DOCUMENT: HCPCS | Performed by: INTERNAL MEDICINE

## 2024-03-05 PROCEDURE — 1123F ACP DISCUSS/DSCN MKR DOCD: CPT | Performed by: INTERNAL MEDICINE

## 2024-03-05 PROCEDURE — 3078F DIAST BP <80 MM HG: CPT | Performed by: INTERNAL MEDICINE

## 2024-03-05 PROCEDURE — G8417 CALC BMI ABV UP PARAM F/U: HCPCS | Performed by: INTERNAL MEDICINE

## 2024-03-05 PROCEDURE — 3017F COLORECTAL CA SCREEN DOC REV: CPT | Performed by: INTERNAL MEDICINE

## 2024-03-06 DIAGNOSIS — F41.9 ANXIETY: Primary | ICD-10-CM

## 2024-03-06 RX ORDER — FLUCONAZOLE 150 MG/1
150 TABLET ORAL ONCE
Qty: 1 TABLET | Refills: 0 | Status: SHIPPED | OUTPATIENT
Start: 2024-03-06 | End: 2024-03-06

## 2024-03-06 RX ORDER — LORAZEPAM 0.5 MG/1
0.5 TABLET ORAL 2 TIMES DAILY PRN
Qty: 40 TABLET | Refills: 0 | OUTPATIENT
Start: 2024-03-06 | End: 2024-04-05

## 2024-03-06 RX ORDER — LORAZEPAM 0.5 MG/1
0.5 TABLET ORAL 2 TIMES DAILY PRN
Qty: 60 TABLET | Refills: 0 | Status: SHIPPED | OUTPATIENT
Start: 2024-03-06 | End: 2024-04-05

## 2024-03-06 RX ORDER — DIAZEPAM 5 MG/1
5 TABLET ORAL EVERY 8 HOURS PRN
Qty: 60 TABLET | Refills: 0 | Status: SHIPPED | OUTPATIENT
Start: 2024-03-06 | End: 2024-04-05

## 2024-03-06 NOTE — TELEPHONE ENCOUNTER
Called and spoke to patient to clarify which medication she was calling about, patient states she was just in yesterday and the doctor should know which medication and check his notes. Patient states she could've used the medication today and doesn't understand why it wasn't sent to the pharmacy.

## 2024-03-06 NOTE — TELEPHONE ENCOUNTER
Patient called office back stating that she didn't not want ativan called into the pharmacy. She states it was suppose to be valium. Please let the patient know when medication has been sent to the pharmacy.

## 2024-03-06 NOTE — PROGRESS NOTES
Nicol Couch presents today for   Chief Complaint   Patient presents with    Follow-Up from Hospital                 1. \"Have you been to the ER, urgent care clinic since your last visit?  Hospitalized since your last visit?\" yes    2. \"Have you seen or consulted any other health care providers outside of the [facility] since your last visit?\" yes     3. For patients aged 45-75: Has the patient had a colonoscopy / FIT/ Cologuard? No      If the patient is female:    4. For patients aged 40-74: Has the patient had a mammogram within the past 2 years? Yes - no Care Gap present      5. For patients aged 21-65: Has the patient had a pap smear? No    
Again, this is favored to represent acute on chronic infection.  3. Presumed reactive lymphadenopathy is also increased in prominence since the most recent exam of 2/12/2024.    Assessment and plan:  1. PNA and sepsis.  Finish out course of cefepime.  Follow up Dr Zaman and Dr Barreto as directed  2. R/o yeast vaginitis.  Trial topical but we did send in diflucan in case ineffective  3. Anxiety.  Pt requested refill of ativan as very stressed right now          Above conditions discussed at length and patient vocalized understanding.  All questions answered to patient satisfaction

## 2024-03-06 NOTE — TELEPHONE ENCOUNTER
Patient called in today and states she asked for prescriptions to be filled yesterday during her visit and the pharmacy states they have not received anything yet.   The meds she is asking to be filled is Kinsey. Please advise    #: [phone]

## 2024-03-18 ENCOUNTER — HOSPITAL ENCOUNTER (OUTPATIENT)
Facility: HOSPITAL | Age: 69
Discharge: HOME | End: 2024-03-21
Attending: INTERNAL MEDICINE
Payer: MEDICARE

## 2024-03-18 DIAGNOSIS — J47.9 BRONCHIOLECTASIS (HCC): ICD-10-CM

## 2024-03-18 DIAGNOSIS — K76.0 NAFLD (NONALCOHOLIC FATTY LIVER DISEASE): ICD-10-CM

## 2024-03-18 DIAGNOSIS — Z86.010 PERSONAL HISTORY OF COLONIC POLYPS: ICD-10-CM

## 2024-03-18 DIAGNOSIS — K31.9 MUCOSAL ABNORMALITY OF DUODENUM: ICD-10-CM

## 2024-03-18 DIAGNOSIS — E66.9 OBESITY, UNSPECIFIED CLASSIFICATION, UNSPECIFIED OBESITY TYPE, UNSPECIFIED WHETHER SERIOUS COMORBIDITY PRESENT: ICD-10-CM

## 2024-03-18 DIAGNOSIS — K21.9 GASTROESOPHAGEAL REFLUX DISEASE WITHOUT ESOPHAGITIS: ICD-10-CM

## 2024-03-18 DIAGNOSIS — K76.9 LIVER LESION: ICD-10-CM

## 2024-03-18 DIAGNOSIS — Z83.719 FAMILY HX COLONIC POLYPS: ICD-10-CM

## 2024-03-18 PROCEDURE — 74248 X-RAY SM INT F-THRU STD: CPT

## 2024-03-18 PROCEDURE — 2500000003 HC RX 250 WO HCPCS: Performed by: INTERNAL MEDICINE

## 2024-03-18 PROCEDURE — 6370000000 HC RX 637 (ALT 250 FOR IP): Performed by: INTERNAL MEDICINE

## 2024-03-18 RX ORDER — ROSUVASTATIN CALCIUM 5 MG/1
5 TABLET, COATED ORAL DAILY
Qty: 30 TABLET | Refills: 5 | Status: CANCELLED | OUTPATIENT
Start: 2024-03-18

## 2024-03-18 RX ADMIN — BARIUM SULFATE 140 ML: 980 POWDER, FOR SUSPENSION ORAL at 08:34

## 2024-03-18 RX ADMIN — ANTACID/ANTIFLATULENT 1 EACH: 380; 550; 10; 10 GRANULE, EFFERVESCENT ORAL at 08:50

## 2024-03-18 RX ADMIN — BARIUM SULFATE 176 ML: 960 POWDER, FOR SUSPENSION ORAL at 08:50

## 2024-03-22 RX ORDER — ROSUVASTATIN CALCIUM 5 MG/1
5 TABLET, COATED ORAL DAILY
Qty: 90 TABLET | Refills: 3 | Status: SHIPPED | OUTPATIENT
Start: 2024-03-22

## 2024-04-22 ENCOUNTER — OFFICE VISIT (OUTPATIENT)
Age: 69
End: 2024-04-22
Payer: MEDICARE

## 2024-04-22 VITALS
RESPIRATION RATE: 18 BRPM | OXYGEN SATURATION: 89 % | BODY MASS INDEX: 33.68 KG/M2 | SYSTOLIC BLOOD PRESSURE: 113 MMHG | WEIGHT: 183 LBS | HEIGHT: 62 IN | DIASTOLIC BLOOD PRESSURE: 62 MMHG | TEMPERATURE: 98.2 F | HEART RATE: 80 BPM

## 2024-04-22 DIAGNOSIS — R09.02 HYPOXEMIA: Primary | ICD-10-CM

## 2024-04-22 DIAGNOSIS — R04.2 HEMOPTYSIS: ICD-10-CM

## 2024-04-22 DIAGNOSIS — J44.89 ASTHMA WITH COPD (HCC): ICD-10-CM

## 2024-04-22 DIAGNOSIS — J47.1 BRONCHIECTASIS WITH ACUTE EXACERBATION (HCC): ICD-10-CM

## 2024-04-22 DIAGNOSIS — R09.1 PLEURISY: ICD-10-CM

## 2024-04-22 PROCEDURE — 3074F SYST BP LT 130 MM HG: CPT | Performed by: INTERNAL MEDICINE

## 2024-04-22 PROCEDURE — G8399 PT W/DXA RESULTS DOCUMENT: HCPCS | Performed by: INTERNAL MEDICINE

## 2024-04-22 PROCEDURE — G8417 CALC BMI ABV UP PARAM F/U: HCPCS | Performed by: INTERNAL MEDICINE

## 2024-04-22 PROCEDURE — 3023F SPIROM DOC REV: CPT | Performed by: INTERNAL MEDICINE

## 2024-04-22 PROCEDURE — G8427 DOCREV CUR MEDS BY ELIG CLIN: HCPCS | Performed by: INTERNAL MEDICINE

## 2024-04-22 PROCEDURE — 1036F TOBACCO NON-USER: CPT | Performed by: INTERNAL MEDICINE

## 2024-04-22 PROCEDURE — 1090F PRES/ABSN URINE INCON ASSESS: CPT | Performed by: INTERNAL MEDICINE

## 2024-04-22 PROCEDURE — 1123F ACP DISCUSS/DSCN MKR DOCD: CPT | Performed by: INTERNAL MEDICINE

## 2024-04-22 PROCEDURE — 99215 OFFICE O/P EST HI 40 MIN: CPT | Performed by: INTERNAL MEDICINE

## 2024-04-22 PROCEDURE — 3017F COLORECTAL CA SCREEN DOC REV: CPT | Performed by: INTERNAL MEDICINE

## 2024-04-22 PROCEDURE — 3078F DIAST BP <80 MM HG: CPT | Performed by: INTERNAL MEDICINE

## 2024-04-22 NOTE — PROGRESS NOTES
Nicol Angelaton presents today for   Chief Complaint   Patient presents with    Discomfort in left lung     Off and on for about 10 days; pulmonary appointment in May       \"Have you been to the ER, urgent care clinic since your last visit?  Hospitalized since your last visit?\"    NO    “Have you seen or consulted any other health care providers outside of [facility] since your last visit?”    NO    “Have you had a colorectal cancer screening such as a colonoscopy/FIT/Cologuard?    NO    Date of last Colonoscopy: 10/13/2017  No cologuard on file  No FIT/FOBT on file   No flexible sigmoidoscopy on file              
echocardiogram     nl lv, ef 55%, nl wma, mild mr (2/15) [facility]    Hypertension     IGT (impaired glucose tolerance)     Insomnia     no response to trazodone, ambien, restoril    Lipoma of right axilla 08/2022    Dr Urbina s/p resection    Meralgia paresthetica of left side 07/31/2017    Morbid obesity with BMI of 40.0-44.9, adult ([facility])     peak weight 231 lbs, bmi 42.2 from 7/13; qsymia trial 4/14; declined med supervised wt loss, bariatrics w Dr De Jesus; IF 4/18    NAFLD (nonalcoholic fatty liver disease) 2023    Dr Aponte; mod fibrosis/steatosis (12/23) on fibrosure    LUCIO (nonalcoholic steatohepatitis) 09/2023    Dr Aponte; elastography with F1-F2 fibrosis, 5-15% steatosis (9/23)    SIENA on CPAP 10/2012    Dr PRADEEP Vidal; AHI 15, RDI 15, lowest sat 84%, CPAP 15CWP; now seeing Dr Jarvis    Pleural effusion, left 03/2024    cyto/cultures neg SOH    Pseudomonas respiratory infection 2015    Dr Hodge    RA (rheumatoid arthritis) ([facility]) 06/11/2020    Dr Barrientos; ccp/ra+; MTX, cimzia; now Dr Horton    Venous thrombosis 06/2023    LUE prox brachial and basilic veins     Past Surgical History:   Procedure Laterality Date    BRONCHOSCOPY  1987    CATARACT REMOVAL Bilateral 2017    Dr Pardo     COLONOSCOPY      Dr. Mcfarlane (3/12) adenoma; (10/13/17) polyp    LUNG BIOPSY  03/2017    MENISCECTOMY Left 06/2014    Dr Duque     SC UNLISTED PROCEDURE CARDIAC SURGERY  02/2015    holter negative    SINUS SURGERY  03/2017    X2 (3/17), (6/12); Dr Decker    THORACOSCOPY W/LOBECTOMY SINGLE LOBE  07/2006    s/p SUMEET Lobectomy Dr. Bloom 2006    TONSILLECTOMY      UMBILICAL HERNIA REPAIR  08/2022    Dr Urbina    XR MIDLINE EQUAL OR GREATER THAN 5 YEARS  6/29/2023    XR MIDLINE EQUAL OR GREATER THAN 5 YEARS 6/29/2023    XR MIDLINE EQUAL OR GREATER THAN 5 YEARS  11/9/2021    XR MIDLINE EQUAL OR GREATER THAN 5 YEARS 11/9/2021    XR MIDLINE EQUAL OR GREATER THAN 5 YEARS  2/29/2024    XR MIDLINE EQUAL OR GREATER THAN 5 YEARS 2/29/2024    XR

## 2024-06-25 PROBLEM — J45.40 MODERATE PERSISTENT ASTHMA: Status: ACTIVE | Noted: 2024-06-25

## 2024-06-25 RX ORDER — ONDANSETRON 2 MG/ML
8 INJECTION INTRAMUSCULAR; INTRAVENOUS
Status: CANCELLED | OUTPATIENT
Start: 2024-07-01

## 2024-06-25 RX ORDER — EPINEPHRINE 1 MG/ML
0.3 INJECTION, SOLUTION, CONCENTRATE INTRAVENOUS PRN
Status: CANCELLED | OUTPATIENT
Start: 2024-07-01

## 2024-06-25 RX ORDER — ALBUTEROL SULFATE 90 UG/1
4 AEROSOL, METERED RESPIRATORY (INHALATION) PRN
Status: CANCELLED | OUTPATIENT
Start: 2024-07-01

## 2024-06-25 RX ORDER — ACETAMINOPHEN 325 MG/1
650 TABLET ORAL
Status: CANCELLED | OUTPATIENT
Start: 2024-07-01

## 2024-06-25 RX ORDER — SODIUM CHLORIDE 9 MG/ML
INJECTION, SOLUTION INTRAVENOUS CONTINUOUS
Status: CANCELLED | OUTPATIENT
Start: 2024-07-01

## 2024-06-25 RX ORDER — DIPHENHYDRAMINE HYDROCHLORIDE 50 MG/ML
50 INJECTION INTRAMUSCULAR; INTRAVENOUS
Status: CANCELLED | OUTPATIENT
Start: 2024-07-01

## 2024-07-01 ENCOUNTER — HOSPITAL ENCOUNTER (OUTPATIENT)
Facility: HOSPITAL | Age: 69
Setting detail: INFUSION SERIES
Discharge: HOME | End: 2024-07-04
Payer: MEDICARE

## 2024-07-01 VITALS
DIASTOLIC BLOOD PRESSURE: 83 MMHG | TEMPERATURE: 98.9 F | OXYGEN SATURATION: 94 % | RESPIRATION RATE: 16 BRPM | HEART RATE: 71 BPM | SYSTOLIC BLOOD PRESSURE: 130 MMHG

## 2024-07-01 DIAGNOSIS — J45.40 MODERATE PERSISTENT ASTHMA, UNSPECIFIED WHETHER COMPLICATED: Primary | ICD-10-CM

## 2024-07-01 PROCEDURE — 96372 THER/PROPH/DIAG INJ SC/IM: CPT

## 2024-07-01 PROCEDURE — 6360000002 HC RX W HCPCS: Performed by: INTERNAL MEDICINE

## 2024-07-01 RX ORDER — ONDANSETRON 2 MG/ML
8 INJECTION INTRAMUSCULAR; INTRAVENOUS
OUTPATIENT
Start: 2024-07-29

## 2024-07-01 RX ORDER — BUDESONIDE, GLYCOPYRROLATE, AND FORMOTEROL FUMARATE 160; 9; 4.8 UG/1; UG/1; UG/1
AEROSOL, METERED RESPIRATORY (INHALATION)
COMMUNITY

## 2024-07-01 RX ORDER — DIPHENHYDRAMINE HYDROCHLORIDE 50 MG/ML
50 INJECTION INTRAMUSCULAR; INTRAVENOUS
OUTPATIENT
Start: 2024-07-29

## 2024-07-01 RX ORDER — ALBUTEROL SULFATE 90 UG/1
4 AEROSOL, METERED RESPIRATORY (INHALATION) PRN
OUTPATIENT
Start: 2024-07-29

## 2024-07-01 RX ORDER — ACETAMINOPHEN 325 MG/1
650 TABLET ORAL
OUTPATIENT
Start: 2024-07-29

## 2024-07-01 RX ORDER — EPINEPHRINE 1 MG/ML
0.3 INJECTION, SOLUTION, CONCENTRATE INTRAVENOUS PRN
OUTPATIENT
Start: 2024-07-29

## 2024-07-01 RX ORDER — SODIUM CHLORIDE 9 MG/ML
INJECTION, SOLUTION INTRAVENOUS CONTINUOUS
OUTPATIENT
Start: 2024-07-29

## 2024-07-01 RX ADMIN — TEZEPELUMAB-EKKO 210 MG: 210 INJECTION, SOLUTION SUBCUTANEOUS at 11:21

## 2024-07-01 ASSESSMENT — PAIN DESCRIPTION - DESCRIPTORS: DESCRIPTORS: ACHING

## 2024-07-01 ASSESSMENT — PAIN - FUNCTIONAL ASSESSMENT
PAIN_FUNCTIONAL_ASSESSMENT: 0-10
PAIN_FUNCTIONAL_ASSESSMENT: NONE - DENIES PAIN

## 2024-07-01 NOTE — PROGRESS NOTES
[facility] Progress Note    Date: 2024    Name: Nicol Couch    MRN: [id number]         : 1955    TEZSPIRE (Q 4 WEEKS)  (2ND DOSE, 1ST DOSE IN [facility])      Ms. Couch arrived to hospitals at 1045.    Ms. Couch was assessed and education was provided.     Ms. Couch's vitals were reviewed.  Vitals:    24 1051   BP: 118/74   Pulse: 70   Resp: 16   Temp: 99 °F (37.2 °C)   SpO2: 92%     Patient reports having previous injection of Tezspire in her Dr's office and she tolerated well. Tezspire patient education handout reviewed with patient, she has no further questions and signed acknowledgement form.    Patient reports taking Tobramycin every 28 days (last dose finished 2 weeks ago). consulted DOREEN Velez in pharmacy and she instructs that it is ok to administer Tezspire dose today. Patient denies any other infection symptoms.    210mg Tezspire was administered as ordered SQ in patient's left arm. Band aid placed to injection site.     Ms. Couch tolerated well without complaints. Patient remained in hospitals for 30 minutes for observation. At the end of observation, patient denies any side effects or adverse reaction symptoms. Temp 100.1 at end of observation. Repeated temperature in approximately ten minutes to read 98.9. Instructed patient to monitor at home and consult her physician if she experiences fever or other side effects, and go to ED for any emergent adverse reaction symptoms. She verbalizes understanding.    Discharge/ follow-up instructions discussed w/ pt. Pt verbalized understanding.    Pt armband removed & shredded.    [facility] at 1210.  She is to return on 24 at 11:30am for her next appointment.    Cornelia Lanza RN  2024

## 2024-07-24 ENCOUNTER — OFFICE VISIT (OUTPATIENT)
Facility: CLINIC | Age: 69
End: 2024-07-24
Payer: MEDICARE

## 2024-07-24 ENCOUNTER — HOSPITAL ENCOUNTER (OUTPATIENT)
Facility: HOSPITAL | Age: 69
Setting detail: SPECIMEN
Discharge: HOME | End: 2024-07-27
Payer: MEDICARE

## 2024-07-24 VITALS
DIASTOLIC BLOOD PRESSURE: 69 MMHG | HEIGHT: 62 IN | WEIGHT: 175 LBS | RESPIRATION RATE: 18 BRPM | HEART RATE: 94 BPM | SYSTOLIC BLOOD PRESSURE: 115 MMHG | TEMPERATURE: 99.1 F | OXYGEN SATURATION: 91 % | BODY MASS INDEX: 32.2 KG/M2

## 2024-07-24 DIAGNOSIS — M05.79 RHEUMATOID ARTHRITIS INVOLVING MULTIPLE SITES WITH POSITIVE RHEUMATOID FACTOR (HCC): ICD-10-CM

## 2024-07-24 DIAGNOSIS — I10 PRIMARY HYPERTENSION: ICD-10-CM

## 2024-07-24 DIAGNOSIS — J47.1 BRONCHIECTASIS WITH ACUTE EXACERBATION (HCC): ICD-10-CM

## 2024-07-24 DIAGNOSIS — J15.1: Primary | ICD-10-CM

## 2024-07-24 DIAGNOSIS — N39.0 URINARY TRACT INFECTION WITHOUT HEMATURIA, SITE UNSPECIFIED: ICD-10-CM

## 2024-07-24 LAB
BILIRUBIN, URINE, POC: NEGATIVE
BLOOD URINE, POC: NORMAL
GLUCOSE URINE, POC: NEGATIVE
KETONES, URINE, POC: NEGATIVE
LEUKOCYTE ESTERASE, URINE, POC: NORMAL
NITRITE, URINE, POC: NORMAL
PH, URINE, POC: 5.5 (ref 4.6–8)
PROTEIN,URINE, POC: NEGATIVE
SPECIFIC GRAVITY, URINE, POC: 1.02 (ref 1–1.03)
URINALYSIS CLARITY, POC: NORMAL
URINALYSIS COLOR, POC: YELLOW
UROBILINOGEN, POC: NORMAL

## 2024-07-24 PROCEDURE — G8399 PT W/DXA RESULTS DOCUMENT: HCPCS | Performed by: INTERNAL MEDICINE

## 2024-07-24 PROCEDURE — 3078F DIAST BP <80 MM HG: CPT | Performed by: INTERNAL MEDICINE

## 2024-07-24 PROCEDURE — 99215 OFFICE O/P EST HI 40 MIN: CPT | Performed by: INTERNAL MEDICINE

## 2024-07-24 PROCEDURE — 1123F ACP DISCUSS/DSCN MKR DOCD: CPT | Performed by: INTERNAL MEDICINE

## 2024-07-24 PROCEDURE — G8417 CALC BMI ABV UP PARAM F/U: HCPCS | Performed by: INTERNAL MEDICINE

## 2024-07-24 PROCEDURE — 81001 URINALYSIS AUTO W/SCOPE: CPT | Performed by: INTERNAL MEDICINE

## 2024-07-24 PROCEDURE — 3074F SYST BP LT 130 MM HG: CPT | Performed by: INTERNAL MEDICINE

## 2024-07-24 PROCEDURE — 1036F TOBACCO NON-USER: CPT | Performed by: INTERNAL MEDICINE

## 2024-07-24 PROCEDURE — 87086 URINE CULTURE/COLONY COUNT: CPT

## 2024-07-24 PROCEDURE — 3017F COLORECTAL CA SCREEN DOC REV: CPT | Performed by: INTERNAL MEDICINE

## 2024-07-24 PROCEDURE — G8427 DOCREV CUR MEDS BY ELIG CLIN: HCPCS | Performed by: INTERNAL MEDICINE

## 2024-07-24 PROCEDURE — 1090F PRES/ABSN URINE INCON ASSESS: CPT | Performed by: INTERNAL MEDICINE

## 2024-07-24 RX ORDER — SULFAMETHOXAZOLE AND TRIMETHOPRIM 800; 160 MG/1; MG/1
1 TABLET ORAL 2 TIMES DAILY
COMMUNITY
Start: 2024-07-20 | End: 2024-07-30

## 2024-07-24 NOTE — PROGRESS NOTES
Nicol Couch presents today for   Chief Complaint   Patient presents with    Follow-Up from Hospital     [facility], discharged 07/20/24: sepsis     Shortness of breath on exertion, weakness, and discomfort in lungs; felt better a couple days ago but now feeling worse.    \"Have you been to the ER, urgent care clinic since your last visit?  Hospitalized since your last visit?\"    YES - When: approximately 4 days ago.  Where and Why: [facility] 07/16-20, sepsis.    “Have you seen or consulted any other health care providers outside of [facility] since your last visit?”    NO    “Have you had a colorectal cancer screening such as a colonoscopy/FIT/Cologuard?    NO    Date of last Colonoscopy: 10/13/2017  No cologuard on file  No FIT/FOBT on file   No flexible sigmoidoscopy on file

## 2024-07-25 NOTE — PROGRESS NOTES
Patient being seen today for transitional care management    Patient was admitted to [facility] from 7/16-20/24              I thoroughly reviewed the discharge summary, notes, consults, labs and imaging studies in the electronic record.  Pertinent details are summarized below and the record has been updated to reflect recent events    Since our last encounter in late April, she has been followoing up with Dr Barreto.  Sputum cx from (5/23) grew out pseudomonas, ethmoid cx by Dr Decker (5/23) also grew out pseudomonas.  She had bronch (5/21/24) and they washed out copious amounts of purulent material.  Admitted 5/23/24 and seen by Dr Leonardo.  She was readmitted 7/16/24 after presenting with fever; dx/ed with pneumonia and sepsis.  Cx again showed pseudomonas but she was sent home on bactrim and continued on inhaled tobra.  They also told her she had uti but no cx sent.  Since d/c, she has not felt much different, spiked temp to 100.7 the other day.  She has follow up appt Dr Barreto tomorrow    Past Medical History:   Diagnosis Date    Agatston CAC score, <100 02/2022    ca score 16    Allergic rhinitis     Dr. Terrell on immunotherapy    Asthma with COPD (HCC)     FEV1/FVC 64%, preFEV1 51%, postFEV1 57%, %, DLCO 96% (2/15 - Dr Hodge) ; daliresp oin past ME'ed due to cost; Dr Barreto    Bronchiectasis (HCC)     since childhood, idiopathic; CF negx2, A1AT neg, IgE nl, aspergillus nAb neg, sputum AFB neg, PPD neg; Dr Hodge; now Dr Barreto    Colon adenoma 03/2012    Dr. Mcfarlane 3/12; 10/13/17 polyp    COVID-19 vaccine dose declined 02/2024    boosters    Depression 04/2015    PHQ-9 was 11/27, RUSH-7 was 5/21; did well on lexapro    Dry age-related macular degeneration 2023    Dr Barcenas    Dyslipidemia     calculated 10 year risk score was 2.5% (4/15), 3.9% (10/15); 5% (11/16); 5.9% (6/19); 6.8% (6/21); 8.3% (1/22)    GERD (gastroesophageal reflux disease) 05/1994    on egd;  study (4/23) mild dysmotility

## 2024-07-26 LAB
BACTERIA SPEC CULT: NORMAL
SERVICE CMNT-IMP: NORMAL

## 2024-07-29 ENCOUNTER — HOSPITAL ENCOUNTER (OUTPATIENT)
Facility: HOSPITAL | Age: 69
Setting detail: INFUSION SERIES
Discharge: HOME | End: 2024-08-01
Payer: MEDICARE

## 2024-07-29 VITALS
SYSTOLIC BLOOD PRESSURE: 112 MMHG | TEMPERATURE: 97.1 F | HEART RATE: 82 BPM | DIASTOLIC BLOOD PRESSURE: 62 MMHG | RESPIRATION RATE: 18 BRPM | OXYGEN SATURATION: 90 %

## 2024-07-29 DIAGNOSIS — J45.40 MODERATE PERSISTENT ASTHMA, UNSPECIFIED WHETHER COMPLICATED: Primary | ICD-10-CM

## 2024-07-29 PROCEDURE — 6360000002 HC RX W HCPCS: Performed by: INTERNAL MEDICINE

## 2024-07-29 PROCEDURE — 96372 THER/PROPH/DIAG INJ SC/IM: CPT

## 2024-07-29 RX ORDER — ONDANSETRON 2 MG/ML
8 INJECTION INTRAMUSCULAR; INTRAVENOUS
OUTPATIENT
Start: 2024-08-26

## 2024-07-29 RX ORDER — SODIUM CHLORIDE 9 MG/ML
INJECTION, SOLUTION INTRAVENOUS CONTINUOUS
OUTPATIENT
Start: 2024-08-26

## 2024-07-29 RX ORDER — ALBUTEROL SULFATE 90 UG/1
4 AEROSOL, METERED RESPIRATORY (INHALATION) PRN
OUTPATIENT
Start: 2024-08-26

## 2024-07-29 RX ORDER — ACETAMINOPHEN 325 MG/1
650 TABLET ORAL
OUTPATIENT
Start: 2024-08-26

## 2024-07-29 RX ORDER — EPINEPHRINE 1 MG/ML
0.3 INJECTION, SOLUTION, CONCENTRATE INTRAVENOUS PRN
OUTPATIENT
Start: 2024-08-26

## 2024-07-29 RX ORDER — DIPHENHYDRAMINE HYDROCHLORIDE 50 MG/ML
50 INJECTION INTRAMUSCULAR; INTRAVENOUS
OUTPATIENT
Start: 2024-08-26

## 2024-07-29 RX ADMIN — TEZEPELUMAB-EKKO 210 MG: 210 INJECTION, SOLUTION SUBCUTANEOUS at 11:37

## 2024-07-29 NOTE — PROGRESS NOTES
[facility] Progress Note    Date: 2024    Name: Nicol Couch    MRN: [id number]         : 1955    TEZSPIRE (Q 4 WEEKS)      Ms. Couch arrived to [facility] [place] at 1120.    Ms. Couch was assessed and education was provided.     Ms. Couch's vitals were reviewed.  Vitals:    24 1120   BP: 112/62   Pulse: 82   Resp: 18   Temp: 97.1 °F (36.2 °C)   SpO2: 90%     Patient reports not having any major complaints today.    Patient reports taking Bactrim 2x daily for pneumonia and will be done in 2 days. consulted DOREEN Velez in pharmacy and she instructs that it is ok to administer Tezspire dose today. Patient denies any other infection symptoms.   Educated patient to follow up with doctor if he feels her infection has not cleared after she finishes her antibiotics.     210 mg Tezspire was administered as ordered SQ in patient's RIGHT arm. Patient declines bandaid. Patient educated on common side effects of Tezspire after she asked for information, handout was provided.       Ms. Couch tolerated well without complaints.     Discharge/ follow-up instructions discussed w/ pt. Pt verbalized understanding.    Pt armband removed & shredded.    [facility] C[facility] condition at 1145.  She is to return on 24 at 1030 am for her next appointment.    Sindhu Olmstead RN  2024

## 2024-08-05 ENCOUNTER — TELEPHONE (OUTPATIENT)
Facility: CLINIC | Age: 69
End: 2024-08-05

## 2024-08-05 NOTE — TELEPHONE ENCOUNTER
Pt called in states she is waiting on the results of her urine test and would like a call back.     Please advise.

## 2024-08-22 ENCOUNTER — OFFICE VISIT (OUTPATIENT)
Facility: CLINIC | Age: 69
End: 2024-08-22

## 2024-08-22 VITALS
HEIGHT: 62 IN | SYSTOLIC BLOOD PRESSURE: 113 MMHG | TEMPERATURE: 98.6 F | HEART RATE: 84 BPM | OXYGEN SATURATION: 90 % | RESPIRATION RATE: 16 BRPM | WEIGHT: 176 LBS | DIASTOLIC BLOOD PRESSURE: 61 MMHG | BODY MASS INDEX: 32.39 KG/M2

## 2024-08-22 DIAGNOSIS — J45.40 MODERATE PERSISTENT ASTHMA, UNSPECIFIED WHETHER COMPLICATED: ICD-10-CM

## 2024-08-22 DIAGNOSIS — M05.79 RHEUMATOID ARTHRITIS INVOLVING MULTIPLE SITES WITH POSITIVE RHEUMATOID FACTOR (HCC): ICD-10-CM

## 2024-08-22 DIAGNOSIS — Z71.89 ADVANCE DIRECTIVE DISCUSSED WITH PATIENT: ICD-10-CM

## 2024-08-22 DIAGNOSIS — J47.1 BRONCHIECTASIS WITH ACUTE EXACERBATION (HCC): Primary | ICD-10-CM

## 2024-08-22 RX ORDER — PREDNISONE 5 MG/1
5 TABLET ORAL DAILY
COMMUNITY
Start: 2024-08-29

## 2024-08-22 RX ORDER — PREDNISONE 20 MG/1
TABLET ORAL
COMMUNITY
Start: 2024-08-17 | End: 2024-08-29

## 2024-08-22 RX ORDER — CEFDINIR 300 MG/1
300 CAPSULE ORAL 2 TIMES DAILY
COMMUNITY
Start: 2024-08-17 | End: 2024-08-22

## 2024-08-22 NOTE — PROGRESS NOTES
Nicol Angelaton presents today for   Chief Complaint   Patient presents with    Follow-Up from Hospital     08/15-08/17 [facility]; shortness of breath       \"Have you been to the ER, urgent care clinic since your last visit?  Hospitalized since your last visit?\"    YES - When: approximately 5 days ago.  Where and Why: [facility], shortness of breath.    “Have you seen or consulted any other health care providers outside of [facility] since your last visit?”    NO    “Have you had a colorectal cancer screening such as a colonoscopy/FIT/Cologuard?    NO    Date of last Colonoscopy: 10/13/2017  No cologuard on file  No FIT/FOBT on file   No flexible sigmoidoscopy on file

## 2024-08-23 NOTE — ACP (ADVANCE CARE PLANNING)
Advance Care Planning     Advance Care Planning (ACP) Physician/NP/PA Conversation    Date of Conversation: 8/22/2024  Conducted with: Patient with Decision Making Capacity    Healthcare Decision Maker:      Primary Decision Maker: Brigido Couch - Spouse - [phone]    Click here to complete Healthcare Decision Makers including selection of the Healthcare Decision Maker Relationship (ie \"Primary\")  Today we documented Decision Maker(s) consistent with Legal Next of Kin hierarchy.    Care Preferences:    Hospitalization:  \"If your health worsens and it becomes clear that your chance of recovery is unlikely, what would be your preference regarding hospitalization?\"  The patient would prefer hospitalization.    Ventilation:  \"If you were unable to breath on your own and your chance of recovery was unlikely, what would be your preference about the use of a ventilator (breathing machine) if it was available to you?\"  The patient is unsure.    Resuscitation:  \"In the event your heart stopped as a result of an underlying serious health condition, would you want attempts made to restart your heart, or would you prefer a natural death?\"  The patient declines resuscitation     ventilation preferences, hospitalization preferences, and resuscitation preferences    Conversation Outcomes / Follow-Up Plan:  ACP in process - information provided, considering goals and options  Reviewed DNR/DNI and patient confirms current DNR status - completed forms on file (place new order if needed)    Length of Voluntary ACP Conversation in minutes:  16 minutes    LUCIO SOTO MD

## 2024-08-23 NOTE — PROGRESS NOTES
Patient being seen today for transitional care management    Patient was admitted to [facility] from 8/15-17/24               I thoroughly reviewed the discharge summary, notes, consults, labs and imaging studies in the electronic record.  Pertinent details are summarized below and the record has been updated to reflect recent events    She's been hospitalized multiple times in the last CY for recurring infection.  She has severe bronchiectasis s/p left lobectomy, COPD O2 dependent, RA on immunosuppression. Presented with inc sob, prod cough, intermittent fever, pleurisy. Bmp ok, probnp min elevated, CTA neg PE and stable appearance w bilat atelectasis and consolidation w wall thickening, bronchiectasis, opacification and tree-in-bud nodularity.  Hs grown resistant psudomonas in past, started on cefepime/vanc but sent home on omnicef/zithromax.  Seen by Dr gaona, resp screen neg, continued acetylcysteine and tobra inhalation.  Robbie discussion about poor prognosis, they discussed going to bronchiectasis center in [place] but she declined.  Her aim is 'to make it to her son's wedding in May'.  She signed DNR forms and filed in [employer] system, verbally confirmed DNR status with me today.  Since d/c, she has felt better although did have tspike to 102 3d ago.  Has appt with Dr Barreto later next week.      Past Medical History:   Diagnosis Date    Agatston CAC score, <100 02/2022    ca score 16    Allergic rhinitis     Dr. Terrell on immunotherapy    Asthma with COPD (HCC)     FEV1/FVC 64%, preFEV1 51%, postFEV1 57%, %, DLCO 96% (2/15 - Dr Hodge) ; hodan oipilar past IN'ed due to cost; Dr Barreto    Bronchiectasis (HCC)     since childhood, idiopathic; CF negx2, A1AT neg, IgE nl, aspergillus nAb neg, sputum AFB neg, PPD neg; Dr Hodge; now Dr Barreto    Colon adenoma 03/2012    Dr. Mcfarlane 3/12; 10/13/17 polyp    COVID-19 vaccine dose declined 02/2024    boosters    Depression 04/2015    PHQ-9 was 11/27, RUSH-7 was

## 2024-08-26 ENCOUNTER — HOSPITAL ENCOUNTER (OUTPATIENT)
Facility: HOSPITAL | Age: 69
Setting detail: INFUSION SERIES
Discharge: HOME | End: 2024-08-29
Payer: MEDICARE

## 2024-08-26 VITALS
RESPIRATION RATE: 16 BRPM | DIASTOLIC BLOOD PRESSURE: 74 MMHG | HEART RATE: 72 BPM | TEMPERATURE: 100.6 F | OXYGEN SATURATION: 92 % | SYSTOLIC BLOOD PRESSURE: 143 MMHG

## 2024-08-26 PROCEDURE — 99211 OFF/OP EST MAY X REQ PHY/QHP: CPT

## 2024-08-26 ASSESSMENT — PAIN - FUNCTIONAL ASSESSMENT: PAIN_FUNCTIONAL_ASSESSMENT: NONE - DENIES PAIN

## 2024-08-26 NOTE — PROGRESS NOTES
[facility] Progress Note    Date: 2024    Name: Nicol Couch    MRN: [id number]         : 1955      TEZSPIRE (Q 4 WEEKS)      Ms. Couch arrived to [facility] at 1025.    Ms. Couch was assessed and education was provided.     Ms. Couch's vitals were reviewed.  Vitals:    24 1026   BP: (!) 143/74   Pulse: 72   Resp: 16   Temp: (!) 100.6 °F (38.1 °C)   SpO2: 92%     Patient reports a recent hospitalization d/t shortness of breath, states she went home on azithromycin and cefdinir and finished them 24. Patient noted to have slight fever. Spoke with Josefina Hernandez RPH regarding fever and Tezspire administration today, she instructs to call provider.     Attempted to contact provider's office without success. Patient very politely reports she is seeing Dr. Barreto soon and would rather discuss discontinuing Tezspire due to not noticing improvement. [facility].    Patient did NOT receive Tezspire injection today.    Discharge/ follow-up instructions discussed w/ pt. Pt verbalized understanding.    [facility] at 1050.  She is currently scheduled to return on 24 at 11:30am for her next appointment.    **Called provider's office again at 1152, was able to speak with  Marija who states she would leave a message with Dr. Barreto regarding patient's missed dose today and how patient is feeling about the Tezspire shots.    Cornelia Lanza RN  2024

## 2024-09-23 ENCOUNTER — HOSPITAL ENCOUNTER (OUTPATIENT)
Facility: HOSPITAL | Age: 69
Setting detail: INFUSION SERIES
End: 2024-09-23

## 2024-10-08 DIAGNOSIS — I10 PRIMARY HYPERTENSION: ICD-10-CM

## 2024-10-11 RX ORDER — LOSARTAN POTASSIUM 50 MG/1
50 TABLET ORAL DAILY
Qty: 90 TABLET | Refills: 3 | Status: SHIPPED | OUTPATIENT
Start: 2024-10-11

## 2024-12-31 ENCOUNTER — PATIENT MESSAGE (OUTPATIENT)
Facility: CLINIC | Age: 69
End: 2024-12-31

## 2024-12-31 DIAGNOSIS — R39.9 UTI SYMPTOMS: Primary | ICD-10-CM

## 2024-12-31 NOTE — TELEPHONE ENCOUNTER
Called Pt Pt unable to come in before closing for urine dip and culture. Advised Pt to go to urgent care for treatment. Pt declined recommendation for urgent care and states will call us Thursday morning if symptoms worsen or fail to improve.

## 2025-01-02 ENCOUNTER — TELEPHONE (OUTPATIENT)
Facility: CLINIC | Age: 70
End: 2025-01-02

## 2025-01-03 NOTE — TELEPHONE ENCOUNTER
Patient has lab orders in chart, placed by Dr. Louis, to be completed prior to upcoming appointment.    No further action required.

## 2025-01-16 ENCOUNTER — TELEPHONE (OUTPATIENT)
Facility: CLINIC | Age: 70
End: 2025-01-16

## 2025-01-16 NOTE — TELEPHONE ENCOUNTER
Labs printed and given to  to fax to [facility], per patient request.    No further action required.

## 2025-01-16 NOTE — TELEPHONE ENCOUNTER
Patient wants to get labs done at [facility], are there any other labs that I need to fax to them or just the Urine Culture, please advise. She would like to go to [facility] tomorrow for labs

## 2025-01-31 LAB
ANION GAP SERPL CALCULATED.3IONS-SCNC: 11 MMOL/L (ref 3–15)
BUN BLDV-MCNC: 11 MG/DL (ref 6–22)
CALCIUM SERPL-MCNC: 9.5 MG/DL (ref 8.4–10.5)
CHLORIDE BLD-SCNC: 99 MMOL/L (ref 98–110)
CHOLESTEROL, TOTAL: 137 MG/DL (ref 110–200)
CHOLESTEROL/HDL RATIO: 1.9 (ref 0–5)
CO2: 28 MMOL/L (ref 20–32)
CREAT SERPL-MCNC: 0.5 MG/DL (ref 0.8–1.4)
GFR, ESTIMATED: >60 ML/MIN/1.73 SQ.M.
GLUCOSE: 102 MG/DL (ref 70–99)
HDLC SERPL-MCNC: 71 MG/DL
LDL CHOLESTEROL: 49 MG/DL (ref 50–99)
LDL/HDL RATIO: 0.7
NON-HDL CHOLESTEROL: 66 MG/DL
POTASSIUM SERPL-SCNC: 4.2 MMOL/L (ref 3.5–5.5)
SODIUM BLD-SCNC: 138 MMOL/L (ref 133–145)
TRIGL SERPL-MCNC: 85 MG/DL (ref 40–149)
VLDLC SERPL CALC-MCNC: 17 MG/DL (ref 8–30)

## 2025-02-01 LAB
ESTIMATED AVERAGE GLUCOSE: 110 MG/DL (ref 91–123)
HBA1C MFR BLD: 5.5 % (ref 4.8–5.6)

## 2025-02-03 SDOH — HEALTH STABILITY: PHYSICAL HEALTH: ON AVERAGE, HOW MANY DAYS PER WEEK DO YOU ENGAGE IN MODERATE TO STRENUOUS EXERCISE (LIKE A BRISK WALK)?: 0 DAYS

## 2025-02-03 SDOH — ECONOMIC STABILITY: FOOD INSECURITY: WITHIN THE PAST 12 MONTHS, THE FOOD YOU BOUGHT JUST DIDN'T LAST AND YOU DIDN'T HAVE MONEY TO GET MORE.: NEVER TRUE

## 2025-02-03 SDOH — ECONOMIC STABILITY: FOOD INSECURITY: WITHIN THE PAST 12 MONTHS, YOU WORRIED THAT YOUR FOOD WOULD RUN OUT BEFORE YOU GOT MONEY TO BUY MORE.: NEVER TRUE

## 2025-02-03 SDOH — ECONOMIC STABILITY: TRANSPORTATION INSECURITY
IN THE PAST 12 MONTHS, HAS LACK OF TRANSPORTATION KEPT YOU FROM MEETINGS, WORK, OR FROM GETTING THINGS NEEDED FOR DAILY LIVING?: NO

## 2025-02-03 SDOH — ECONOMIC STABILITY: INCOME INSECURITY: IN THE LAST 12 MONTHS, WAS THERE A TIME WHEN YOU WERE NOT ABLE TO PAY THE MORTGAGE OR RENT ON TIME?: NO

## 2025-02-03 SDOH — ECONOMIC STABILITY: TRANSPORTATION INSECURITY
IN THE PAST 12 MONTHS, HAS THE LACK OF TRANSPORTATION KEPT YOU FROM MEDICAL APPOINTMENTS OR FROM GETTING MEDICATIONS?: NO

## 2025-02-03 SDOH — HEALTH STABILITY: PHYSICAL HEALTH: ON AVERAGE, HOW MANY MINUTES DO YOU ENGAGE IN EXERCISE AT THIS LEVEL?: 0 MIN

## 2025-02-03 ASSESSMENT — PATIENT HEALTH QUESTIONNAIRE - PHQ9
SUM OF ALL RESPONSES TO PHQ QUESTIONS 1-9: 0
SUM OF ALL RESPONSES TO PHQ9 QUESTIONS 1 & 2: 0
2. FEELING DOWN, DEPRESSED OR HOPELESS: NOT AT ALL
SUM OF ALL RESPONSES TO PHQ QUESTIONS 1-9: 0
SUM OF ALL RESPONSES TO PHQ QUESTIONS 1-9: 0
1. LITTLE INTEREST OR PLEASURE IN DOING THINGS: NOT AT ALL
SUM OF ALL RESPONSES TO PHQ QUESTIONS 1-9: 0

## 2025-02-03 ASSESSMENT — LIFESTYLE VARIABLES
HOW OFTEN DO YOU HAVE A DRINK CONTAINING ALCOHOL: NEVER
HOW MANY STANDARD DRINKS CONTAINING ALCOHOL DO YOU HAVE ON A TYPICAL DAY: PATIENT DOES NOT DRINK
HOW OFTEN DO YOU HAVE SIX OR MORE DRINKS ON ONE OCCASION: 1
HOW OFTEN DO YOU HAVE A DRINK CONTAINING ALCOHOL: 1
HOW MANY STANDARD DRINKS CONTAINING ALCOHOL DO YOU HAVE ON A TYPICAL DAY: 0

## 2025-02-06 ENCOUNTER — OFFICE VISIT (OUTPATIENT)
Facility: CLINIC | Age: 70
End: 2025-02-06

## 2025-02-06 VITALS
WEIGHT: 174 LBS | RESPIRATION RATE: 18 BRPM | HEIGHT: 62 IN | BODY MASS INDEX: 32.02 KG/M2 | TEMPERATURE: 98.6 F | HEART RATE: 86 BPM | SYSTOLIC BLOOD PRESSURE: 112 MMHG | DIASTOLIC BLOOD PRESSURE: 66 MMHG | OXYGEN SATURATION: 90 %

## 2025-02-06 DIAGNOSIS — K21.9 GASTROESOPHAGEAL REFLUX DISEASE WITHOUT ESOPHAGITIS: ICD-10-CM

## 2025-02-06 DIAGNOSIS — E78.5 DYSLIPIDEMIA: ICD-10-CM

## 2025-02-06 DIAGNOSIS — K75.81 METABOLIC DYSFUNCTION-ASSOCIATED STEATOHEPATITIS (MASH): ICD-10-CM

## 2025-02-06 DIAGNOSIS — J44.89 ASTHMA WITH COPD (HCC): ICD-10-CM

## 2025-02-06 DIAGNOSIS — R73.02 IGT (IMPAIRED GLUCOSE TOLERANCE): ICD-10-CM

## 2025-02-06 DIAGNOSIS — I10 PRIMARY HYPERTENSION: ICD-10-CM

## 2025-02-06 DIAGNOSIS — Z00.00 MEDICARE ANNUAL WELLNESS VISIT, SUBSEQUENT: Primary | ICD-10-CM

## 2025-02-06 DIAGNOSIS — M05.79 RHEUMATOID ARTHRITIS INVOLVING MULTIPLE SITES WITH POSITIVE RHEUMATOID FACTOR (HCC): ICD-10-CM

## 2025-02-06 DIAGNOSIS — J47.1 BRONCHIECTASIS WITH ACUTE EXACERBATION (HCC): ICD-10-CM

## 2025-02-06 RX ORDER — LOSARTAN POTASSIUM 50 MG/1
50 TABLET ORAL DAILY
Qty: 90 TABLET | Refills: 3 | Status: SHIPPED | OUTPATIENT
Start: 2025-02-06

## 2025-02-06 RX ORDER — ROSUVASTATIN CALCIUM 5 MG/1
5 TABLET, COATED ORAL DAILY
Qty: 90 TABLET | Refills: 3 | Status: SHIPPED | OUTPATIENT
Start: 2025-02-06

## 2025-02-06 SDOH — ECONOMIC STABILITY: FOOD INSECURITY: WITHIN THE PAST 12 MONTHS, THE FOOD YOU BOUGHT JUST DIDN'T LAST AND YOU DIDN'T HAVE MONEY TO GET MORE.: NEVER TRUE

## 2025-02-06 SDOH — ECONOMIC STABILITY: FOOD INSECURITY: WITHIN THE PAST 12 MONTHS, YOU WORRIED THAT YOUR FOOD WOULD RUN OUT BEFORE YOU GOT MONEY TO BUY MORE.: NEVER TRUE

## 2025-02-07 ENCOUNTER — TELEPHONE (OUTPATIENT)
Facility: CLINIC | Age: 70
End: 2025-02-07

## 2025-02-07 NOTE — PROGRESS NOTES
Medicare Annual Wellness Visit    Nicol Couch is here for Medicare AWV    Assessment & Plan   Primary hypertension  -     losartan (COZAAR) 50 MG tablet; Take 1 tablet by mouth daily, Disp-90 tablet, R-3Normal  Asthma with COPD (HCC)  Bronchiectasis with acute exacerbation (HCC)  Metabolic dysfunction-associated steatohepatitis (MASH)  Gastroesophageal reflux disease without esophagitis  IGT (impaired glucose tolerance)  -     Comprehensive Metabolic Panel; Future  -     HEMOGLOBIN A1C W/O EAG; Future  Rheumatoid arthritis involving multiple sites with positive rheumatoid factor (HCC)  -     CBC with Auto Differential; Future  Dyslipidemia  Medicare annual wellness visit, subsequent       Return in 1 year (on 2/6/2026) for Medicare AWV.     Subjective       Patient's complete Health Risk Assessment and screening values have been reviewed and are found in Flowsheets. The following problems were reviewed today and where indicated follow up appointments were made and/or referrals ordered.    Positive Risk Factor Screenings with Interventions:              Inactivity:  On average, how many days per week do you engage in moderate to strenuous exercise (like a brisk walk)?: 0 days (!) Abnormal  On average, how many minutes do you engage in exercise at this level?: 0 min  Interventions:  Patient declined any further interventions or treatment    Poor Eating Habits/Diet:  Do you eat balanced/healthy meals regularly?: (!) No  Interventions:  Patient declines any further evaluation or treatment    Abnormal BMI (obese):  Body mass index is 31.83 kg/m². (!) Abnormal  Interventions:  Patient declines any further evaluation or treatment          Vision Screen:  Do you have difficulty driving, watching TV, or doing any of your daily activities because of your eyesight?: (!) Yes  Have you had an eye exam within the past year?: Yes  Interventions:   Patient declines any further evaluation or treatment   
Nicol Couch presents today for   Chief Complaint   Patient presents with    Medicare AWV   Patient is on 2 liters        \"Have you been to the ER, urgent care clinic since your last visit?  Hospitalized since your last visit?\"    NO    “Have you seen or consulted any other health care providers outside of [facility] since your last visit?”    NO    “Have you had a colorectal cancer screening such as a colonoscopy/FIT/Cologuard?    NO    Date of last Colonoscopy: 10/13/2017  No cologuard on file  No FIT/FOBT on file   No flexible sigmoidoscopy on file             
Follow up Dr Horton  9. IGT.  Dietary and lifestyle measures  10.  Colonoscopy and mammo declined by pt for now        RTC 7/25    Above conditions discussed at length and patient vocalized understanding.  All questions answered to patient satifaction     Diagnosis Orders   1. Primary hypertension  losartan (COZAAR) 50 MG tablet      2. Asthma with COPD (HCC)        3. Bronchiectasis with acute exacerbation (HCC)        4. Metabolic dysfunction-associated steatohepatitis (MASH)        5. Gastroesophageal reflux disease without esophagitis        6. IGT (impaired glucose tolerance)  Comprehensive Metabolic Panel    HEMOGLOBIN A1C W/O EAG      7. Rheumatoid arthritis involving multiple sites with positive rheumatoid factor (HCC)  CBC with Auto Differential      8. Dyslipidemia

## 2025-02-07 NOTE — PATIENT INSTRUCTIONS
2/6/2025  Medicare offers a range of preventive health benefits. Some of the tests and screenings are paid in full while other may be subject to a deductible, co-insurance, and/or copay.  Some of these benefits include a comprehensive review of your medical history including lifestyle, illnesses that may run in your family, and various assessments and screenings as appropriate.  After reviewing your medical record and screening and assessments performed today your provider may have ordered immunizations, labs, imaging, and/or referrals for you.  A list of these orders (if applicable) as well as your Preventive Care list are included within your After Visit Summary for your review.

## 2025-03-25 ENCOUNTER — TRANSCRIBE ORDERS (OUTPATIENT)
Facility: HOSPITAL | Age: 70
End: 2025-03-25

## 2025-03-25 DIAGNOSIS — K76.0 NAFLD (NONALCOHOLIC FATTY LIVER DISEASE): Primary | ICD-10-CM

## 2025-03-25 DIAGNOSIS — K76.9 LIVER LESION: ICD-10-CM

## 2025-04-24 ENCOUNTER — HOSPITAL ENCOUNTER (OUTPATIENT)
Facility: HOSPITAL | Age: 70
Discharge: HOME | End: 2025-04-27
Attending: INTERNAL MEDICINE
Payer: MEDICARE

## 2025-04-24 DIAGNOSIS — K76.0 NAFLD (NONALCOHOLIC FATTY LIVER DISEASE): ICD-10-CM

## 2025-04-24 DIAGNOSIS — K76.9 LIVER LESION: ICD-10-CM

## 2025-04-24 PROCEDURE — 76981 USE PARENCHYMA: CPT

## 2025-05-28 ENCOUNTER — OFFICE VISIT (OUTPATIENT)
Facility: CLINIC | Age: 70
End: 2025-05-28
Payer: MEDICARE

## 2025-05-28 VITALS
SYSTOLIC BLOOD PRESSURE: 116 MMHG | WEIGHT: 166 LBS | BODY MASS INDEX: 30.55 KG/M2 | RESPIRATION RATE: 18 BRPM | OXYGEN SATURATION: 89 % | HEIGHT: 62 IN | TEMPERATURE: 97.5 F | DIASTOLIC BLOOD PRESSURE: 70 MMHG | HEART RATE: 69 BPM

## 2025-05-28 DIAGNOSIS — G62.9 NEUROPATHY: Primary | ICD-10-CM

## 2025-05-28 PROCEDURE — 3074F SYST BP LT 130 MM HG: CPT | Performed by: INTERNAL MEDICINE

## 2025-05-28 PROCEDURE — G8399 PT W/DXA RESULTS DOCUMENT: HCPCS | Performed by: INTERNAL MEDICINE

## 2025-05-28 PROCEDURE — 1123F ACP DISCUSS/DSCN MKR DOCD: CPT | Performed by: INTERNAL MEDICINE

## 2025-05-28 PROCEDURE — 1159F MED LIST DOCD IN RCRD: CPT | Performed by: INTERNAL MEDICINE

## 2025-05-28 PROCEDURE — G8417 CALC BMI ABV UP PARAM F/U: HCPCS | Performed by: INTERNAL MEDICINE

## 2025-05-28 PROCEDURE — 99213 OFFICE O/P EST LOW 20 MIN: CPT | Performed by: INTERNAL MEDICINE

## 2025-05-28 PROCEDURE — 1036F TOBACCO NON-USER: CPT | Performed by: INTERNAL MEDICINE

## 2025-05-28 PROCEDURE — G8427 DOCREV CUR MEDS BY ELIG CLIN: HCPCS | Performed by: INTERNAL MEDICINE

## 2025-05-28 PROCEDURE — 3017F COLORECTAL CA SCREEN DOC REV: CPT | Performed by: INTERNAL MEDICINE

## 2025-05-28 PROCEDURE — 3078F DIAST BP <80 MM HG: CPT | Performed by: INTERNAL MEDICINE

## 2025-05-28 PROCEDURE — 1090F PRES/ABSN URINE INCON ASSESS: CPT | Performed by: INTERNAL MEDICINE

## 2025-05-28 RX ORDER — TIOTROPIUM BROMIDE 18 UG/1
18 CAPSULE ORAL; RESPIRATORY (INHALATION) DAILY
COMMUNITY

## 2025-05-28 RX ORDER — BUDESONIDE AND FORMOTEROL FUMARATE DIHYDRATE 160; 4.5 UG/1; UG/1
2 AEROSOL RESPIRATORY (INHALATION) 2 TIMES DAILY
COMMUNITY
Start: 2025-03-26 | End: 2026-03-26

## 2025-05-28 NOTE — PROGRESS NOTES
69 y.o. female who presents for evaluation.    She is requesting to get a referral to see a neurologist, specifically Dr. Hernandes.  She feels that she has neuropathy.  For the last several months, she has been having some tingling left greater than right foot and foot pads.  She feels that the left foot is not moving correctly and she has a strange sensation with it.  She actually saw podiatry about 3 weeks ago, x-rays were negative.  She has had a couple of stumbles but does not feel imbalanced.  She uses a walker mainly to hold her oxygen tank or concentrator.  Her bronchiectasis/CF is improved.  No prior neurologic issues diagnosed.    Past Medical History:   Diagnosis Date    Agatston CAC score, <100 02/2022    ca score 16    Allergic rhinitis     Dr. Terrell on immunotherapy    Asthma with COPD ([facility])     FEV1/FVC 64%, preFEV1 51%, postFEV1 57%, %, DLCO 96% (2/15 - Dr Hodge) ; daliresp oin past dc'ed due to cost; Dr Barreto    Bronchiectasis (HCC)     since childhood, idiopathic; CF negx2, A1AT neg, IgE nl, aspergillus nAb neg, sputum AFB neg, PPD neg; Dr Hodge; now Dr Barreto; Dr. Jain/KENDRA CF 5T variant positive    Colon adenoma 03/2012    Dr. Mcfarlane 3/12; 10/13/17 polyp    COVID-19 vaccine dose declined 02/2024    boosters    Depression 04/2015    PHQ-9 was 11/27, RUSH-7 was 5/21; did well on lexapro    Do not resuscitate 08/22/2024    Dry age-related macular degeneration 2023    Dr Barcenas    Dyslipidemia     calculated 10 year risk score was 2.5% (4/15), 3.9% (10/15); 5% (11/16); 5.9% (6/19); 6.8% (6/21); 8.3% (1/22)    GERD (gastroesophageal reflux disease) 05/1994    on egd; sw study (4/23) mild dysmotility without stricture, sm HH w spont reflux PA Dean    H/O echocardiogram     nl lv, ef 55%, nl wma, mild mr (2/15) [facility]    Hypertension     IGT (impaired glucose tolerance)     Insomnia     no response to trazodone, ambjavad restoril    Lipoma of right axilla 08/2022    Dr Urbina s/p 
Nicol Juni Khoa presents today for   Chief Complaint   Patient presents with    Referral - General     neurology       \"Have you been to the ER, urgent care clinic since your last visit?  Hospitalized since your last visit?\"    NO    “Have you seen or consulted any other health care providers outside of [facility] since your last visit?”    YES - When: approximately 2 months ago.  Where and Why: [facility], follow-up.    “Have you had a colorectal cancer screening such as a colonoscopy/FIT/Cologuard?    NO    Date of last Colonoscopy: 10/13/2017  No cologuard on file  No FIT/FOBT on file   No flexible sigmoidoscopy on file        Have you had a mammogram?”   NO    Date of last Mammogram: 4/25/2023           
show

## 2025-06-18 ENCOUNTER — OFFICE VISIT (OUTPATIENT)
Facility: CLINIC | Age: 70
End: 2025-06-18
Payer: MEDICARE

## 2025-06-18 VITALS
SYSTOLIC BLOOD PRESSURE: 138 MMHG | DIASTOLIC BLOOD PRESSURE: 81 MMHG | BODY MASS INDEX: 31.83 KG/M2 | OXYGEN SATURATION: 86 % | HEIGHT: 62 IN | TEMPERATURE: 98.3 F | WEIGHT: 173 LBS | HEART RATE: 82 BPM | RESPIRATION RATE: 18 BRPM

## 2025-06-18 DIAGNOSIS — I10 PRIMARY HYPERTENSION: ICD-10-CM

## 2025-06-18 DIAGNOSIS — J47.9 BRONCHIECTASIS WITHOUT COMPLICATION (HCC): ICD-10-CM

## 2025-06-18 DIAGNOSIS — R06.02 SHORTNESS OF BREATH: Primary | ICD-10-CM

## 2025-06-18 DIAGNOSIS — J44.89 ASTHMA WITH COPD (HCC): ICD-10-CM

## 2025-06-18 DIAGNOSIS — M05.79 RHEUMATOID ARTHRITIS INVOLVING MULTIPLE SITES WITH POSITIVE RHEUMATOID FACTOR (HCC): ICD-10-CM

## 2025-06-18 PROCEDURE — 1090F PRES/ABSN URINE INCON ASSESS: CPT | Performed by: INTERNAL MEDICINE

## 2025-06-18 PROCEDURE — 1159F MED LIST DOCD IN RCRD: CPT | Performed by: INTERNAL MEDICINE

## 2025-06-18 PROCEDURE — G8417 CALC BMI ABV UP PARAM F/U: HCPCS | Performed by: INTERNAL MEDICINE

## 2025-06-18 PROCEDURE — 3023F SPIROM DOC REV: CPT | Performed by: INTERNAL MEDICINE

## 2025-06-18 PROCEDURE — 1123F ACP DISCUSS/DSCN MKR DOCD: CPT | Performed by: INTERNAL MEDICINE

## 2025-06-18 PROCEDURE — 1036F TOBACCO NON-USER: CPT | Performed by: INTERNAL MEDICINE

## 2025-06-18 PROCEDURE — 3017F COLORECTAL CA SCREEN DOC REV: CPT | Performed by: INTERNAL MEDICINE

## 2025-06-18 PROCEDURE — G8427 DOCREV CUR MEDS BY ELIG CLIN: HCPCS | Performed by: INTERNAL MEDICINE

## 2025-06-18 PROCEDURE — 3075F SYST BP GE 130 - 139MM HG: CPT | Performed by: INTERNAL MEDICINE

## 2025-06-18 PROCEDURE — G8399 PT W/DXA RESULTS DOCUMENT: HCPCS | Performed by: INTERNAL MEDICINE

## 2025-06-18 PROCEDURE — 99214 OFFICE O/P EST MOD 30 MIN: CPT | Performed by: INTERNAL MEDICINE

## 2025-06-18 PROCEDURE — 3079F DIAST BP 80-89 MM HG: CPT | Performed by: INTERNAL MEDICINE

## 2025-06-18 RX ORDER — LOSARTAN POTASSIUM 100 MG/1
100 TABLET ORAL DAILY
Qty: 90 TABLET | Refills: 1 | Status: SHIPPED | OUTPATIENT
Start: 2025-06-18

## 2025-06-18 NOTE — PROGRESS NOTES
69 y.o. female who presents for evaluation.    She is concerned that her blood pressures have been higher over the last couple weeks.  Getting 150s to 160s or even higher over 80s generally.  Review of readings in the office show mostly below 130 systolic the last multiple visits.  She think she is more short of breath as well.  Denies chest pain, headache, focal neurologic complaints, pleurisy, hemoptysis, PND, orthopnea, edema.  She has appointment with Dr. Barreto within the next few days.  She is wondering if she needs another echo to look for pulmonary hypertension as well, last 1 was in 2015.  Her machie has been calibrated per pt    Past Medical History:   Diagnosis Date    Agatston CAC score, <100 02/2022    ca score 16    Allergic rhinitis     Dr. Terrell on immunotherapy    Asthma with COPD (HCC)     FEV1/FVC 64%, preFEV1 51%, postFEV1 57%, %, DLCO 96% (2/15 - Dr Hodge) ; hodan oipilar past dc'ed due to cost; Dr Barreto    Bronchiectasis (HCC)     since childhood, idiopathic; CF negx2, A1AT neg, IgE nl, aspergillus nAb neg, sputum AFB neg, PPD neg; Dr Hodge; now Dr Barreto; Dr. Jain/KENDRA CF 5T variant positive    Colon adenoma 03/2012    Dr. Mcfarlane 3/12; 10/13/17 polyp    COVID-19 vaccine dose declined 02/2024    boosters    Depression 04/2015    PHQ-9 was 11/27, RUSH-7 was 5/21; did well on lexapro    Do not resuscitate 08/22/2024    Dry age-related macular degeneration 2023    Dr Barcenas    Dyslipidemia     calculated 10 year risk score was 2.5% (4/15), 3.9% (10/15); 5% (11/16); 5.9% (6/19); 6.8% (6/21); 8.3% (1/22)    GERD (gastroesophageal reflux disease) 05/1994    on egd; sw study (4/23) mild dysmotility without stricture, sm HH w spont reflux PA Dean    H/O echocardiogram     nl lv, ef 55%, nl wma, mild mr (2/15) [facility]    Hypertension     IGT (impaired glucose tolerance)     Insomnia     no response to trazodone, ambien, restoril    Lipoma of right axilla 08/2022    Dr Urbina s/p

## 2025-06-18 NOTE — PROGRESS NOTES
Nicol Alfredo Couch presents today for   Chief Complaint   Patient presents with    Blood Pressure Check     Fluctuating; home       \"Have you been to the ER, urgent care clinic since your last visit?  Hospitalized since your last visit?\"    NO    “Have you seen or consulted any other health care providers outside of [facility] since your last visit?”    YES - When: approximately 1 days ago.  Where and Why: Rheumatology.    “Have you had a colorectal cancer screening such as a colonoscopy/FIT/Cologuard?    NO    Date of last Colonoscopy: 10/13/2017  No cologuard on file  No FIT/FOBT on file   No flexible sigmoidoscopy on file        Have you had a mammogram?”   NO    Date of last Mammogram: 4/25/2023

## 2025-07-10 ENCOUNTER — HOSPITAL ENCOUNTER (OUTPATIENT)
Facility: HOSPITAL | Age: 70
Discharge: HOME | End: 2025-07-12
Attending: INTERNAL MEDICINE
Payer: MEDICARE

## 2025-07-10 VITALS
DIASTOLIC BLOOD PRESSURE: 81 MMHG | SYSTOLIC BLOOD PRESSURE: 138 MMHG | BODY MASS INDEX: 31.83 KG/M2 | HEIGHT: 62 IN | WEIGHT: 173 LBS

## 2025-07-10 DIAGNOSIS — R06.02 SHORTNESS OF BREATH: ICD-10-CM

## 2025-07-10 LAB
ECHO AO ASC DIAM: 3.4 CM
ECHO AO ASCENDING AORTA INDEX: 1.89 CM/M2
ECHO AO ROOT DIAM: 2.9 CM
ECHO AO ROOT INDEX: 1.61 CM/M2
ECHO AV AREA PEAK VELOCITY: 1.4 CM2
ECHO AV AREA VTI: 1.7 CM2
ECHO AV AREA/BSA PEAK VELOCITY: 0.8 CM2/M2
ECHO AV AREA/BSA VTI: 0.9 CM2/M2
ECHO AV MEAN GRADIENT: 7 MMHG
ECHO AV MEAN VELOCITY: 1.2 M/S
ECHO AV PEAK GRADIENT: 13 MMHG
ECHO AV PEAK VELOCITY: 1.8 M/S
ECHO AV VELOCITY RATIO: 0.5
ECHO AV VTI: 37.5 CM
ECHO BSA: 1.85 M2
ECHO EST RA PRESSURE: 3 MMHG
ECHO LA VOL A-L A2C: 45 ML (ref 22–52)
ECHO LA VOL A-L A4C: 31 ML (ref 22–52)
ECHO LA VOL BP: 34 ML (ref 22–52)
ECHO LA VOL MOD A2C: 40 ML (ref 22–52)
ECHO LA VOL MOD A4C: 28 ML (ref 22–52)
ECHO LA VOL/BSA BIPLANE: 19 ML/M2 (ref 16–34)
ECHO LA VOLUME AREA LENGTH: 38 ML
ECHO LA VOLUME INDEX A-L A2C: 25 ML/M2 (ref 16–34)
ECHO LA VOLUME INDEX A-L A4C: 17 ML/M2 (ref 16–34)
ECHO LA VOLUME INDEX AREA LENGTH: 21 ML/M2 (ref 16–34)
ECHO LA VOLUME INDEX MOD A2C: 22 ML/M2 (ref 16–34)
ECHO LA VOLUME INDEX MOD A4C: 16 ML/M2 (ref 16–34)
ECHO LV E' LATERAL VELOCITY: 10.36 CM/S
ECHO LV E' SEPTAL VELOCITY: 3.65 CM/S
ECHO LV EDV A2C: 72 ML
ECHO LV EDV A4C: 57 ML
ECHO LV EDV BP: 68 ML (ref 56–104)
ECHO LV EDV INDEX A4C: 32 ML/M2
ECHO LV EDV INDEX BP: 38 ML/M2
ECHO LV EDV NDEX A2C: 40 ML/M2
ECHO LV EF PHYSICIAN: 60 %
ECHO LV EJECTION FRACTION A2C: 59 %
ECHO LV EJECTION FRACTION A4C: 60 %
ECHO LV EJECTION FRACTION BIPLANE: 62 % (ref 55–100)
ECHO LV ESV A2C: 30 ML
ECHO LV ESV A4C: 23 ML
ECHO LV ESV BP: 26 ML (ref 19–49)
ECHO LV ESV INDEX A2C: 17 ML/M2
ECHO LV ESV INDEX A4C: 13 ML/M2
ECHO LV ESV INDEX BP: 14 ML/M2
ECHO LV FRACTIONAL SHORTENING: 42 % (ref 28–44)
ECHO LV INTERNAL DIMENSION DIASTOLE INDEX: 2.5 CM/M2
ECHO LV INTERNAL DIMENSION DIASTOLIC: 4.5 CM (ref 3.9–5.3)
ECHO LV INTERNAL DIMENSION SYSTOLIC INDEX: 1.44 CM/M2
ECHO LV INTERNAL DIMENSION SYSTOLIC: 2.6 CM
ECHO LV IVSD: 1 CM (ref 0.6–0.9)
ECHO LV MASS 2D: 132.8 G (ref 67–162)
ECHO LV MASS INDEX 2D: 73.8 G/M2 (ref 43–95)
ECHO LV POSTERIOR WALL DIASTOLIC: 0.8 CM (ref 0.6–0.9)
ECHO LV RELATIVE WALL THICKNESS RATIO: 0.36
ECHO LVOT AREA: 2.8 CM2
ECHO LVOT AV VTI INDEX: 0.6
ECHO LVOT DIAM: 1.9 CM
ECHO LVOT MEAN GRADIENT: 2 MMHG
ECHO LVOT PEAK GRADIENT: 3 MMHG
ECHO LVOT PEAK VELOCITY: 0.9 M/S
ECHO LVOT STROKE VOLUME INDEX: 35.6 ML/M2
ECHO LVOT SV: 64 ML
ECHO LVOT VTI: 22.6 CM
ECHO MV A VELOCITY: 0.97 M/S
ECHO MV AREA VTI: 2 CM2
ECHO MV E DECELERATION TIME (DT): 249.8 MS
ECHO MV E VELOCITY: 0.87 M/S
ECHO MV E/A RATIO: 0.9
ECHO MV E/E' LATERAL: 8.4
ECHO MV E/E' RATIO (AVERAGED): 16.12
ECHO MV E/E' SEPTAL: 23.84
ECHO MV LVOT VTI INDEX: 1.44
ECHO MV MAX VELOCITY: 1.1 M/S
ECHO MV MEAN GRADIENT: 2 MMHG
ECHO MV MEAN VELOCITY: 0.6 M/S
ECHO MV PEAK GRADIENT: 5 MMHG
ECHO MV VTI: 32.6 CM
ECHO PULMONARY ARTERY END DIASTOLIC PRESSURE: 4 MMHG
ECHO PV MAX VELOCITY: 0.9 M/S
ECHO PV PEAK GRADIENT: 3 MMHG
ECHO PV REGURGITANT MAX VELOCITY: 1 M/S
ECHO RA AREA 4C: 6.6 CM2
ECHO RA END SYSTOLIC VOLUME APICAL 4 CHAMBER INDEX BSA: 7 ML/M2
ECHO RA VOLUME AREA LENGTH APICAL 4 CHAMBER: 13 ML
ECHO RA VOLUME: 12 ML
ECHO RIGHT VENTRICULAR SYSTOLIC PRESSURE (RVSP): 52 MMHG
ECHO RV BASAL DIMENSION: 4.1 CM
ECHO RV FREE WALL PEAK S': 13.1 CM/S
ECHO RV TAPSE: 2 CM (ref 1.7–?)
ECHO RVOT PEAK GRADIENT: 2 MMHG
ECHO RVOT PEAK VELOCITY: 0.7 M/S
ECHO TV REGURGITANT MAX VELOCITY: 3.51 M/S
ECHO TV REGURGITANT PEAK GRADIENT: 49 MMHG

## 2025-07-10 PROCEDURE — 93306 TTE W/DOPPLER COMPLETE: CPT

## 2025-07-10 PROCEDURE — 93306 TTE W/DOPPLER COMPLETE: CPT | Performed by: INTERNAL MEDICINE

## 2025-07-11 ENCOUNTER — PATIENT MESSAGE (OUTPATIENT)
Facility: CLINIC | Age: 70
End: 2025-07-11

## 2025-07-11 DIAGNOSIS — I27.20 PULMONARY HYPERTENSION (HCC): Primary | ICD-10-CM

## 2025-07-15 NOTE — TELEPHONE ENCOUNTER
Referred dr jolly     Diagnosis Orders   1. Pulmonary hypertension (HCC)  External Referral To Pulmonology

## 2025-07-15 NOTE — TELEPHONE ENCOUNTER
Referral and echo results faxed to [phone]; confirmation received.    [phone] number on the referral is not the fax number but the phone number for the clinic.